# Patient Record
Sex: MALE | Race: WHITE | Employment: OTHER | URBAN - METROPOLITAN AREA
[De-identification: names, ages, dates, MRNs, and addresses within clinical notes are randomized per-mention and may not be internally consistent; named-entity substitution may affect disease eponyms.]

---

## 2021-06-25 ENCOUNTER — APPOINTMENT (OUTPATIENT)
Dept: GENERAL RADIOLOGY | Facility: HOSPITAL | Age: 71
End: 2021-06-25
Attending: EMERGENCY MEDICINE
Payer: MEDICARE

## 2021-06-25 ENCOUNTER — HOSPITAL ENCOUNTER (EMERGENCY)
Facility: HOSPITAL | Age: 71
Discharge: HOME | End: 2021-06-25
Attending: EMERGENCY MEDICINE
Payer: MEDICARE

## 2021-06-25 VITALS
DIASTOLIC BLOOD PRESSURE: 69 MMHG | HEART RATE: 51 BPM | RESPIRATION RATE: 16 BRPM | WEIGHT: 180 LBS | TEMPERATURE: 98 F | HEIGHT: 73 IN | OXYGEN SATURATION: 99 % | SYSTOLIC BLOOD PRESSURE: 157 MMHG | BODY MASS INDEX: 23.86 KG/M2

## 2021-06-25 DIAGNOSIS — L97.411 DIABETIC ULCER OF RIGHT MIDFOOT ASSOCIATED WITH TYPE 2 DIABETES MELLITUS, LIMITED TO BREAKDOWN OF SKIN (HCC): Primary | ICD-10-CM

## 2021-06-25 DIAGNOSIS — L03.115 CELLULITIS OF RIGHT LOWER EXTREMITY: ICD-10-CM

## 2021-06-25 DIAGNOSIS — E11.621 DIABETIC ULCER OF RIGHT MIDFOOT ASSOCIATED WITH TYPE 2 DIABETES MELLITUS, LIMITED TO BREAKDOWN OF SKIN (HCC): Primary | ICD-10-CM

## 2021-06-25 PROCEDURE — 73630 X-RAY EXAM OF FOOT: CPT | Performed by: EMERGENCY MEDICINE

## 2021-06-25 PROCEDURE — 80053 COMPREHEN METABOLIC PANEL: CPT | Performed by: EMERGENCY MEDICINE

## 2021-06-25 PROCEDURE — 99284 EMERGENCY DEPT VISIT MOD MDM: CPT

## 2021-06-25 PROCEDURE — 85025 COMPLETE CBC W/AUTO DIFF WBC: CPT | Performed by: EMERGENCY MEDICINE

## 2021-06-25 PROCEDURE — 96365 THER/PROPH/DIAG IV INF INIT: CPT

## 2021-06-25 PROCEDURE — S0077 INJECTION, CLINDAMYCIN PHOSP: HCPCS | Performed by: EMERGENCY MEDICINE

## 2021-06-25 PROCEDURE — 96366 THER/PROPH/DIAG IV INF ADDON: CPT

## 2021-06-25 RX ORDER — METOPROLOL SUCCINATE 50 MG/1
50 TABLET, EXTENDED RELEASE ORAL 2 TIMES DAILY
COMMUNITY
End: 2021-10-06

## 2021-06-25 RX ORDER — CLOPIDOGREL BISULFATE 75 MG/1
75 TABLET ORAL DAILY
COMMUNITY
End: 2021-10-27

## 2021-06-25 RX ORDER — INSULIN DEGLUDEC INJECTION 100 U/ML
20 INJECTION, SOLUTION SUBCUTANEOUS DAILY
COMMUNITY
End: 2021-10-06

## 2021-06-25 RX ORDER — ACETAMINOPHEN 500 MG
TABLET ORAL
Status: COMPLETED
Start: 2021-06-25 | End: 2021-06-25

## 2021-06-25 RX ORDER — LISINOPRIL 20 MG/1
20 TABLET ORAL DAILY
COMMUNITY
End: 2021-10-27

## 2021-06-25 RX ORDER — SERTRALINE HYDROCHLORIDE 100 MG/1
100 TABLET, FILM COATED ORAL DAILY
COMMUNITY
End: 2021-10-27

## 2021-06-25 RX ORDER — CLINDAMYCIN HYDROCHLORIDE 300 MG/1
300 CAPSULE ORAL 3 TIMES DAILY
Qty: 30 CAPSULE | Refills: 0 | Status: SHIPPED | OUTPATIENT
Start: 2021-06-25 | End: 2021-07-05

## 2021-06-25 RX ORDER — CLINDAMYCIN PHOSPHATE 600 MG/50ML
600 INJECTION INTRAVENOUS ONCE
Status: COMPLETED | OUTPATIENT
Start: 2021-06-25 | End: 2021-06-25

## 2021-06-25 RX ORDER — HYDROCHLOROTHIAZIDE 25 MG/1
25 TABLET ORAL DAILY
COMMUNITY
End: 2021-10-06

## 2021-06-25 RX ORDER — SODIUM CHLORIDE 9 MG/ML
1000 INJECTION, SOLUTION INTRAVENOUS ONCE
Status: COMPLETED | OUTPATIENT
Start: 2021-06-25 | End: 2021-06-25

## 2021-06-25 RX ORDER — ACETAMINOPHEN 500 MG
1000 TABLET ORAL ONCE
Status: COMPLETED | OUTPATIENT
Start: 2021-06-25 | End: 2021-06-25

## 2021-06-25 RX ORDER — GABAPENTIN 300 MG/1
300 CAPSULE ORAL NIGHTLY
COMMUNITY
End: 2021-10-27

## 2021-06-25 RX ORDER — ROSUVASTATIN CALCIUM 40 MG/1
40 TABLET, COATED ORAL NIGHTLY
COMMUNITY
End: 2021-10-27

## 2021-06-25 RX ORDER — ASPIRIN 81 MG/1
81 TABLET, CHEWABLE ORAL DAILY
COMMUNITY
End: 2021-12-28

## 2021-06-26 NOTE — ED PROVIDER NOTES
Patient Seen in: [facility] Emergency Department      History   Patient presents with:  Swelling Edema    Stated Complaint: pt c/o swollen feet and ulcers on the side x1 week     HPI/Subjective:   HPI    The patient is a 52-year-old diabetic male pr Exam     ED Triage Vitals [06/25/21 1920]   BP (!) 162/69   Pulse 55   Resp 18   Temp 98 °F (36.7 °C)   Temp src Oral   SpO2 98 %   O2 Device None (Room air)       Current:/69   Pulse 51   Temp 98 °F (36.7 °C) (Oral)   Resp 16   Ht 185.4 cm (6' 1\") tenderness. Distal sensations intact. DP pulses are faintly palpable.   Psych: Normal interaction, cooperative with exam       ED Course     Labs Reviewed   COMP METABOLIC PANEL (14) - Abnormal; Notable for the following components:       Result Value FINDINGS:  There is scattered mild arthritis, most pronounced in the     midfoot. There is pes planus. There is a small plantar heel spur. No     acute fracture, expansile lesion or erosion.   No gas or radiopaque foreign     body is seen in the soft over the dorsum of the foot. Correlation for cellulitis recommended.                      Dictated by (CST): Carrie Merchant MD on 6/25/2021 at 8:58 PM         Finalized by (CST): Carrie Merchant MD on 6/25/2021 at 9:01 PM              Patient's labs are reass

## 2021-09-28 ENCOUNTER — HOSPITAL ENCOUNTER (INPATIENT)
Facility: HOSPITAL | Age: 71
LOS: 8 days | Discharge: HOME | DRG: 253 | End: 2021-10-06
Attending: EMERGENCY MEDICINE | Admitting: HOSPITALIST
Payer: MEDICARE

## 2021-09-28 ENCOUNTER — APPOINTMENT (OUTPATIENT)
Dept: ULTRASOUND IMAGING | Facility: HOSPITAL | Age: 71
DRG: 253 | End: 2021-09-28
Attending: INTERNAL MEDICINE
Payer: MEDICARE

## 2021-09-28 ENCOUNTER — APPOINTMENT (OUTPATIENT)
Dept: MRI IMAGING | Facility: HOSPITAL | Age: 71
DRG: 253 | End: 2021-09-28
Attending: STUDENT IN AN ORGANIZED HEALTH CARE EDUCATION/TRAINING PROGRAM
Payer: MEDICARE

## 2021-09-28 ENCOUNTER — APPOINTMENT (OUTPATIENT)
Dept: GENERAL RADIOLOGY | Facility: HOSPITAL | Age: 71
DRG: 253 | End: 2021-09-28
Attending: EMERGENCY MEDICINE
Payer: MEDICARE

## 2021-09-28 DIAGNOSIS — I73.9 PAD (PERIPHERAL ARTERY DISEASE) (HCC): ICD-10-CM

## 2021-09-28 DIAGNOSIS — R73.9 HYPERGLYCEMIA: ICD-10-CM

## 2021-09-28 DIAGNOSIS — E11.65 TYPE 2 DIABETES MELLITUS WITH HYPERGLYCEMIA, WITH LONG-TERM CURRENT USE OF INSULIN (HCC): ICD-10-CM

## 2021-09-28 DIAGNOSIS — L03.116 CELLULITIS OF LEFT LOWER EXTREMITY: ICD-10-CM

## 2021-09-28 DIAGNOSIS — Z79.4 TYPE 2 DIABETES MELLITUS WITH HYPERGLYCEMIA, WITH LONG-TERM CURRENT USE OF INSULIN (HCC): ICD-10-CM

## 2021-09-28 DIAGNOSIS — L97.528 DIABETIC ULCER OF TOE OF LEFT FOOT ASSOCIATED WITH DIABETES MELLITUS OF OTHER TYPE, WITH OTHER ULCER SEVERITY (HCC): Primary | ICD-10-CM

## 2021-09-28 DIAGNOSIS — E13.621 DIABETIC ULCER OF TOE OF LEFT FOOT ASSOCIATED WITH DIABETES MELLITUS OF OTHER TYPE, WITH OTHER ULCER SEVERITY (HCC): Primary | ICD-10-CM

## 2021-09-28 DIAGNOSIS — M86.9 OSTEOMYELITIS OF SECOND TOE OF LEFT FOOT (HCC): ICD-10-CM

## 2021-09-28 PROBLEM — E11.621 DIABETIC FOOT ULCER (HCC): Status: ACTIVE | Noted: 2021-09-28

## 2021-09-28 PROBLEM — L97.509 DIABETIC FOOT ULCER (HCC): Status: ACTIVE | Noted: 2021-09-28

## 2021-09-28 PROCEDURE — 73630 X-RAY EXAM OF FOOT: CPT | Performed by: EMERGENCY MEDICINE

## 2021-09-28 PROCEDURE — 3046F HEMOGLOBIN A1C LEVEL >9.0%: CPT | Performed by: NURSE PRACTITIONER

## 2021-09-28 PROCEDURE — 99223 1ST HOSP IP/OBS HIGH 75: CPT | Performed by: INTERNAL MEDICINE

## 2021-09-28 PROCEDURE — 99223 1ST HOSP IP/OBS HIGH 75: CPT | Performed by: STUDENT IN AN ORGANIZED HEALTH CARE EDUCATION/TRAINING PROGRAM

## 2021-09-28 PROCEDURE — 73718 MRI LOWER EXTREMITY W/O DYE: CPT | Performed by: STUDENT IN AN ORGANIZED HEALTH CARE EDUCATION/TRAINING PROGRAM

## 2021-09-28 PROCEDURE — 93971 EXTREMITY STUDY: CPT | Performed by: INTERNAL MEDICINE

## 2021-09-28 RX ORDER — SODIUM CHLORIDE 9 MG/ML
INJECTION, SOLUTION INTRAVENOUS CONTINUOUS
Status: ACTIVE | OUTPATIENT
Start: 2021-09-28 | End: 2021-09-28

## 2021-09-28 RX ORDER — INSULIN ASPART 100 [IU]/ML
0.2 INJECTION, SOLUTION INTRAVENOUS; SUBCUTANEOUS ONCE
Status: COMPLETED | OUTPATIENT
Start: 2021-09-28 | End: 2021-09-28

## 2021-09-28 RX ORDER — ASPIRIN 81 MG/1
81 TABLET, CHEWABLE ORAL DAILY
Status: DISCONTINUED | OUTPATIENT
Start: 2021-09-28 | End: 2021-10-06

## 2021-09-28 RX ORDER — METOCLOPRAMIDE HYDROCHLORIDE 5 MG/ML
5 INJECTION INTRAMUSCULAR; INTRAVENOUS EVERY 8 HOURS PRN
Status: DISCONTINUED | OUTPATIENT
Start: 2021-09-28 | End: 2021-10-06

## 2021-09-28 RX ORDER — LISINOPRIL 20 MG/1
20 TABLET ORAL 2 TIMES DAILY
Status: DISCONTINUED | OUTPATIENT
Start: 2021-09-28 | End: 2021-10-06

## 2021-09-28 RX ORDER — POTASSIUM CHLORIDE 20 MEQ/1
40 TABLET, EXTENDED RELEASE ORAL EVERY 4 HOURS
Status: COMPLETED | OUTPATIENT
Start: 2021-09-28 | End: 2021-09-28

## 2021-09-28 RX ORDER — ROSUVASTATIN CALCIUM 20 MG/1
40 TABLET, COATED ORAL NIGHTLY
Status: DISCONTINUED | OUTPATIENT
Start: 2021-09-28 | End: 2021-10-06

## 2021-09-28 RX ORDER — SODIUM CHLORIDE, SODIUM LACTATE, POTASSIUM CHLORIDE, CALCIUM CHLORIDE 600; 310; 30; 20 MG/100ML; MG/100ML; MG/100ML; MG/100ML
INJECTION, SOLUTION INTRAVENOUS CONTINUOUS
Status: DISCONTINUED | OUTPATIENT
Start: 2021-09-28 | End: 2021-10-06

## 2021-09-28 RX ORDER — HEPARIN SODIUM 5000 [USP'U]/ML
5000 INJECTION, SOLUTION INTRAVENOUS; SUBCUTANEOUS EVERY 8 HOURS SCHEDULED
Status: DISCONTINUED | OUTPATIENT
Start: 2021-09-28 | End: 2021-10-06

## 2021-09-28 RX ORDER — ACETAMINOPHEN 325 MG/1
650 TABLET ORAL EVERY 6 HOURS PRN
Status: DISCONTINUED | OUTPATIENT
Start: 2021-09-28 | End: 2021-10-06

## 2021-09-28 RX ORDER — ONDANSETRON 2 MG/ML
4 INJECTION INTRAMUSCULAR; INTRAVENOUS EVERY 6 HOURS PRN
Status: DISCONTINUED | OUTPATIENT
Start: 2021-09-28 | End: 2021-10-06

## 2021-09-28 RX ORDER — EZETIMIBE 10 MG/1
10 TABLET ORAL NIGHTLY
Status: ON HOLD | COMMUNITY
End: 2021-09-28

## 2021-09-28 RX ORDER — SERTRALINE HYDROCHLORIDE 100 MG/1
100 TABLET, FILM COATED ORAL DAILY
Status: DISCONTINUED | OUTPATIENT
Start: 2021-09-28 | End: 2021-10-06

## 2021-09-28 RX ORDER — DEXTROSE MONOHYDRATE 25 G/50ML
50 INJECTION, SOLUTION INTRAVENOUS
Status: DISCONTINUED | OUTPATIENT
Start: 2021-09-28 | End: 2021-10-06

## 2021-09-28 RX ORDER — METOPROLOL SUCCINATE 50 MG/1
50 TABLET, EXTENDED RELEASE ORAL
Status: DISCONTINUED | OUTPATIENT
Start: 2021-09-28 | End: 2021-10-06

## 2021-09-28 RX ORDER — GABAPENTIN 300 MG/1
300 CAPSULE ORAL 3 TIMES DAILY
Status: DISCONTINUED | OUTPATIENT
Start: 2021-09-28 | End: 2021-10-06

## 2021-09-28 NOTE — PLAN OF CARE
Pt A&ox4. Sats >90% on RA. NSR on tele. Potassium replaced waiting for redraw. SCD's. subcutaneous heparin. Pt voids per the bathroom. Pt is able to ambulate 1x SB. 1800 ADA with a QID auccheck, sugars being controlled with insulin.  IV ABX and LR @100 ml/h

## 2021-09-28 NOTE — H&P
[facility] HOSPITALIST  History and Physical     Maricruz Scarlett Patient Status:  Emergency    1950 MRN [id number]   Location [address] [facility] Attending Michelle Massey, [address] [facility] Day # 0 PCP Sherie Habermann, MD     Chief Com Take 300 mg by mouth 3 (three) times daily. , Disp: , Rfl:   hydrochlorothiazide 25 MG Oral Tab, Take 25 mg by mouth daily. , Disp: , Rfl:   metFORMIN HCl 500 MG Oral Tab, Take 500 mg by mouth 2 (two) times daily with meals. , Disp: , Rfl:   Insulin Degludec Not Detected 09/28/2021       Pro-Calcitonin  No results for input(s): PCT in the last 168 hours. Cardiac  No results for input(s): TROP, PBNP in the last 168 hours. Creatinine Kinase  No results for input(s): CK in the last 168 hours.     Inflammator

## 2021-09-28 NOTE — CONSULTS
[facility]    Report of Consultation    Curly Allen Patient Status:  Inpatient    1950 MRN [id number]   [facility] 3SW-A Attending Kiki Mccoy, DO   Hosp Day # 0 PCP Javier Mueller MD     Date of Admission 8 tablet, 8 tablet, Oral, Q15 Min PRN  •  lactated ringers infusion, , Intravenous, Continuous  •  Heparin Sodium (Porcine) 5000 UNIT/ML injection 5,000 Units, 5,000 Units, Subcutaneous, Q8H [facility]  •  acetaminophen (TYLENOL) tab 650 mg, 650 mg, Oral, Q6H PRN (TEK=07342)    Result Date: 9/28/2021  CONCLUSION:  Eroded appearance of the majority of the distal phalanx of the 2nd toe. Soft tissues are swollen. Given history of ulcer as well as the bone erosion, concerning for osteomyelitis.   If any further imagin vascular consult pending results of arterial duplex.  -Wound cultures pending.  -Receiving Zosyn.  -Post op shoes ordered for both feet.  He is okay to heel weight bear to left foot with surgical shoe at this time.  -Will continue to follow.  -Please reach

## 2021-09-28 NOTE — ED QUICK NOTES
Spoke w/.  Ricarda [phone] , pt daughter given update, pt family allowed to know medical information

## 2021-09-28 NOTE — PROGRESS NOTES
09/28/21 1402   Clinical Encounter Type   Visited With Patient   Continue Visiting No   Patient Spiritual Encounters   Spiritual Interventions  completed a HCPOA with patient, making copies for him and , and putting a copy in his pa

## 2021-09-28 NOTE — PROGRESS NOTES
NURSING ADMISSION NOTE      Patient admitted via Cart  Oriented to room. Safety precautions initiated. Bed in low position. Call light in reach. Patient admitted in stable condition. Admission navigator completed with patient.  Patient unsure if h

## 2021-09-28 NOTE — DIETARY NOTE
Annetta     Admitting diagnosis:  Hyperglycemia [R73.9]  Cellulitis of left lower extremity [J33.517]  Diabetic ulcer of toe of left foot associated with diabetes mellitus of other type, with other ulcer

## 2021-09-28 NOTE — ED PROVIDER NOTES
Patient Seen in: [facility] Emergency Department      History   Patient presents with:  Cellulitis    Stated Complaint: foot ulcer x one week    Subjective:   HPI    78-year-old male with history of diabetes mellitus, hypertension, coronary disease, peripheral edema, no calf tenderness, dorsal pedal pulses present and equal bilaterally. Left foot–there is ulcer to the distal aspect of the second toe with diffuse swelling and erythema the digit, erythema and warmth extends to the dorsum of the foot. foot 4 days  Other Notes (entered by Technologist):      Additional Information (per Vision Radiologist):      XR left foot      IMPRESSION:  -The mid and distal portions of the second distal phalanx are poorly visualized and could be eroded secondary to ch

## 2021-09-29 PROCEDURE — 99232 SBSQ HOSP IP/OBS MODERATE 35: CPT | Performed by: HOSPITALIST

## 2021-09-29 PROCEDURE — 99232 SBSQ HOSP IP/OBS MODERATE 35: CPT | Performed by: STUDENT IN AN ORGANIZED HEALTH CARE EDUCATION/TRAINING PROGRAM

## 2021-09-29 RX ORDER — VANCOMYCIN HYDROCHLORIDE
15 EVERY 24 HOURS
Status: DISCONTINUED | OUTPATIENT
Start: 2021-09-30 | End: 2021-09-30

## 2021-09-29 RX ORDER — VANCOMYCIN 2 GRAM/500 ML IN 0.9 % SODIUM CHLORIDE INTRAVENOUS
25 ONCE
Status: COMPLETED | OUTPATIENT
Start: 2021-09-29 | End: 2021-09-29

## 2021-09-29 NOTE — PROGRESS NOTES
[facility] Dosing Service    Initial Pharmacokinetic Consult for Vancomycin Dosing     Sunday Carlos is a 80year old patient who is being treated for diabetic foot and osteomyelitis.      Weights:  Ideal body weight: 75.3 kg (166 lb 0.1 oz)  Adjus

## 2021-09-29 NOTE — PROGRESS NOTES
[facility]     Hospitalist Progress Note     Sue Ortega Patient Status:  Inpatient    1950 MRN [id number]   [facility] 3SW-A Attending [facility] Day # 1 PCP Natan Bryan MD     Chief Compl in the last 168 hours. Imaging: Imaging data reviewed in Epic.     Medications:   • vancomycin  25 mg/kg Intravenous Once   • [START ON 9/30/2021] vancomycin  15 mg/kg Intravenous Q24H   • Heparin Sodium (Porcine)  5,000 Units Subcutaneous Q8H [address]   • In home        **Certification      PHYSICIAN Certification of Need for Inpatient Hospitalization - Initial Certification    Patient will require inpatient services that will reasonably be expected to span two midnight's based on the clinical documentation in

## 2021-09-29 NOTE — PLAN OF CARE
Patient A&O X4 on RA. VSS, /telemetry- NSR. SCDs, SubQ heparin. Voiding freely, LBM 9/28. Wound to left foot second toe with dressing in place, c/d/i. Podiatry on consult. Denies pain. On IV antibioitics. Post-op shoes ordered. Min assist with walker.  R

## 2021-09-29 NOTE — PLAN OF CARE
Dressing to left foot clean, dry, intact. Denies pain. Iv antibiotics as ordered. Awaiting arterial doppler. Instructed to call for all asst needed, discomfort felt, call don't fall protocol. Verbalized understanding. Safety precautions maintained.

## 2021-09-29 NOTE — CONSULTS
[facility] CONSULT NOTE    Naoma Precise Patient Status:  Inpatient    1950 MRN [id number]   [facility] 3SW-A Attending [place] [id number] Day # 1 PCP Fawn Rodriguez MD       Reason for Cons Intravenous, Q6H PRN  •  metoclopramide (REGLAN) injection 5 mg, 5 mg, Intravenous, Q8H PRN  •  Insulin Aspart Pen (NOVOLOG) 100 UNIT/ML flexpen 2-10 Units, 2-10 Units, Subcutaneous, TID CC and HS  •  Insulin Aspart Pen (NOVOLOG) 100 UNIT/ML flexpen 1-68 U Movement of all extremities  Integument: Dressing over left foot- clean/dry/intact. Picture from Dr. Jace Paez note reviewed.      Laboratory Data:  Recent Labs   Lab 09/28/21  0515   RBC 3.53*   HGB 10.3*   HCT 31.3*   MCV 88.7   MCH 29.2   MCHC 32.9   RDW 1 Basia Leo MD on 9/28/2021 at [address] by (CST): Basia Leo MD on 9/28/2021 at 6:20 PM       PROCEDURE: XR FOOT, COMPLETE (MIN 3 VIEWS), LEFT (CPT=73630)     TECHNIQUE: AP, oblique, and lateral views were obtained.      COMPARISON: [facility] PATIENT STATED HISTORY: (As transcribed by Technologist) left lower extremity edema     FINDINGS: Compression is demonstrated of the common femoral, superficial femoral and popliteal venous segments. There is observed phasicity and augmentation.  Flow i

## 2021-09-30 ENCOUNTER — APPOINTMENT (OUTPATIENT)
Dept: ULTRASOUND IMAGING | Facility: HOSPITAL | Age: 71
DRG: 253 | End: 2021-09-30
Attending: STUDENT IN AN ORGANIZED HEALTH CARE EDUCATION/TRAINING PROGRAM
Payer: MEDICARE

## 2021-09-30 PROCEDURE — 99232 SBSQ HOSP IP/OBS MODERATE 35: CPT | Performed by: HOSPITALIST

## 2021-09-30 PROCEDURE — 99231 SBSQ HOSP IP/OBS SF/LOW 25: CPT | Performed by: STUDENT IN AN ORGANIZED HEALTH CARE EDUCATION/TRAINING PROGRAM

## 2021-09-30 PROCEDURE — 93925 LOWER EXTREMITY STUDY: CPT | Performed by: STUDENT IN AN ORGANIZED HEALTH CARE EDUCATION/TRAINING PROGRAM

## 2021-09-30 RX ORDER — CEFAZOLIN SODIUM/WATER 2 G/20 ML
2 SYRINGE (ML) INTRAVENOUS EVERY 8 HOURS
Status: DISCONTINUED | OUTPATIENT
Start: 2021-09-30 | End: 2021-10-06

## 2021-09-30 NOTE — PROGRESS NOTES
[facility]    Progress Note    Christiano Sutton Patient Status:  Inpatient    1950 MRN [id number]   [facility] 3SW-A Attending Reanna Cross, [address] [facility] Day # 1 PCP Alicia Spicer MD     Date of Admission:   (REGLAN) injection 5 mg, 5 mg, Intravenous, Q8H PRN  •  Insulin Aspart Pen (NOVOLOG) 100 UNIT/ML flexpen 2-10 Units, 2-10 Units, Subcutaneous, TID CC and HS  •  Insulin Aspart Pen (NOVOLOG) 100 UNIT/ML flexpen 1-68 Units, 1-68 Units, Subcutaneous, TID CC 9/28/2021  CONCLUSION:  There is osteomyelitis involving distal and middle phalanges of the 2nd toe.   The marrow signal in the proximal phalange is normal.  Marrow signal in remaining bony structures is normal.   Dictated by (CST): Manuela Richards MD on 9/28 Rajiv Leggett DPM

## 2021-09-30 NOTE — PLAN OF CARE
Patient A&O X4 on RA. VSS, /telemetry- NSR. SubQ heparin. Voiding freely, LBM 9/29. Wound to left foot second toe with dressing in place, c/d/i. Heel WB with post-op shoes. Denies pain. On IV antibioitics. Min assist with walker.  Reminded to use call li

## 2021-09-30 NOTE — PROGRESS NOTES
Lunch blood sugar 357. Order is to call if >350. 10 units given per sliding scale, plus 7 units for carb count as well. Page sent to Dr Marycarmen Stout. Spoke to Dr Marycarmen Stout. Per Dr Marycarmen Stout, will continue to monitor sugars.  Also monitor for extra snac

## 2021-09-30 NOTE — PROGRESS NOTES
[facility]     Hospitalist Progress Note     Kiran Roberts Patient Status:  Inpatient    1950 MRN [id number]   [facility] 3SW-A Attending [address]  [place] Day # 2 PCP Cecilia Cutler MD     Chief Compl Markers  No results for input(s): CRP, TANISHA, LDH, DDIMER in the last 168 hours. Imaging: Imaging data reviewed in Epic.     Medications:   • ceFAZolin  2 g Intravenous Q8H   • Heparin Sodium (Porcine)  5,000 Units Subcutaneous Q8H [facility]   • Insulin Aspart P this point Mr. Aster Nieto is expected to be discharge to: home

## 2021-09-30 NOTE — PLAN OF CARE
Pt a&O. On room air. Pt refusing Scds to BLE. Tolerating diet with excellent appetite. Blood sugars monitored and insulin given as ordered. Last BM 9/29/21. Voiding w/o difficulty. No c/o pain. Up independently using walker and gait belt.  Post op shoes on

## 2021-09-30 NOTE — PROGRESS NOTES
[facility]    Progress Note    Juan Mike Patient Status:  Inpatient    1950 MRN [id number]   [facility] 3SW-A Attending Ester Morales, [address] [facility] Day # 2 PCP Lemuel Walker MD     Date of Admission: flexpen 2-10 Units, 2-10 Units, Subcutaneous, TID CC and HS  •  Insulin Aspart Pen (NOVOLOG) 100 UNIT/ML flexpen 1-68 Units, 1-68 Units, Subcutaneous, TID CC  •  aspirin chewable tab 81 mg, 81 mg, Oral, Daily  •  gabapentin (NEURONTIN) cap 300 mg, 300 mg, (KWW=[id number])    Result Date: 9/30/2021  CONCLUSION:  1. Stenosis involves the posterior tibial arteries, left greater than right as well as the anterior tibial arteries with occlusion of the right distal anterior tibial artery. Please see details as above.

## 2021-09-30 NOTE — PROGRESS NOTES
[facility] ID PROGRESS NOTE    Kristal Shape Patient Status:  Inpatient    1950 MRN [id number]   [facility] 3SW-A Attending [facility] Day # 2 PCP Alisha Muro MD     Abx: IV rishio D# Systems:  Completed. See pertinent positives and negatives above. Physical Exam:  Vital signs: Blood pressure 152/47, pulse 52, temperature 98.6 °F (37 °C), temperature source Oral, resp.  rate 16, height 5' 11\" (1.803 m), weight 220 lb (99.8 kg), SpO2 needed. Exam and Impression/ Recs as noted above.   D/w staff and with pt  Will follow     Carlos Ho MD

## 2021-10-01 PROCEDURE — 99232 SBSQ HOSP IP/OBS MODERATE 35: CPT | Performed by: HOSPITALIST

## 2021-10-01 PROCEDURE — 99231 SBSQ HOSP IP/OBS SF/LOW 25: CPT | Performed by: STUDENT IN AN ORGANIZED HEALTH CARE EDUCATION/TRAINING PROGRAM

## 2021-10-01 NOTE — PROGRESS NOTES
Spoke to Dr Jacqueline Monroe from Vascular surgery. Per Dr Jacqueline Monroe, he will be taking over care of this pt and will be seeing him later. If not today, then he is here this weekend as well. If angiogram indicated, it will be scheduled on Monday per Dr Jacqueline Monroe.  Pt

## 2021-10-01 NOTE — PLAN OF CARE
Patient A&O X4 on RA. VSS, . SubQ heparin. Voiding freely, LBM 9/30. Wound to left foot second toe with dressing in place, c/d/i. Heel WB with post-op shoes. Denies pain. On IV Ancef. Min assist with walker.  BLE arterial dopplers completed this morning-

## 2021-10-01 NOTE — PROGRESS NOTES
[facility] ID PROGRESS NOTE    Ramón Phan Patient Status:  Inpatient    1950 MRN [id number]   [facility] 3SW-A Attending Ilda [facility] Day # 3 PCP Aravind Lugo MD     Abx: IV Ancef D# Subcutaneous, Daily    Review of Systems:  Completed. See pertinent positives and negatives above. Physical Exam:  Vital signs: Blood pressure 118/57, pulse 53, temperature 97.7 °F (36.5 °C), temperature source Oral, resp.  rate 19, height 5' 11\" (1.803 examined independently. Chart reviewed. Agree with above. Note has been reviewed by me and modified as needed. Exam and Impression/ Recs as noted above.   D/w staff and with pt  Will follow     Damaso Dotson MD

## 2021-10-01 NOTE — PLAN OF CARE
Pt a&O. On room air. Pt refusing Scds to BLE. Tolerating diet with excellent appetite. Blood sugars monitored and insulin given as ordered. Had several BMs today. Voiding w/o difficulty. No c/o pain. Up independently using walker and gait belt.  Post op caitie

## 2021-10-01 NOTE — PROGRESS NOTES
[facility]    Progress Note    Chinedu Vang Patient Status:  Inpatient    1950 MRN [id number]   [facility] 3SW-A Attending Eliazar Bo, [address] [facility] Day # 3 PCP Micaela Hamman, MD     Date of Admission: Units, Subcutaneous, TID CC and HS  •  Insulin Aspart Pen (NOVOLOG) 100 UNIT/ML flexpen 1-68 Units, 1-68 Units, Subcutaneous, TID CC  •  aspirin chewable tab 81 mg, 81 mg, Oral, Daily  •  gabapentin (NEURONTIN) cap 300 mg, 300 mg, Oral, TID  •  lisinopril Please see details as above. 2. Small vessel disease involves the feet bilaterally.    Dictated by (CST): Norma Allen MD on 9/30/2021 at 11:48 AM     Finalized by (MARTY): Norma Allen MD on 9/30/2021 at 11:57 AM          Laboratory Data:  Recent Labs   Lab 09/2

## 2021-10-01 NOTE — PROGRESS NOTES
[facility]     Hospitalist Progress Note     Rocíobeckie Soares Patient Status:  Inpatient    1950 MRN [id number]   [facility] 3SW-A Attending Marlene Gloria [facility] Day # 3 PCP Rosaura Pena MD     Chief Compl last 168 hours. Cardiac  No results for input(s): TROP, PBNP in the last 168 hours. Creatinine Kinase  No results for input(s): CK in the last 168 hours. Inflammatory Markers  No results for input(s): CRP, TANSIHA, LDH, DDIMER in the last 168 hours. Quality:  · DVT Prophylaxis: heparin  · CODE status: Full  · Gonsales: N/A  · Central line: N/A  · If COVID testing is negative, may discontinue isolation: yes     Will the patient be referred to TCC on discharge?: no  Estimated date of discharge: haydee Palmer

## 2021-10-02 PROCEDURE — 99232 SBSQ HOSP IP/OBS MODERATE 35: CPT | Performed by: HOSPITALIST

## 2021-10-02 NOTE — PROGRESS NOTES
[facility] PROGRESS NOTE    Fern Killer Patient Status:  Inpatient    1950 MRN [id number]   [facility] 3SW-A Attending [place]  [place] Day # 4 PCP Paulette Cesar MD     Abx: IV Ancef D# Systems:  Completed. See pertinent positives and negatives above. Physical Exam:  Vital signs: Blood pressure 113/49, pulse 58, temperature 98.2 °F (36.8 °C), temperature source Oral, resp.  rate 16, height 5' 11\" (1.803 m), weight 220 lb (99.8 kg), SpO vascular following, plans for angiogram, likely Monday    Discussed case with RN and patient. Discussed with patient's daughter over the phone. All questions answered.     Carmen Garcia PA-C

## 2021-10-02 NOTE — PROGRESS NOTES
[facility]    Progress Note    Kiran Roberts Patient Status:  Inpatient    1950 MRN [id number]   [facility] 3SW-A Attending Osei Burch, 160[address] [facility] Day # 4 PCP Cecilia Cutler MD     Date of Admission: metoclopramide (REGLAN) injection 5 mg, 5 mg, Intravenous, Q8H PRN  •  Insulin Aspart Pen (NOVOLOG) 100 UNIT/ML flexpen 2-10 Units, 2-10 Units, Subcutaneous, TID CC and HS  •  Insulin Aspart Pen (NOVOLOG) 100 UNIT/ML flexpen 1-68 Units, 1-68 Units, Subcut confirm osteomyelitis to distal and middle phalanges of left 2nd digit. -Reviewed x-ray findings with patient--suggestive of osteomyelitis to left 2nd digit.   -Reviewed arterial duplex results--stenosis present to left PTA.   -Vascular surgery planning fo

## 2021-10-02 NOTE — PLAN OF CARE
Dressing to left lower extremity clean, dry, intact. Wearing bilateral post op shoes. Daughter at bedside. Instructed on plan of care, call don't fall protocol, call for all asst needed, discomfort felt. Verbalized understanding.   Plan possible angiogr

## 2021-10-02 NOTE — PLAN OF CARE
A/O VSS on room air. Left foot dressing with gauze and kerlix c/d/I. Post op shoe on when up. Heel WB to left foot. Denies pain. Up with walker and SB assist. Vascular consult to see. Plan for angiogram on Monday. Plan of care reviewed.  Call light within r

## 2021-10-02 NOTE — PLAN OF CARE
Pt a&O. On room air. Pt refusing Scds to BLE. Tolerating diet with excellent appetite. Blood sugars monitored and insulin given as ordered. Last BM 10/1/21. Voiding w/o difficulty. No c/o pain. Up independently using walker and gait belt.  Post op shoes on

## 2021-10-02 NOTE — CONSULTS
Vascular Surgery  New Patient Consultation    Name: Norma Huertas  MRN: [id number]  : 1950    Referring Provider: No ref.  provider found    CC: Left second toe gangrene    HPI: 67-year-old male with hx of hypertension, hyperlipidemia, diabe Rfl:   metFORMIN HCl 500 MG Oral Tab, Take 500 mg by mouth 2 (two) times daily with meals. , Disp: , Rfl:   Insulin Degludec (TRESIBA) 100 UNIT/ML Subcutaneous Solution, Inject 20 Units into the skin daily. , Disp: , Rfl:         Social History    Socioecono drainage. No erythema, induration, fluctuance, drainage. No evidence of cellulitis.     Labs:  Lab Results   Component Value Date    WBC 8.8 09/28/2021    HGB 10.3 (L) 09/28/2021    HCT 31.3 (L) 09/28/2021    .0 09/28/2021    NEPERCENT 71.3 09/28/2 TIBIOPER TRUNK:      Not visualized   LEFT PTA PROX:      530--greater than 75% stenosis   LEFT PTA MID:      106   LEFT PTA DISTAL:      103   LEFT PARISA PROX:      218   LEFT PARISA MID:      105     LEFT PARISA DISTAL:      45   LEFT GREAT TOE PRESSURE:

## 2021-10-02 NOTE — PROGRESS NOTES
[facility]     Hospitalist Progress Note     Sue Ortega Patient Status:  Inpatient    1950 MRN [id number]   [facility] 3SW-A Attending [facility] Day # 4 PCP Natan Bryan MD     Chief Compl Results    COVID-19  Lab Results   Component Value Date    COVID19 Not Detected 09/28/2021       Pro-Calcitonin  No results for input(s): PCT in the last 168 hours. Cardiac  No results for input(s): TROP, PBNP in the last 168 hours.     Creatinine Kinase 6. Essential HTN  1. Hold thiazide   7. Dyslipidemia  8. Depression - SSRI  9.  Peripheral Neuropathy - Gabapentin       Plan of care: As above    Plan of care discussed with pt at bedside    Darrold Blizzard, DO    Supplementary Documentation:

## 2021-10-03 PROCEDURE — 99232 SBSQ HOSP IP/OBS MODERATE 35: CPT | Performed by: HOSPITALIST

## 2021-10-03 NOTE — PROGRESS NOTES
[facility]     Hospitalist Progress Note     Ralphvenkatesh Canohua Patient Status:  Inpatient    1950 MRN [id number]   [facility] 3SW-A Attending Miky [facility] Day # 5 PCP Javier Mueller MD     Chief Compl Results    COVID-19  Lab Results   Component Value Date    COVID19 Not Detected 09/28/2021       Pro-Calcitonin  No results for input(s): PCT in the last 168 hours. Cardiac  No results for input(s): TROP, PBNP in the last 168 hours.     Creatinine Kinase 6. Essential HTN  1. Hold thiazide   7. Dyslipidemia  8. Depression - SSRI  9.  Peripheral Neuropathy - Gabapentin       Plan of care: As above    Plan of care discussed with pt at bedside    Nicolasa Cid DO    Supplementary Documentation:

## 2021-10-03 NOTE — PLAN OF CARE
A/O VSS on room air. Denies pain. Refusing scd's to BLE. Left foot dressing c/d/I. Post op shoes to BLE. Heel WB to left foot. Up with walker. Plan of care reviewed. Call light within reach.

## 2021-10-03 NOTE — PLAN OF CARE
Pt a&O. On room air. Pt refusing Scds to BLE. Tolerating diet with excellent appetite. Blood sugars monitored and insulin given as ordered. Had BM today. Voiding w/o difficulty. No c/o pain. Up independently using walker and gait belt.  Post op shoes on BLE

## 2021-10-04 ENCOUNTER — APPOINTMENT (OUTPATIENT)
Dept: INTERVENTIONAL RADIOLOGY/VASCULAR | Facility: HOSPITAL | Age: 71
DRG: 253 | End: 2021-10-04
Attending: SURGERY
Payer: MEDICARE

## 2021-10-04 PROCEDURE — 99231 SBSQ HOSP IP/OBS SF/LOW 25: CPT | Performed by: STUDENT IN AN ORGANIZED HEALTH CARE EDUCATION/TRAINING PROGRAM

## 2021-10-04 PROCEDURE — 047U3ZZ DILATION OF LEFT PERONEAL ARTERY, PERCUTANEOUS APPROACH: ICD-10-PCS | Performed by: SURGERY

## 2021-10-04 PROCEDURE — 99232 SBSQ HOSP IP/OBS MODERATE 35: CPT | Performed by: INTERNAL MEDICINE

## 2021-10-04 PROCEDURE — 047N3Z1 DILATION OF LEFT POPLITEAL ARTERY USING DRUG-COATED BALLOON, PERCUTANEOUS APPROACH: ICD-10-PCS | Performed by: SURGERY

## 2021-10-04 PROCEDURE — B41CYZZ FLUOROSCOPY OF PELVIC ARTERIES USING OTHER CONTRAST: ICD-10-PCS | Performed by: SURGERY

## 2021-10-04 PROCEDURE — 047S3ZZ DILATION OF LEFT POSTERIOR TIBIAL ARTERY, PERCUTANEOUS APPROACH: ICD-10-PCS | Performed by: SURGERY

## 2021-10-04 PROCEDURE — B41DYZZ FLUOROSCOPY OF AORTA AND BILATERAL LOWER EXTREMITY ARTERIES USING OTHER CONTRAST: ICD-10-PCS | Performed by: SURGERY

## 2021-10-04 RX ORDER — SODIUM CHLORIDE 9 MG/ML
INJECTION, SOLUTION INTRAVENOUS
Status: DISCONTINUED | OUTPATIENT
Start: 2021-10-05 | End: 2021-10-04 | Stop reason: HOSPADM

## 2021-10-04 RX ORDER — NITROGLYCERIN 20 MG/100ML
INJECTION INTRAVENOUS
Status: COMPLETED
Start: 2021-10-04 | End: 2021-10-04

## 2021-10-04 RX ORDER — CLOPIDOGREL BISULFATE 75 MG/1
75 TABLET ORAL DAILY
Status: DISCONTINUED | OUTPATIENT
Start: 2021-10-05 | End: 2021-10-06

## 2021-10-04 RX ORDER — CLOPIDOGREL BISULFATE 75 MG/1
TABLET ORAL
Status: COMPLETED
Start: 2021-10-04 | End: 2021-10-04

## 2021-10-04 RX ORDER — MIDAZOLAM HYDROCHLORIDE 1 MG/ML
INJECTION INTRAMUSCULAR; INTRAVENOUS
Status: COMPLETED
Start: 2021-10-04 | End: 2021-10-04

## 2021-10-04 RX ORDER — LIDOCAINE HYDROCHLORIDE 10 MG/ML
INJECTION, SOLUTION EPIDURAL; INFILTRATION; INTRACAUDAL; PERINEURAL
Status: COMPLETED
Start: 2021-10-04 | End: 2021-10-04

## 2021-10-04 RX ORDER — HEPARIN SODIUM 5000 [USP'U]/ML
INJECTION, SOLUTION INTRAVENOUS; SUBCUTANEOUS
Status: COMPLETED
Start: 2021-10-04 | End: 2021-10-04

## 2021-10-04 RX ORDER — CLOPIDOGREL BISULFATE 75 MG/1
300 TABLET ORAL ONCE
Status: DISCONTINUED | OUTPATIENT
Start: 2021-10-04 | End: 2021-10-06

## 2021-10-04 NOTE — PROGRESS NOTES
[facility] PROGRESS NOTE    Donovan Grover Patient Status:  Inpatient    1950 MRN [id number]   [facility] 3SW-A Attending [facility] Day # 6 PCP Lan Cash MD     Abx: IV Ancef D# Systems:  Completed. See pertinent positives and negatives above. Physical Exam:  Vital signs: Blood pressure 137/52, pulse 52, temperature 97.7 °F (36.5 °C), temperature source Oral, resp.  rate 19, height 5' 11\" (1.803 m), weight 220 lb (99.8 kg), SpO today    Discussed case with RN and patient. Jennifer Prieto PA-C    ID ATTENDING ADDENDUM     Pt seen an examined independently. Chart reviewed. Agree with above. Note has been reviewed by me and modified as needed.   Exam and Impression/ Recs as no

## 2021-10-04 NOTE — PROGRESS NOTES
Pt s/p PTA with Dr. Glenn Demarco. Pt recovered in holding until 7605 cleaned. Pt flat. Right groin dressing CDI, no bleeding or hematoma. Left pedal with doppler. Right pedal +1. Pt drank orange juice.  Pt urinated in urinal. Pt transferred to room 7605 when ro

## 2021-10-04 NOTE — PLAN OF CARE
Patient alert and oriented x 4 , up in chair and wants to sleep in the chair , states bed is not comfortable . Denies any pain , vital signs stable . Up walking with steady gait ,post op shoe to both feet ,dressing to left toe clean and dry .  On iv antibio

## 2021-10-04 NOTE — PLAN OF CARE
Aox4, pre-cath order set ordered, CHG bath and hair clipped, placed on tele, 12 lead EKG completed, 7 units of Levimir given per order set & Dr. Miguel Hook, Lisinopril held per order set, angiogram scheduled for 1300 today, dressing to left foot C/D/I, normal sa

## 2021-10-04 NOTE — PROGRESS NOTES
[facility]     Hospitalist Progress Note      Carlos Patient Status:  Inpatient    1950 MRN [id number]   [facility] 3SW-A Attending [facility], [address] [facility] Day # 6 PCP Chandan Tovar MD     Chief Compla Results   Component Value Date    COVID19 Not Detected 09/28/2021       Pro-Calcitonin  No results for input(s): PCT in the last 168 hours. Cardiac  No results for input(s): TROP, PBNP in the last 168 hours.     Creatinine Kinase  No results for input(s) midnights  Discharge is dependent on: clinical state, consultant recs  At this point Mr. Bonita Parmar is expected to be discharge to: home

## 2021-10-04 NOTE — OPERATIVE REPORT
Vascular Surgery Op Note  Patients Name:    Stacey Lung    Operating Physician: Hilda Wray MD  CSN:    [id number]                                                           Location:  OR  MRN:     [id number] underwent noninvasive vascular lab studies that revealed inadequate flow for wound healing and as such I recommend him for a left leg angiogram with the possibility for intervention. I discussed the risk benefits of the procedure.   Informed consent was [facility] traverse the entire to the occlusion and confirmed this via contrast angiography. Angioplasty was performed of the anterior tibial with a 3 mm balloon. I then predilated the popliteal with 5mm balloon followed by a 6mm drug coated balloon.  Intra-arterial

## 2021-10-05 ENCOUNTER — ANESTHESIA EVENT (OUTPATIENT)
Dept: SURGERY | Facility: HOSPITAL | Age: 71
DRG: 253 | End: 2021-10-05
Payer: MEDICARE

## 2021-10-05 ENCOUNTER — ANESTHESIA (OUTPATIENT)
Dept: SURGERY | Facility: HOSPITAL | Age: 71
DRG: 253 | End: 2021-10-05
Payer: MEDICARE

## 2021-10-05 ENCOUNTER — APPOINTMENT (OUTPATIENT)
Dept: GENERAL RADIOLOGY | Facility: HOSPITAL | Age: 71
DRG: 253 | End: 2021-10-05
Attending: STUDENT IN AN ORGANIZED HEALTH CARE EDUCATION/TRAINING PROGRAM
Payer: MEDICARE

## 2021-10-05 PROCEDURE — 28820 AMPUTATION OF TOE: CPT | Performed by: STUDENT IN AN ORGANIZED HEALTH CARE EDUCATION/TRAINING PROGRAM

## 2021-10-05 PROCEDURE — 73620 X-RAY EXAM OF FOOT: CPT | Performed by: STUDENT IN AN ORGANIZED HEALTH CARE EDUCATION/TRAINING PROGRAM

## 2021-10-05 PROCEDURE — 0Y6S0Z0 DETACHMENT AT LEFT 2ND TOE, COMPLETE, OPEN APPROACH: ICD-10-PCS | Performed by: STUDENT IN AN ORGANIZED HEALTH CARE EDUCATION/TRAINING PROGRAM

## 2021-10-05 PROCEDURE — 76942 ECHO GUIDE FOR BIOPSY: CPT | Performed by: ANESTHESIOLOGY

## 2021-10-05 PROCEDURE — 99232 SBSQ HOSP IP/OBS MODERATE 35: CPT | Performed by: INTERNAL MEDICINE

## 2021-10-05 RX ORDER — DEXTROSE MONOHYDRATE 25 G/50ML
50 INJECTION, SOLUTION INTRAVENOUS
Status: DISCONTINUED | OUTPATIENT
Start: 2021-10-05 | End: 2021-10-05 | Stop reason: HOSPADM

## 2021-10-05 RX ORDER — METOCLOPRAMIDE HYDROCHLORIDE 5 MG/ML
10 INJECTION INTRAMUSCULAR; INTRAVENOUS AS NEEDED
Status: DISCONTINUED | OUTPATIENT
Start: 2021-10-05 | End: 2021-10-05 | Stop reason: HOSPADM

## 2021-10-05 RX ORDER — HYDROMORPHONE HYDROCHLORIDE 1 MG/ML
0.4 INJECTION, SOLUTION INTRAMUSCULAR; INTRAVENOUS; SUBCUTANEOUS EVERY 5 MIN PRN
Status: DISCONTINUED | OUTPATIENT
Start: 2021-10-05 | End: 2021-10-05 | Stop reason: HOSPADM

## 2021-10-05 RX ORDER — ONDANSETRON 2 MG/ML
4 INJECTION INTRAMUSCULAR; INTRAVENOUS AS NEEDED
Status: DISCONTINUED | OUTPATIENT
Start: 2021-10-05 | End: 2021-10-05 | Stop reason: HOSPADM

## 2021-10-05 RX ORDER — MEPERIDINE HYDROCHLORIDE 25 MG/ML
12.5 INJECTION INTRAMUSCULAR; INTRAVENOUS; SUBCUTANEOUS AS NEEDED
Status: DISCONTINUED | OUTPATIENT
Start: 2021-10-05 | End: 2021-10-05 | Stop reason: HOSPADM

## 2021-10-05 RX ORDER — PHENYLEPHRINE HCL 10 MG/ML
VIAL (ML) INJECTION AS NEEDED
Status: DISCONTINUED | OUTPATIENT
Start: 2021-10-05 | End: 2021-10-05 | Stop reason: SURG

## 2021-10-05 RX ORDER — MIDAZOLAM HYDROCHLORIDE 1 MG/ML
INJECTION INTRAMUSCULAR; INTRAVENOUS AS NEEDED
Status: DISCONTINUED | OUTPATIENT
Start: 2021-10-05 | End: 2021-10-05 | Stop reason: SURG

## 2021-10-05 RX ORDER — LABETALOL HYDROCHLORIDE 5 MG/ML
5 INJECTION, SOLUTION INTRAVENOUS EVERY 5 MIN PRN
Status: DISCONTINUED | OUTPATIENT
Start: 2021-10-05 | End: 2021-10-05 | Stop reason: HOSPADM

## 2021-10-05 RX ORDER — HYDROCODONE BITARTRATE AND ACETAMINOPHEN 5; 325 MG/1; MG/1
1 TABLET ORAL AS NEEDED
Status: DISCONTINUED | OUTPATIENT
Start: 2021-10-05 | End: 2021-10-05 | Stop reason: HOSPADM

## 2021-10-05 RX ORDER — LIDOCAINE HYDROCHLORIDE 10 MG/ML
INJECTION, SOLUTION EPIDURAL; INFILTRATION; INTRACAUDAL; PERINEURAL AS NEEDED
Status: DISCONTINUED | OUTPATIENT
Start: 2021-10-05 | End: 2021-10-05 | Stop reason: SURG

## 2021-10-05 RX ORDER — NALOXONE HYDROCHLORIDE 0.4 MG/ML
80 INJECTION, SOLUTION INTRAMUSCULAR; INTRAVENOUS; SUBCUTANEOUS AS NEEDED
Status: DISCONTINUED | OUTPATIENT
Start: 2021-10-05 | End: 2021-10-05 | Stop reason: HOSPADM

## 2021-10-05 RX ORDER — SODIUM CHLORIDE, SODIUM LACTATE, POTASSIUM CHLORIDE, CALCIUM CHLORIDE 600; 310; 30; 20 MG/100ML; MG/100ML; MG/100ML; MG/100ML
INJECTION, SOLUTION INTRAVENOUS CONTINUOUS
Status: DISCONTINUED | OUTPATIENT
Start: 2021-10-05 | End: 2021-10-05 | Stop reason: HOSPADM

## 2021-10-05 RX ORDER — ACETAMINOPHEN 500 MG
1000 TABLET ORAL ONCE AS NEEDED
Status: DISCONTINUED | OUTPATIENT
Start: 2021-10-05 | End: 2021-10-05 | Stop reason: HOSPADM

## 2021-10-05 RX ORDER — MIDAZOLAM HYDROCHLORIDE 1 MG/ML
1 INJECTION INTRAMUSCULAR; INTRAVENOUS EVERY 5 MIN PRN
Status: DISCONTINUED | OUTPATIENT
Start: 2021-10-05 | End: 2021-10-05 | Stop reason: HOSPADM

## 2021-10-05 RX ORDER — HYDROCODONE BITARTRATE AND ACETAMINOPHEN 5; 325 MG/1; MG/1
2 TABLET ORAL AS NEEDED
Status: DISCONTINUED | OUTPATIENT
Start: 2021-10-05 | End: 2021-10-05 | Stop reason: HOSPADM

## 2021-10-05 RX ADMIN — LIDOCAINE HYDROCHLORIDE 50 MG: 10 INJECTION, SOLUTION EPIDURAL; INFILTRATION; INTRACAUDAL; PERINEURAL at 11:10:00

## 2021-10-05 RX ADMIN — PHENYLEPHRINE HCL 100 MCG: 10 MG/ML VIAL (ML) INJECTION at 11:11:00

## 2021-10-05 RX ADMIN — SODIUM CHLORIDE, SODIUM LACTATE, POTASSIUM CHLORIDE, CALCIUM CHLORIDE: 600; 310; 30; 20 INJECTION, SOLUTION INTRAVENOUS at 11:48:00

## 2021-10-05 RX ADMIN — MIDAZOLAM HYDROCHLORIDE 2 MG: 1 INJECTION INTRAMUSCULAR; INTRAVENOUS at 10:56:00

## 2021-10-05 RX ADMIN — PHENYLEPHRINE HCL 100 MCG: 10 MG/ML VIAL (ML) INJECTION at 11:22:00

## 2021-10-05 RX ADMIN — CEFAZOLIN SODIUM/WATER 2 G: 2 G/20 ML SYRINGE (ML) INTRAVENOUS at 11:03:00

## 2021-10-05 NOTE — ANESTHESIA PROCEDURE NOTES
Airway  Date/Time: 10/5/2021 11:06 AM  Urgency: elective    Airway not difficult    General Information and Staff    Patient location during procedure: OR  Anesthesiologist: Louie Pastor MD  Performed: anesthesiologist     Indications and Patient Cond

## 2021-10-05 NOTE — CM/SW NOTE
Pt is having surgery for a left second toe amputation today. Possible IV ABX at dc. Pt lives with his dtr.

## 2021-10-05 NOTE — H&P
[address] Patient Status:  Inpatient    1950 MRN [id number]   [facility] 7NE-A Attending Charla Villaseñor, [address] [facility] Day # 6 PCP Fawn Rodriguez MD     Date of Admission: syringe 2 g, 2 g, Intravenous, Q8H  •  glucose (DEX4) oral liquid 15 g, 15 g, Oral, Q15 Min PRN **OR** glucose-vitamin C (DEX-4) chewable tab 4 tablet, 4 tablet, Oral, Q15 Min PRN **OR** dextrose 50 % injection 50 mL, 50 mL, Intravenous, Q15 Min PRN **OR** contractures present to digits 2-5 bilaterally. Muscle strength testing deferred. No pain on palpation noted to lower extremities. Imaging:  No results found.      Laboratory Data:  Recent Labs   Lab 09/28/21  0515 09/28/21  0515 10/04/21  0944   RBC 3.53* with Dr. Conor Mcgee. Appreciate vascular eval.  -Wound cultures show staph aureus.  -Patient receiving Ancef per ID.  -Will continue to follow. -If not OK with vascular/primary team, can reschedule for later this week.  Please reach out with questions or con

## 2021-10-05 NOTE — DIETARY NOTE
Annetta     Admitting diagnosis:  Hyperglycemia [R73.9]  Cellulitis of left lower extremity [X58.872]  Diabetic ulcer of toe of left foot associated with diabetes mellitus of other type, with other ulcer [id number]  Pager [phone]

## 2021-10-05 NOTE — ANESTHESIA PROCEDURE NOTES
[facility]  Performed by: Ruthanna Ganser, MD  Authorized by: Ruthanna Ganser, MD       General Information and Staff    Start Time:  10/5/2021 10:57 AM  End Time:  10/5/2021 11:02 AM  Anesthesiologist:  Ruthanna Ganser, MD  Performed by:   Anesthesi

## 2021-10-05 NOTE — PROGRESS NOTES
[facility] ID PROGR[facility] NOTE     Carlos Patient Status:  Inpatient    1950 MRN [id number]   [facility] 3SW-A Attending [facility] Day # 7 PCP Chandan Tovar MD     Abx: IV Ancef D# (CRESTOR) tab 40 mg, 40 mg, Oral, Nightly  •  sertraline (ZOLOFT) tab 100 mg, 100 mg, Oral, Daily    Review of Systems:  Completed. See pertinent positives and negatives above.     Physical Exam:  Vital signs: Blood pressure 147/58, pulse 53, temperature 97 ATTENDING ADDENDUM     Pt seen an examined independently. Chart reviewed. Agree with above. Note has been reviewed by me and modified as needed. Exam and Impression/ Recs as noted above.   Will follow     Kayce Calderon MD

## 2021-10-05 NOTE — ANESTHESIA POSTPROCEDURE EVALUATION
Shiva Mcelroy Patient Status:  Inpatient   Age/Gender 80year old male MRN [id number]   [facility] SURGERY Attending Sherman Lehman, [address] [facility] Day # 7 PCP Nestor Moran MD       Anesthesia Post-op Note

## 2021-10-05 NOTE — OPERATIVE REPORT
OPERATIVE REPORT     Dimas Pod Patient Status:  Inpatient    1950 MRN [id number]   [facility] 7NE-A Attending Rica Guzman, [address] [facility] Day # 7 PCP Charles Seo MD     Date of Surgery:  10/5/2021    Preoperat limited to, continued infection, dehiscence, need for wound care, need for revisional surgery, need for proximal amputation, loss of limb, and loss of life. Patient understands the risks and agrees to procedure. Written consent has been obtained.   No gua head was inspected and the bone quality was noted to be hard, viable, and adequate, without necrosis. The site was then copiously irrigated with sterile saline. Upon completion of irrigation, healthy bleeding tissue was noted to the amputation site.   It

## 2021-10-05 NOTE — BRIEF OP NOTE
Pre-Operative Diagnosis: Osteomyelitis of second toe of left foot ([facility]) [M86.9]     Post-Operative Diagnosis: Osteomyelitis of second toe of left foot ([address] [M86.9]      Procedure Performed:   Left second digit amputation    Surgeon(s) and Role:     * [employer]

## 2021-10-05 NOTE — PLAN OF CARE
Assumed care at 1930  Tylenol and heat packs given for neck pain  NPO since midnight   R groin incision c/d/i soft  L foot dressing changed by podiatry  Needs met will continue to monitor

## 2021-10-05 NOTE — PROGRESS NOTES
[facility]     Hospitalist Progress Note     Chester Acharya Patient Status:  Inpatient    1950 MRN [id number]   [facility] 3SW-A Attending Vika Yu, [address] [facility] Day # 7 PCP Faustino Abbott MD     Chief Compla results for input(s): CK in the last 168 hours. Inflammatory Markers  No results for input(s): CRP, TANISHA, LDH, DDIMER in the last 168 hours. Imaging: Imaging data reviewed in Epic.     Medications:   • clopidogrel  300 mg Oral Once   • clopidogrel  75 home

## 2021-10-05 NOTE — PROGRESS NOTES
[facility]    PODIATRY PROGRESS NOTE    Stacey Lung Patient Status:  Inpatient    1950 MRN [id number]   [facility] SURGERY Attending Cristina Otero, [address] [facility] Day # 7 PCP Sherrie Corrigan MD     Date of Admissi Intravenous, Q5 Min PRN  •  Naloxone HCl (NARCAN) 0.4 MG/ML injection 80 mcg, 80 mcg, Intravenous, PRN  •  Midazolam HCl (VERSED) 2 MG/2ML injection 1 mg, 1 mg, Intravenous, Q5 Min PRN  •  Meperidine HCl (DEMEROL) 25 MG/ML injection 12.5 mg, 12.5 mg, Intra Subcutaneous, TID CC  •  [MAR Hold] aspirin chewable tab 81 mg, 81 mg, Oral, Daily  •  [MAR Hold] gabapentin (NEURONTIN) cap 300 mg, 300 mg, Oral, TID  •  [MAR Hold] lisinopril tab 20 mg, 20 mg, Oral, BID  •  [MAR Hold] metoprolol succinate (Toprol XL) 24 10/5/2021  11:49 AM

## 2021-10-05 NOTE — PROGRESS NOTES
Vascular Surgery Progress Note    /52 (BP Location: Left arm)   Pulse 53   Temp 98.1 °F (36.7 °C) (Oral)   Resp 14   Ht 5' 11\" (1.803 m)   Wt 220 lb (99.8 kg)   SpO2 97%   BMI 30.68 kg/m²     Recent Labs   Lab 10/04/21  0944   RBC 3.50*   HGB 10.3*

## 2021-10-05 NOTE — ANESTHESIA PREPROCEDURE EVALUATION
PRE-OP EVALUATION    Patient Name: Rocío Soares    Admit Diagnosis: Hyperglycemia [R73.9]  Cellulitis of left lower extremity [H15.095]  Diabetic ulcer of toe of left foot associated with diabetes mellitus of other type, with other ulcer severity ( Units, 0.2 Units/kg, Subcutaneous, Once  glucose (DEX4) oral liquid 15 g, 15 g, Oral, Q15 Min PRN   Or  glucose-vitamin C (DEX-4) chewable tab 4 tablet, 4 tablet, Oral, Q15 Min PRN   Or  dextrose 50 % injection 50 mL, 50 mL, Intravenous, Q15 Min PRN   Or time  aspirin 81 MG Oral Chew Tab, Chew by mouth daily. , Disp: , Rfl: , 9/27/2021 at Unknown time  gabapentin 300 MG Oral Cap, Take 300 mg by mouth 3 (three) times daily. , Disp: , Rfl: , 9/27/2021 at Unknown time  hydrochlorothiazide 25 MG Oral Tab, Take 2 10/04/2021     Lab Results   Component Value Date     10/04/2021    K 4.6 10/04/2021     10/04/2021    CO2 24.0 10/04/2021    BUN 31 (H) 10/04/2021    CREATSERUM 0.98 10/05/2021     (H) 10/04/2021    CA 9.0 10/04/2021     Lab Results   C

## 2021-10-06 VITALS
DIASTOLIC BLOOD PRESSURE: 53 MMHG | HEART RATE: 53 BPM | WEIGHT: 220 LBS | RESPIRATION RATE: 19 BRPM | HEIGHT: 71 IN | TEMPERATURE: 98 F | OXYGEN SATURATION: 98 % | SYSTOLIC BLOOD PRESSURE: 130 MMHG | BODY MASS INDEX: 30.8 KG/M2

## 2021-10-06 PROCEDURE — 99233 SBSQ HOSP IP/OBS HIGH 50: CPT | Performed by: CLINICAL NURSE SPECIALIST

## 2021-10-06 PROCEDURE — 99024 POSTOP FOLLOW-UP VISIT: CPT | Performed by: STUDENT IN AN ORGANIZED HEALTH CARE EDUCATION/TRAINING PROGRAM

## 2021-10-06 PROCEDURE — 99239 HOSP IP/OBS DSCHRG MGMT >30: CPT | Performed by: INTERNAL MEDICINE

## 2021-10-06 RX ORDER — BLOOD SUGAR DIAGNOSTIC
STRIP MISCELLANEOUS
Qty: 100 STRIP | Refills: 2 | Status: SHIPPED | OUTPATIENT
Start: 2021-10-06

## 2021-10-06 RX ORDER — INSULIN LISPRO 100 [IU]/ML
INJECTION, SOLUTION INTRAVENOUS; SUBCUTANEOUS
Qty: 5 EACH | Refills: 0 | Status: SHIPPED | OUTPATIENT
Start: 2021-10-06 | End: 2021-12-28

## 2021-10-06 RX ORDER — CEFADROXIL 500 MG/1
500 CAPSULE ORAL 2 TIMES DAILY
Qty: 20 CAPSULE | Refills: 0 | Status: SHIPPED | OUTPATIENT
Start: 2021-10-06 | End: 2021-10-16

## 2021-10-06 RX ORDER — PEN NEEDLE, DIABETIC 32GX 5/32"
NEEDLE, DISPOSABLE MISCELLANEOUS
Qty: 100 EACH | Refills: 6 | Status: SHIPPED | OUTPATIENT
Start: 2021-10-06 | End: 2021-12-28

## 2021-10-06 RX ORDER — INSULIN GLARGINE 100 [IU]/ML
20 INJECTION, SOLUTION SUBCUTANEOUS NIGHTLY
Qty: 5 EACH | Refills: 0 | Status: SHIPPED | OUTPATIENT
Start: 2021-10-06 | End: 2021-12-28

## 2021-10-06 RX ORDER — METOPROLOL SUCCINATE 25 MG/1
25 TABLET, EXTENDED RELEASE ORAL DAILY
Qty: 30 TABLET | Refills: 0 | Status: SHIPPED | OUTPATIENT
Start: 2021-10-07 | End: 2021-10-27

## 2021-10-06 RX ORDER — BLOOD-GLUCOSE METER
EACH MISCELLANEOUS
Qty: 1 KIT | Refills: 0 | Status: SHIPPED | OUTPATIENT
Start: 2021-10-06

## 2021-10-06 NOTE — PROGRESS NOTES
[facility]    PODIATRY PROGRESS NOTE    Rocío Soares Patient Status:  Inpatient    1950 MRN [id number]   [facility] SURGERY Attending Kay Foster, [address] [facility] [address] Day # 8 PCP Rosaura Pena MD     Date of Admissi acetaminophen (TYLENOL) tab 650 mg, 650 mg, Oral, Q6H PRN  •  ondansetron (ZOFRAN) injection 4 mg, 4 mg, Intravenous, Q6H PRN  •  metoclopramide (REGLAN) injection 5 mg, 5 mg, Intravenous, Q8H PRN  •  Insulin Aspart Pen (NOVOLOG) 100 UNIT/ML flexpen 2-10 U RDW 13.8   WBC 6.7   .0       Impression and Plan:  Patient Active Problem List:     Diabetic foot ulcer ([facility] [address]     Hyperglycemia     Cellulitis of left lower extremity     Diabetic ulcer of toe of left foot associated with diabetes mellitus of oth

## 2021-10-06 NOTE — CONSULTS
[facility]  Diabetes Consult Note    [facility] Patient Status:  Inpatient    1950 MRN [id number]   [facility] 7NE-A Attending Lexii Ge DO   Hosp Day # 8 PCP Brandie Jimenez MD     Reason for Consult:     Re left foot infection. Assessment/Recommendations: This very pleasant gentleman moved to [place] from [place], [place] in May and lives with his daughter.   Last August he lost his wife due to a chronic illness and states he [facility] following discharge and outpatient diabetes education to work on meal planning. He stated he would attend and appreciated the help in getting appointments.       Education Provided:    · Taught survival skills:  · P RECOMMENDATIONS:    · Medicare:  · Insulin:  Humalog, Lantus and Toujeo - DOES NOT cover Novolog  · Supplies:  BD pen needles (Christie)  · Glucometer:  One Touch    PROVIDER F/U RECOMMENDATIONS:    · Patient's current PCP  · Endocrinologist    A total of 57 m

## 2021-10-06 NOTE — CM/SW NOTE
Pt will not need IV ABX at dc. No dc needs anticipated. / to remain available for support and/or discharge planning.

## 2021-10-06 NOTE — DISCHARGE SUMMARY
[facility] [place] HOSPITALIST  DISCHARGE SUMMARY     Goldy Carbone Patient Status:  Inpatient    1950 MRN [id number]   [facility] 7NE-A Attending Juliette Ferrari,    Hosp Day # 8 PCP Jay Bolivar MD     Date of Admission:  hospitalization:   ? Peripheral Angiogram 10/4 with angioplasty of L peroneal, posterior tibial, and popliteal aa.   • Left 2nd digit OM    Incidental or significant findings and recommendations (brief descriptions):  • n/a    Lab/Test results pending at [facility] Refills: 0     rosuvastatin 40 MG Tabs  Commonly known as: CRESTOR      Take 40 mg by mouth nightly. Refills: 0     sertraline 100 MG Tabs  Commonly known as: ZOLOFT      Take 100 mg by mouth daily.    Refills: 0     Tresiba 100 UNIT/ML Soln  Generic drug .  -----------------------------------------------------------------------------------------------  PATIENT DISCHARGE INSTRUCTIONS: See electronic chart    Jamshid Tomlin DO 10/6/2021    Time spent:  > 30 minutes

## 2021-10-06 NOTE — PROGRESS NOTES
[facility] ID PROGRESS NOTE    India Prescott Patient Status:  Inpatient    1950 MRN [id number]   [facility]SW[facility]A [facility] Ricardo Garcia MD     Abx: IV Ancef D# rosuvastatin (CRESTOR) tab 40 mg, 40 mg, Oral, Nightly  •  sertraline (ZOLOFT) tab 100 mg, 100 mg, Oral, Daily    Review of Systems:  Completed. See pertinent positives and negatives above.     Physical Exam:  Vital signs: Blood pressure 130/53, pulse 53, t phone.     Jennifer Prieto PA-C

## 2021-10-06 NOTE — PHYSICAL THERAPY NOTE
PHYSICAL THERAPY QUICK EVALUATION - INPATIENT    Room Number: 6360/8745-I  Evaluation Date: 10/6/2021  Presenting Problem: s/p L second digit amputation 10/5  Physician Order: PT Eval and Treat     Pt presented to the ED 9/28 with diabetic foot ulcer L s Other (Comment) (Heel weight bearing)    PAIN ASSESSMENT  Ratin  Location: denies pain       RANGE OF MOTION AND STRENGTH ASSESSMENT  Upper extremity ROM and strength are within functional limits     Lower extremity ROM is within functional limits ambulation distance parameters - deferred to surgeon. Patient End of Session: In bed; Needs met; Call light within reach; RN aware of session/findings; All patient questions and concerns addressed;  Alarm set    ASSESSMENT   Patient is a 80year old m

## 2021-10-06 NOTE — PLAN OF CARE
Assumed care at 0730.   VSS - NSR/SB  L second metatarsal amputation - c/d/I  Heel weight bearing  R groin incision c/d/i  Denies pain  Ok to resume [facility] tomorrow per podiatry  Pt. & daughter updated on POC  Possible discharge tomorrow      Problem: Diabetes/ for pressure ulcer development  - Assess and document skin integrity  - Monitor for areas of redness and/or skin breakdown  - Initiate interventions, skin care algorithm/standards of care as needed  Outcome: Progressing  Goal: Incision(s), wounds(s) or bekah

## 2021-10-06 NOTE — PLAN OF CARE
Assumed care at 1930  L second digit amputation dressing d/i w/ some old drainage  R groin incision c/d/i  Tylenol and heat packs given for neck pain

## 2021-10-06 NOTE — PLAN OF CARE
NURSING DISCHARGE NOTE    Discharged Home via Wheelchair. Accompanied by Family member  Belongings Taken by patient/family.   PIV and tele dc'd   Ashley Ravi provided at DC  Medication changes, follow up appointments, and DC instructions reviewed  DM educat

## 2021-10-07 ENCOUNTER — TELEPHONE (OUTPATIENT)
Dept: PODIATRY CLINIC | Facility: CLINIC | Age: 71
End: 2021-10-07

## 2021-10-07 ENCOUNTER — PATIENT OUTREACH (OUTPATIENT)
Dept: CASE MANAGEMENT | Age: 71
End: 2021-10-07

## 2021-10-07 DIAGNOSIS — Z02.9 ENCOUNTERS FOR UNSPECIFIED ADMINISTRATIVE PURPOSE: ICD-10-CM

## 2021-10-07 PROCEDURE — 1111F DSCHRG MED/CURRENT MED MERGE: CPT

## 2021-10-07 NOTE — PAYOR COMM NOTE
Discharge Notification    Patient Name: Shellee Homans: [address] [facility] #: [id number]  Authorization Number: [id number]  Admit Date/Time: 9/28/2021 12:39 AM  Discharge Date/Time: 10/6/2021 6:23 PM

## 2021-10-07 NOTE — PROGRESS NOTES
The daughter is requesting assistance with scheduling for the following providers:        Follow up with Rossana Jones DPM in 2 week(s)  office will call you for follow up  [address] [place] [address],  [address] [place] [address]  [phone]  Follow up with Denisse Flores,

## 2021-10-07 NOTE — PROGRESS NOTES
Initial Post Discharge Follow Up   Discharge Date: 10/6/21  Contact Date: 10/7/2021    Consent Verification:  Assessment Completed With: Caregiver: Rochelle Carcamo Permission received per patient?  verbal  HIPAA Verified?   Yes    Discharge Dx:     L 2nd toe osteo well  • Do you have any questions about your discharge instructions?   No; NCM did review the following in detail:    Patient Instructions      Elevate left leg off of bed  OK to heel weight bear to left foot with surgical shoe--patient should try and keep (HUMALOG KWIKPEN) 100 UNIT/ML Subcutaneous Solution Pen-injector Inject 2-10 Units into the skin 3 (three) times daily with meals.  Give 2 unit if blood glucose 141-180 mg/dL Give 3 units if blood glucose 181-220 mg/dL Give 6 units if blood glucose 221-260 go over your medications with you to make sure we are not missing anything? yes  • Are there any reasons that keep you from taking your medication as prescribed? Yes; as mentioned above. Are you having any concerns with constipation?  No    Referrals/order daughter with a focus on weight restrictions, post-op directions, antibiotic therapy, fall precautions, and DM medication changes. All medications were reviewed and educated on the importance of taking the medications as directed.   Patient symptoms were as

## 2021-10-07 NOTE — TELEPHONE ENCOUNTER
Please schedule post op appointment for patient in next 7-10 days. Patient would prefer Wednesday evening appt if possible. Thanks.

## 2021-10-08 NOTE — PROGRESS NOTES
1st attempt TCC, Podatry, DM Education and Endo apt request    Follow up with Julia Howe DPM in 2 week(s)  office will call you for follow up  [address],  [address]  [phone]  Apt made for Wed.  Oct. 13th @5:45pm    Follow up

## 2021-10-12 ENCOUNTER — TELEMEDICINE (OUTPATIENT)
Dept: INTERNAL MEDICINE CLINIC | Facility: CLINIC | Age: 71
End: 2021-10-12

## 2021-10-12 DIAGNOSIS — L97.529 DIABETIC ULCER OF TOE OF LEFT FOOT ASSOCIATED WITH TYPE 2 DIABETES MELLITUS, UNSPECIFIED ULCER STAGE (HCC): ICD-10-CM

## 2021-10-12 DIAGNOSIS — E11.65 TYPE 2 DIABETES MELLITUS WITH HYPERGLYCEMIA, WITH LONG-TERM CURRENT USE OF INSULIN (HCC): ICD-10-CM

## 2021-10-12 DIAGNOSIS — L03.116 CELLULITIS OF LEFT LOWER EXTREMITY: Primary | ICD-10-CM

## 2021-10-12 DIAGNOSIS — Z79.4 TYPE 2 DIABETES MELLITUS WITH HYPERGLYCEMIA, WITH LONG-TERM CURRENT USE OF INSULIN (HCC): ICD-10-CM

## 2021-10-12 DIAGNOSIS — E11.621 DIABETIC ULCER OF TOE OF LEFT FOOT ASSOCIATED WITH TYPE 2 DIABETES MELLITUS, UNSPECIFIED ULCER STAGE (HCC): ICD-10-CM

## 2021-10-12 DIAGNOSIS — I73.9 PAD (PERIPHERAL ARTERY DISEASE) (HCC): ICD-10-CM

## 2021-10-12 DIAGNOSIS — M86.9 OSTEOMYELITIS OF SECOND TOE OF LEFT FOOT (HCC): ICD-10-CM

## 2021-10-12 PROCEDURE — 1111F DSCHRG MED/CURRENT MED MERGE: CPT | Performed by: NURSE PRACTITIONER

## 2021-10-12 PROCEDURE — 99495 TRANSJ CARE MGMT MOD F2F 14D: CPT | Performed by: NURSE PRACTITIONER

## 2021-10-12 NOTE — PROGRESS NOTES
TRANSITIONAL CARE CLINIC PHARMACIST MEDICATION RECONCILIATION        Javier Olivarez MRN [id number]    1950 PCP Aravind Lugo MD       Comments: Medication history completed by [facility] Pharmacist with the patient and daught HCl 500 MG Oral Tab Take 500 mg by mouth 2 (two) times daily with meals. Medication Adherence Assessment:   Patient reports he has started taking the cefadroxil as instructed on hospital discharge.  Instructed patient to continue taking until he runs ou

## 2021-10-12 NOTE — PROGRESS NOTES
Video Visit  [address]      Please note that the following visit was completed using two-way, real-time interactive audio and video communication.   This has been done in good terry to provide continuity of care in the best hospital follow-up. He is accompanied by his daughter Munira Suggs. He reports he is doing \"great\" since discharge home. He is noted with dressing to left foot and is original surgical dressing and will be changed for the first time by podiatry on 10/13.  Etta Joyce for 10 days. (Patient not taking: Reported on 10/7/2021), Disp: 20 capsule, Rfl: 0  metoprolol succinate 25 MG Oral Tablet 24 Hr, Take 1 tablet (25 mg total) by mouth daily.  (Patient not taking: Reported on 10/7/2021), Disp: 30 tablet, Rfl: 0  insulin glar History:   Procedure Laterality Date   • OPEN HEART SURGICAL PROFILE        No family history on file.    Social History    Tobacco Use      Smoking status: Never Smoker      Smokeless tobacco: Never Used    Vaping Use      Vaping Use: Never used    Alcohol or anxiety    PHYSICAL EXAM:  There were no vitals filed for this visit.     GENERAL: well developed, well nourished, in no apparent distress, daughter and patient are good historians  INTEGUMENTARY: warm, dry, incision to left foot  HEENT: atraumatic, norm (LANTUS SOLOSTAR) 100 UNIT/ML Subcutaneous Solution Pen-injector, Inject 20 Units into the skin nightly., Disp: 5 each, Rfl: 0  Insulin Lispro, 1 Unit Dial, (HUMALOG KWIKPEN) 100 UNIT/ML Subcutaneous Solution Pen-injector, Inject 2-10 Units into the skin 3 documents  ? Reviewed need for follow-up on pending tests, need for follow-up on diagnostic tests and need for follow-up on treatments  ? specialists already aware of assumption/resumption of care  ?  Education given to patient and family on self-management

## 2021-10-13 ENCOUNTER — TELEPHONE (OUTPATIENT)
Dept: PODIATRY CLINIC | Facility: CLINIC | Age: 71
End: 2021-10-13

## 2021-10-13 ENCOUNTER — OFFICE VISIT (OUTPATIENT)
Dept: PODIATRY CLINIC | Facility: CLINIC | Age: 71
End: 2021-10-13
Payer: COMMERCIAL

## 2021-10-13 DIAGNOSIS — Z47.89 ORTHOPEDIC AFTERCARE: ICD-10-CM

## 2021-10-13 DIAGNOSIS — E11.42 DIABETIC POLYNEUROPATHY ASSOCIATED WITH TYPE 2 DIABETES MELLITUS (HCC): ICD-10-CM

## 2021-10-13 DIAGNOSIS — I73.9 PAD (PERIPHERAL ARTERY DISEASE) (HCC): Primary | ICD-10-CM

## 2021-10-13 PROCEDURE — 99024 POSTOP FOLLOW-UP VISIT: CPT | Performed by: STUDENT IN AN ORGANIZED HEALTH CARE EDUCATION/TRAINING PROGRAM

## 2021-10-13 NOTE — PROGRESS NOTES
[facility] Podiatry  Progress Note    Pat Thompson is a 80year old male. Patient presents with:  Post-Op: 1st post op visit on left foot 2nd toe amputation. denies pain.         HPI:     Patient is a pleasant 78-year-old male with history of diabet times daily with meals.  Give 2 unit if blood glucose 141-180 mg/dL Give 3 units if blood glucose 181-220 mg/dL Give 6 units if blood glucose 221-260 mg/dL Give 8 units if blood glucose 261-300 mg/dL Give 10 units if blood glucose 301-350 mg/dL Call Physici distress  EXTREMITIES:    Derm: Incision site is well coapted with sutures intact.  No drainage, malodor, purulence, hematoma, or other concerns present.     Vascular: Dorsalis pedis and posterior tibial pulses are are lightly palpable bilaterally.  Feet ar

## 2021-10-16 NOTE — PATIENT INSTRUCTIONS
-Leave dressings clean, dry, and intact to left foot. -Use surgical shoe at all times with ambulation. Minimize ambulation while surgical site heals.  -Seek immediate medical attention if any acute signs of infection arise.

## 2021-10-21 ENCOUNTER — OFFICE VISIT (OUTPATIENT)
Dept: PODIATRY CLINIC | Facility: CLINIC | Age: 71
End: 2021-10-21
Payer: COMMERCIAL

## 2021-10-21 DIAGNOSIS — Z89.422 HISTORY OF AMPUTATION OF LESSER TOE, LEFT (HCC): ICD-10-CM

## 2021-10-21 DIAGNOSIS — E11.42 DIABETIC POLYNEUROPATHY ASSOCIATED WITH TYPE 2 DIABETES MELLITUS (HCC): Primary | ICD-10-CM

## 2021-10-21 DIAGNOSIS — I73.9 PAD (PERIPHERAL ARTERY DISEASE) (HCC): ICD-10-CM

## 2021-10-21 PROCEDURE — 99024 POSTOP FOLLOW-UP VISIT: CPT | Performed by: STUDENT IN AN ORGANIZED HEALTH CARE EDUCATION/TRAINING PROGRAM

## 2021-10-21 NOTE — PROGRESS NOTES
[address] [facility] Podiatry  Progress Note    Anushka Bangura is a 80year old male. Patient presents with:  Post-Op: Left foot 2nd toe amputation denies any drainage no bleeding. no pain today in the office.         HPI:     Patient is a pleasant 73-year-old glucose 261-300 mg/dL Give 10 units if blood glucose 301-350 mg/dL Call Physician if blood glucose is greater than 351 mg/dl 5 each 0   • Insulin Pen Needle (BD PEN NEEDLE STEFANI U/F) 32G X 4 MM Does not apply Misc Use a new pen needle with each injection 10 concerns present.     Vascular: Dorsalis pedis and posterior tibial pulses are are lightly palpable bilaterally.  Feet are warm bilaterally.  CFT < 5 seconds to digits bilaterally.   1+ pitting edema noted bilaterally.     Neuro: Decreased protective sensat

## 2021-10-23 NOTE — PATIENT INSTRUCTIONS
-Acquire new diabetic shoes and inserts as per prescription.  -Leave dressings clean, dry and intact for 1-2 more days.   At this time he may remove dressings and begin showering.  -Recommend that he begin using compression stockings to lower extremities to

## 2021-10-26 ENCOUNTER — NURSE ONLY (OUTPATIENT)
Dept: ENDOCRINOLOGY CLINIC | Facility: CLINIC | Age: 71
End: 2021-10-26
Payer: COMMERCIAL

## 2021-10-26 VITALS — WEIGHT: 186.19 LBS | BODY MASS INDEX: 26 KG/M2

## 2021-10-26 DIAGNOSIS — Z79.4 TYPE 2 DIABETES MELLITUS WITH HYPERGLYCEMIA, WITH LONG-TERM CURRENT USE OF INSULIN (HCC): ICD-10-CM

## 2021-10-26 DIAGNOSIS — E11.65 TYPE 2 DIABETES MELLITUS WITH HYPERGLYCEMIA, WITH LONG-TERM CURRENT USE OF INSULIN (HCC): ICD-10-CM

## 2021-10-26 PROCEDURE — G0108 DIAB MANAGE TRN  PER INDIV: HCPCS | Performed by: DIETITIAN, REGISTERED

## 2021-10-26 NOTE — PROGRESS NOTES
Kathie Bunch  : 1950 attended Step 1 Diabetic Education:  Pt.  Accompanied by daughter, Jasmeet Levin to visit    Date: 10/26/2021  Referring Provider: FIFI Rodriguez  Start time: 4:30pm End time: 5:30pm       HgbA1C (%)   Date Value   2021 14.5 ( 20 units at 7:30pm   Action/Side Effects/Interactions/Precautions/Missed Doses: Basal insulin released over 24 hr period of time w/decreased risk for hypoglycemia. May not be mixed with other insulins. Requires injection at the same time every day.   Type/D 500 mg 1 tab twice daily before meals  Use Rule of 15 to treat low blood sugar  Follow-up with Ilsa Castellano, PCP as scheduled  Follow-up with BRYCE Amaro  Written materials provided for all areas covered.     Patient verbalized understanding and has no fu

## 2021-10-27 ENCOUNTER — OFFICE VISIT (OUTPATIENT)
Dept: INTERNAL MEDICINE CLINIC | Facility: CLINIC | Age: 71
End: 2021-10-27
Payer: COMMERCIAL

## 2021-10-27 VITALS
SYSTOLIC BLOOD PRESSURE: 132 MMHG | RESPIRATION RATE: 12 BRPM | OXYGEN SATURATION: 99 % | BODY MASS INDEX: 26.04 KG/M2 | HEIGHT: 71 IN | WEIGHT: 186 LBS | HEART RATE: 55 BPM | DIASTOLIC BLOOD PRESSURE: 78 MMHG | TEMPERATURE: 98 F

## 2021-10-27 DIAGNOSIS — L97.529 DIABETIC ULCER OF TOE OF LEFT FOOT ASSOCIATED WITH TYPE 2 DIABETES MELLITUS, UNSPECIFIED ULCER STAGE (HCC): ICD-10-CM

## 2021-10-27 DIAGNOSIS — S98.132A AMPUTATION OF TOE OF LEFT FOOT (HCC): ICD-10-CM

## 2021-10-27 DIAGNOSIS — Z79.4 TYPE 2 DIABETES MELLITUS WITH HYPERGLYCEMIA, WITH LONG-TERM CURRENT USE OF INSULIN (HCC): Primary | ICD-10-CM

## 2021-10-27 DIAGNOSIS — I73.9 PAD (PERIPHERAL ARTERY DISEASE) (HCC): ICD-10-CM

## 2021-10-27 DIAGNOSIS — Z12.5 PROSTATE CANCER SCREENING: ICD-10-CM

## 2021-10-27 DIAGNOSIS — F32.0 MILD MAJOR DEPRESSION (HCC): ICD-10-CM

## 2021-10-27 DIAGNOSIS — M79.2 PERIPHERAL NEUROPATHIC PAIN: ICD-10-CM

## 2021-10-27 DIAGNOSIS — E11.621 DIABETIC ULCER OF TOE OF LEFT FOOT ASSOCIATED WITH TYPE 2 DIABETES MELLITUS, UNSPECIFIED ULCER STAGE (HCC): ICD-10-CM

## 2021-10-27 DIAGNOSIS — I25.10 CORONARY ARTERY DISEASE INVOLVING NATIVE HEART WITHOUT ANGINA PECTORIS, UNSPECIFIED VESSEL OR LESION TYPE: ICD-10-CM

## 2021-10-27 DIAGNOSIS — E11.65 TYPE 2 DIABETES MELLITUS WITH HYPERGLYCEMIA, WITH LONG-TERM CURRENT USE OF INSULIN (HCC): Primary | ICD-10-CM

## 2021-10-27 DIAGNOSIS — I10 PRIMARY HYPERTENSION: ICD-10-CM

## 2021-10-27 PROBLEM — E78.5 HYPERLIPIDEMIA: Status: ACTIVE | Noted: 2021-10-27

## 2021-10-27 PROBLEM — E13.621: Status: RESOLVED | Noted: 2021-09-28 | Resolved: 2021-10-27

## 2021-10-27 PROBLEM — L03.116 CELLULITIS OF LEFT LOWER EXTREMITY: Status: RESOLVED | Noted: 2021-09-28 | Resolved: 2021-10-27

## 2021-10-27 PROBLEM — L97.528: Status: RESOLVED | Noted: 2021-09-28 | Resolved: 2021-10-27

## 2021-10-27 PROBLEM — R73.9 HYPERGLYCEMIA: Status: RESOLVED | Noted: 2021-09-28 | Resolved: 2021-10-27

## 2021-10-27 PROCEDURE — 3075F SYST BP GE 130 - 139MM HG: CPT | Performed by: NURSE PRACTITIONER

## 2021-10-27 PROCEDURE — 3008F BODY MASS INDEX DOCD: CPT | Performed by: NURSE PRACTITIONER

## 2021-10-27 PROCEDURE — 99214 OFFICE O/P EST MOD 30 MIN: CPT | Performed by: NURSE PRACTITIONER

## 2021-10-27 PROCEDURE — 3078F DIAST BP <80 MM HG: CPT | Performed by: NURSE PRACTITIONER

## 2021-10-27 RX ORDER — CLOPIDOGREL BISULFATE 75 MG/1
75 TABLET ORAL DAILY
Qty: 90 TABLET | Refills: 1 | Status: SHIPPED | OUTPATIENT
Start: 2021-10-27

## 2021-10-27 RX ORDER — LISINOPRIL 20 MG/1
20 TABLET ORAL DAILY
Qty: 90 TABLET | Refills: 1 | Status: SHIPPED | OUTPATIENT
Start: 2021-10-27

## 2021-10-27 RX ORDER — GABAPENTIN 300 MG/1
300 CAPSULE ORAL NIGHTLY
Qty: 90 CAPSULE | Refills: 1 | Status: SHIPPED | OUTPATIENT
Start: 2021-10-27

## 2021-10-27 RX ORDER — ROSUVASTATIN CALCIUM 40 MG/1
40 TABLET, COATED ORAL NIGHTLY
Qty: 90 TABLET | Refills: 1 | Status: SHIPPED | OUTPATIENT
Start: 2021-10-27

## 2021-10-27 RX ORDER — METOPROLOL SUCCINATE 25 MG/1
25 TABLET, EXTENDED RELEASE ORAL DAILY
Qty: 30 TABLET | Refills: 0 | Status: SHIPPED | OUTPATIENT
Start: 2021-10-27

## 2021-10-27 RX ORDER — SERTRALINE HYDROCHLORIDE 100 MG/1
100 TABLET, FILM COATED ORAL DAILY
Qty: 90 TABLET | Refills: 1 | Status: SHIPPED | OUTPATIENT
Start: 2021-10-27

## 2021-10-27 NOTE — PROGRESS NOTES
HPI:    Patient ID: Amna Cruz is a 80year old male.     Patient presents with:  Hospital F/U: had amputation on 2nd digit of left food   Physical      Super sweet gentleman who is new to our office, moved from [place] in June with his daughter Raymond Lebron by mouth daily. 90 tablet 1   • gabapentin 300 MG Oral Cap Take 1 capsule (300 mg total) by mouth nightly. 90 capsule 1   • metFORMIN 500 MG Oral Tab Take 1 tablet (500 mg total) by mouth 2 (two) times daily with meals.  180 tablet 1   • Insulin Lispro, 1 U 24.0 10/04/2021     Lab Results   Component Value Date    WBC 6.7 10/04/2021    RBC 3.50 (L) 10/04/2021    HGB 10.3 (L) 10/04/2021    HCT 32.8 (L) 10/04/2021    MCV 93.7 10/04/2021    MCH 29.4 10/04/2021    MCHC 31.4 10/04/2021    RDW 13.8 10/04/2021    PL both lower legs/feet is decreased as well. ASSESSMENT/PLAN:   Diagnoses and all orders for this visit:    Type 2 diabetes mellitus with hyperglycemia, with long-term current use of insulin ([facility] [address]- he is currently taking medication as prescribed.  Pete Sorto

## 2021-11-01 NOTE — PROGRESS NOTES
Patient presents with:  Diabetes: New, pt brought meter     HPI:   Jared Schafer is a 80year old male who presents for an initial consult for management of diabetes with his daughter Arlet Rico. Moved here from [place] with daughter in the early summer.  Last A Evaluation   1.  Baseline glucose in target range  2. postprandial elevation  3. am and overnight episodes of hypoglycemia       Wt Readings from Last 3 Encounters:  11/02/21 : 184 lb (83.5 kg)  10/27/21 : 186 lb (84.4 kg)  10/26/21 : 186 lb 3.2 oz (84.5 3 units if blood glucose 181-220 mg/dL Give 6 units if blood glucose 221-260 mg/dL Give 8 units if blood glucose 261-300 mg/dL Give 10 units if blood glucose 301-350 mg/dL Call Physician if blood glucose is greater than 351 mg/dl  Insulin Pen Needle (BD PE 25.66 kg/m² as calculated from the following:    Height as of this encounter: 5' 11\" (1.803 m). Weight as of this encounter: 184 lb (83.5 kg).    Vitals reviewed  Constitutional: Normal appearance, no acute distress  Skin: no signs of lipohypertrophy of already taking aspirin     DM Health Maintenance  Nephropathy screening: check microalb. continue lisinopril rx. Last dilated eye exam: No data recorded Exam shows retinopathy?  No data recorded  Last diabetic foot exam: Last Foot Exam: 10/27/21  Date of

## 2021-11-02 ENCOUNTER — OFFICE VISIT (OUTPATIENT)
Dept: ENDOCRINOLOGY CLINIC | Facility: CLINIC | Age: 71
End: 2021-11-02
Payer: COMMERCIAL

## 2021-11-02 ENCOUNTER — TELEPHONE (OUTPATIENT)
Dept: ENDOCRINOLOGY CLINIC | Facility: CLINIC | Age: 71
End: 2021-11-02

## 2021-11-02 VITALS
BODY MASS INDEX: 25.76 KG/M2 | HEIGHT: 71 IN | SYSTOLIC BLOOD PRESSURE: 120 MMHG | DIASTOLIC BLOOD PRESSURE: 68 MMHG | RESPIRATION RATE: 20 BRPM | HEART RATE: 68 BPM | WEIGHT: 184 LBS | OXYGEN SATURATION: 98 %

## 2021-11-02 DIAGNOSIS — Z79.4 TYPE 2 DIABETES MELLITUS WITH HYPERGLYCEMIA, WITH LONG-TERM CURRENT USE OF INSULIN (HCC): Primary | ICD-10-CM

## 2021-11-02 DIAGNOSIS — E11.65 TYPE 2 DIABETES MELLITUS WITH HYPERGLYCEMIA, WITH LONG-TERM CURRENT USE OF INSULIN (HCC): Primary | ICD-10-CM

## 2021-11-02 PROCEDURE — 99215 OFFICE O/P EST HI 40 MIN: CPT | Performed by: NURSE PRACTITIONER

## 2021-11-02 PROCEDURE — 82570 ASSAY OF URINE CREATININE: CPT | Performed by: NURSE PRACTITIONER

## 2021-11-02 PROCEDURE — 95251 CONT GLUC MNTR ANALYSIS I&R: CPT | Performed by: NURSE PRACTITIONER

## 2021-11-02 PROCEDURE — 82043 UR ALBUMIN QUANTITATIVE: CPT | Performed by: NURSE PRACTITIONER

## 2021-11-02 PROCEDURE — 3078F DIAST BP <80 MM HG: CPT | Performed by: NURSE PRACTITIONER

## 2021-11-02 PROCEDURE — 83036 HEMOGLOBIN GLYCOSYLATED A1C: CPT | Performed by: NURSE PRACTITIONER

## 2021-11-02 PROCEDURE — 3074F SYST BP LT 130 MM HG: CPT | Performed by: NURSE PRACTITIONER

## 2021-11-02 PROCEDURE — 3008F BODY MASS INDEX DOCD: CPT | Performed by: NURSE PRACTITIONER

## 2021-11-02 RX ORDER — GLIMEPIRIDE 1 MG/1
1 TABLET ORAL
Qty: 90 TABLET | Refills: 0 | Status: SHIPPED | OUTPATIENT
Start: 2021-11-02 | End: 2021-12-15

## 2021-11-02 NOTE — PATIENT INSTRUCTIONS
We are her to support you with Diabetes but please remember that you still need your primary care doctor for your routine health maintenance. Last A1c value was 9.3% done 11/2/2021.      This test provides us with your average blood sugar for the past 3 m tablets  6 oz. regular soda   7-8 jelly beans    3. Recheck blood glucose after 10-15 minutes. If blood glucose is still low (less than 70 mg/dl) repeat the treatment (step 2). 4. If your next meal is more than one hour away, eat a small snack.     5. If

## 2021-11-04 ENCOUNTER — OFFICE VISIT (OUTPATIENT)
Dept: PODIATRY CLINIC | Facility: CLINIC | Age: 71
End: 2021-11-04
Payer: COMMERCIAL

## 2021-11-04 ENCOUNTER — TELEPHONE (OUTPATIENT)
Dept: ENDOCRINOLOGY CLINIC | Facility: CLINIC | Age: 71
End: 2021-11-04

## 2021-11-04 DIAGNOSIS — Z47.89 ORTHOPEDIC AFTERCARE: Primary | ICD-10-CM

## 2021-11-04 DIAGNOSIS — I73.9 PAD (PERIPHERAL ARTERY DISEASE) (HCC): ICD-10-CM

## 2021-11-04 DIAGNOSIS — E11.42 DIABETIC POLYNEUROPATHY ASSOCIATED WITH TYPE 2 DIABETES MELLITUS (HCC): ICD-10-CM

## 2021-11-04 DIAGNOSIS — Z89.422 HISTORY OF AMPUTATION OF LESSER TOE, LEFT (HCC): ICD-10-CM

## 2021-11-04 PROCEDURE — 99024 POSTOP FOLLOW-UP VISIT: CPT | Performed by: STUDENT IN AN ORGANIZED HEALTH CARE EDUCATION/TRAINING PROGRAM

## 2021-11-04 NOTE — TELEPHONE ENCOUNTER
Last two nights patient has dropped in the 60's. Nixon Joshi is at 6 pm and snack of fruit before bed. Lantus 17 units at night this was decreased at last visit and rapid acting insulin discontinued at last visit. Spoke to daughter. .. she was at work an

## 2021-11-04 NOTE — TELEPHONE ENCOUNTER
Spoke to patient's daughter Brielle Neal - instructed to reduce Lantus from 17 to 7 units. To call if lows continue or with any other concerns. Verbalized understanding and agreed to plan.

## 2021-11-04 NOTE — TELEPHONE ENCOUNTER
Vinnie Mejia's daughter called.  His sugars in morning are still dropping to 64-and 65 lissa wanted to be informed if they were still dropping

## 2021-11-07 NOTE — PROGRESS NOTES
[facility], [facility] Podiatry  Progress Note    Willa Elmore is a 80year old male. Patient presents with: Follow - Up: left foot 2nd digit. denies pain.         HPI:     Patient is a pleasant 67-year-old male with history of diabetes and peripheral vascular signs/symptoms of hypoglycemia 100 strip 2   • OneTouch Delica Lancets Fine Does not apply Misc Test before meals; signs/symptoms of hypoglycemia 100 each 2   • Blood Glucose Monitoring Suppl (ONETOUCH VERIO FLEX SYSTEM) w/Device Does not apply Kit Test be with exertion  CARDIOVASCULAR: denies chest pain on exertion  GI: denies abdominal pain and denies heartburn  NEURO: denies headaches  MUSCULO: denies arthritis, back pain      EXAM:   There were no vitals taken for this visit.   GENERAL: well developed, we patient to continue normal bathing routine to left foot as this amputation site is well-healed.  -Patient's most recent hemoglobin A1c is 9.3% (down from 14.5%).   He should continue working with primary doctor to gain better blood glucose control.  -Jamie Dodd

## 2021-11-24 ENCOUNTER — TELEPHONE (OUTPATIENT)
Dept: ENDOCRINOLOGY CLINIC | Facility: CLINIC | Age: 71
End: 2021-11-24

## 2021-11-24 NOTE — TELEPHONE ENCOUNTER
Spoke with daughter Abby Ramirez who states they needed to cancel visit today but have rescheduled for a later date. Daughter is concerned however about medications. She has further reduced the Lantus to 5 units daily to avoid hypoglycemia.  FBG now ranges from 10

## 2021-11-24 NOTE — TELEPHONE ENCOUNTER
Order was not sent to pharmacy. There are no pharmacy benefits for Medicare Advantage plans. Faxed DME order to  Anna Montero [phone]. Confirmation received. Copy in brown folder.

## 2021-12-01 NOTE — TELEPHONE ENCOUNTER
Called Jensen at [address] [place] [address] to check the status of the order for patient's Mederos 2.

## 2021-12-06 ENCOUNTER — TELEPHONE (OUTPATIENT)
Dept: ENDOCRINOLOGY CLINIC | Facility: CLINIC | Age: 71
End: 2021-12-06

## 2021-12-06 NOTE — TELEPHONE ENCOUNTER
Supplying Leon Molina 2 sample. Patient is running out on Wednesday but did her from [address] [place] [address] stating they are working on approval through insurance.

## 2021-12-07 ENCOUNTER — NURSE ONLY (OUTPATIENT)
Dept: ENDOCRINOLOGY CLINIC | Facility: CLINIC | Age: 71
End: 2021-12-07
Payer: COMMERCIAL

## 2021-12-07 NOTE — TELEPHONE ENCOUNTER
Spoke to [place] with [address]. Waiting on approval from [facility] which was submitted on 11/29/21. Patient is covered at 100% once they get the approval from insurance. He will contact us next week with an updated status.

## 2021-12-07 NOTE — TELEPHONE ENCOUNTER
I think this was routed to the wrong [facility]. I have not seen this patient before, nor does he have any upcoming appointments with me.      Thank you,  Antolin Starkey DNP, FNP-BC  Endocrinology  [facility]

## 2021-12-10 NOTE — PROGRESS NOTES
Patient presents with:  Diabetes: pt f/u radha ojeda     HPI:   Anthony Perales is a 80year old male who presents for a follow up visit for management of diabetes with his daughter Jennifer Mcclellan. Last A1c value was 9.3% done 11/2/2021.  Pt is unable to check gl postprandial elevation  3. am and overnight episodes of hypoglycemia corrected, no longer present       Wt Readings from Last 3 Encounters:  12/14/21 : 181 lb 9.6 oz (82.4 kg)  11/02/21 : 184 lb (83.5 kg)  10/27/21 : 186 lb (84.4 kg)    BP Readings from [place], (HUMALOG KWIKPEN) 100 UNIT/ML Subcutaneous Solution Pen-injector, Inject 2-10 Units into the skin 3 (three) times daily with meals.  Give 2 unit if blood glucose 141-180 mg/dL Give 3 units if blood glucose 181-220 mg/dL Give 6 units if blood glucose 2 extremities  ENDO: denies polyuria, polyphagia, polydipsia     EXAM:   /52   Pulse 69   Resp 18   Wt 181 lb 9.6 oz (82.4 kg)   SpO2 99%   BMI 25.33 kg/m²  Estimated body mass index is 25.33 kg/m² as calculated from the following:    Height as of 11/2 units   250-300 mg/dl take 3 units   >301 mg/dl take 4 units    · see ophthalmology soon, will schedule  · check feet daily  · check labs as ordered. Lipid order pending per PCP.       As for his hypertension, Blood Pressure is well controlled, no significa

## 2021-12-11 ENCOUNTER — TELEPHONE (OUTPATIENT)
Dept: PODIATRY CLINIC | Facility: CLINIC | Age: 71
End: 2021-12-11

## 2021-12-14 ENCOUNTER — OFFICE VISIT (OUTPATIENT)
Dept: ENDOCRINOLOGY CLINIC | Facility: CLINIC | Age: 71
End: 2021-12-14
Payer: COMMERCIAL

## 2021-12-14 ENCOUNTER — TELEPHONE (OUTPATIENT)
Dept: ENDOCRINOLOGY CLINIC | Facility: CLINIC | Age: 71
End: 2021-12-14

## 2021-12-14 VITALS
BODY MASS INDEX: 25 KG/M2 | SYSTOLIC BLOOD PRESSURE: 108 MMHG | WEIGHT: 181.63 LBS | DIASTOLIC BLOOD PRESSURE: 52 MMHG | HEART RATE: 69 BPM | OXYGEN SATURATION: 99 % | RESPIRATION RATE: 18 BRPM

## 2021-12-14 DIAGNOSIS — E11.65 TYPE 2 DIABETES MELLITUS WITH HYPERGLYCEMIA, WITH LONG-TERM CURRENT USE OF INSULIN (HCC): Primary | ICD-10-CM

## 2021-12-14 DIAGNOSIS — Z79.4 TYPE 2 DIABETES MELLITUS WITH HYPERGLYCEMIA, WITH LONG-TERM CURRENT USE OF INSULIN (HCC): Primary | ICD-10-CM

## 2021-12-14 PROCEDURE — 99214 OFFICE O/P EST MOD 30 MIN: CPT | Performed by: NURSE PRACTITIONER

## 2021-12-14 PROCEDURE — 3078F DIAST BP <80 MM HG: CPT | Performed by: NURSE PRACTITIONER

## 2021-12-14 PROCEDURE — 3074F SYST BP LT 130 MM HG: CPT | Performed by: NURSE PRACTITIONER

## 2021-12-14 NOTE — TELEPHONE ENCOUNTER
Pt's daughter spoke with [employer] and insurance. Elina Kacie will be covered at 100% once approved by insurance but they are waiting on 400+ insurance approvals currently and cannot give them a time line.  Can we send through [facility]? If so then do we cancel th

## 2021-12-14 NOTE — PATIENT INSTRUCTIONS
Summary from visit:   Last A1c value was 9.3% done 11/2/2021. Will check A1C at next visit. If you still haven't been able to obtain the [place] through insurance you can use [facility] and buy 1 month for ~ $125.00.    Diabetes Medications:   Metformin Blood sugar targets:  Before breakfast:   (preferably less than  110)  2 hours After meals: less than 180 (preferably less than 150)   Call for persistent blood sugars less than  75 or more than  200.    Blood sugars greater than 200 are not accepta care, patients receiving routine medications need to be seen at least twice per year however if the A1C is above 8% you will be need to be seen more frequently. · Yearly blood work may also required for many medications to insure safe prescribing.  If you

## 2021-12-15 RX ORDER — GLIMEPIRIDE 1 MG/1
TABLET ORAL
Qty: 90 TABLET | Refills: 0 | Status: SHIPPED | OUTPATIENT
Start: 2021-12-15

## 2021-12-15 NOTE — TELEPHONE ENCOUNTER
Emailed Luís Tyler with [employer] to follow up on this status. If this is the case it will be better to send to another DME.

## 2021-12-15 NOTE — TELEPHONE ENCOUNTER
Requested Prescriptions     Pending Prescriptions Disp Refills   • GLIMEPIRIDE 1 MG Oral Tab [Pharmacy Med Name: GLIMEPIRIDE 1 MG TABLET] 90 tablet 0     Sig: TAKE 1 TABLET BY MOUTH DAILY WITH BREAKFAST.      LOV 12/13/21  FOV 3/29/22  Refill Glimepiride 11

## 2021-12-16 NOTE — TELEPHONE ENCOUNTER
S/W daughter, aware of appt that's needed.  Will book on mychart since she is at [employer]

## 2021-12-21 NOTE — TELEPHONE ENCOUNTER
12/21/21: from Meryle Deep at [address] [place] [address]: Patient is still under the authorization process. We are following up tomorrow to check the status. Just to clarify,  all type 2 [facility] patients require an authorization and [facility] tells us it takes about 14 business days.     Pt sees diabetes provider 3/29

## 2021-12-24 ENCOUNTER — TELEPHONE (OUTPATIENT)
Dept: PODIATRY CLINIC | Facility: CLINIC | Age: 71
End: 2021-12-24

## 2021-12-24 NOTE — TELEPHONE ENCOUNTER
Left voicemail for patient as he has missed his last several clinic follow-up appointments in podiatry office. At last visit his second digit amputation site was well-healed but he had new superficial abrasion to his hallux.   Advised patient to try and fo

## 2021-12-27 RX ORDER — FLASH GLUCOSE SENSOR
1 KIT MISCELLANEOUS
Qty: 2 EACH | Refills: 3 | Status: SHIPPED | OUTPATIENT
Start: 2021-12-27

## 2021-12-27 NOTE — TELEPHONE ENCOUNTER
Received called from daughter Alissa Garcia. Pt is about to end his last sensor on Friday 12/31/21. Daughter is stating [employer] is still in the process of pt applications and it might take a while to received sensors.  Pt daughter says she received emails from Tarun

## 2021-12-27 NOTE — TELEPHONE ENCOUNTER
Requested Prescriptions     Pending Prescriptions Disp Refills   • Continuous Blood Gluc Sensor (FREESTYLE CLAUDE 2 SENSOR) Does not apply Misc 2 each 3     Si each every 14 (fourteen) days. One sensor every 14 days.      LOV 21  FOV 3/29/22  a1c 9

## 2021-12-28 ENCOUNTER — TELEMEDICINE (OUTPATIENT)
Dept: INTERNAL MEDICINE CLINIC | Facility: CLINIC | Age: 71
End: 2021-12-28
Payer: COMMERCIAL

## 2021-12-28 DIAGNOSIS — I25.10 CORONARY ARTERY DISEASE INVOLVING NATIVE HEART WITHOUT ANGINA PECTORIS, UNSPECIFIED VESSEL OR LESION TYPE: ICD-10-CM

## 2021-12-28 DIAGNOSIS — E11.65 TYPE 2 DIABETES MELLITUS WITH HYPERGLYCEMIA, WITH LONG-TERM CURRENT USE OF INSULIN (HCC): Primary | ICD-10-CM

## 2021-12-28 DIAGNOSIS — Z79.4 TYPE 2 DIABETES MELLITUS WITH HYPERGLYCEMIA, WITH LONG-TERM CURRENT USE OF INSULIN (HCC): Primary | ICD-10-CM

## 2021-12-28 DIAGNOSIS — I10 ESSENTIAL HYPERTENSION: ICD-10-CM

## 2021-12-28 PROBLEM — I15.2 HYPERTENSION ASSOCIATED WITH DIABETES  (HCC): Status: ACTIVE | Noted: 2021-12-28

## 2021-12-28 PROBLEM — E78.5 HYPERLIPIDEMIA ASSOCIATED WITH TYPE 2 DIABETES MELLITUS: Status: ACTIVE | Noted: 2021-12-28

## 2021-12-28 PROBLEM — E11.59 HYPERTENSION ASSOCIATED WITH DIABETES  (HCC): Status: ACTIVE | Noted: 2021-12-28

## 2021-12-28 PROBLEM — E11.69 HYPERLIPIDEMIA ASSOCIATED WITH TYPE 2 DIABETES MELLITUS  (HCC): Status: ACTIVE | Noted: 2021-12-28

## 2021-12-28 PROBLEM — I15.2 HYPERTENSION ASSOCIATED WITH DIABETES: Status: ACTIVE | Noted: 2021-12-28

## 2021-12-28 PROBLEM — E11.69 HYPERLIPIDEMIA ASSOCIATED WITH TYPE 2 DIABETES MELLITUS: Status: ACTIVE | Noted: 2021-12-28

## 2021-12-28 PROBLEM — E11.59 HYPERTENSION ASSOCIATED WITH DIABETES (HCC): Status: ACTIVE | Noted: 2021-12-28

## 2021-12-28 PROBLEM — E11.59 HYPERTENSION ASSOCIATED WITH DIABETES: Status: ACTIVE | Noted: 2021-12-28

## 2021-12-28 PROBLEM — E78.5 HYPERLIPIDEMIA: Status: RESOLVED | Noted: 2021-10-27 | Resolved: 2021-12-28

## 2021-12-28 PROBLEM — E78.5 HYPERLIPIDEMIA ASSOCIATED WITH TYPE 2 DIABETES MELLITUS (HCC): Status: ACTIVE | Noted: 2021-12-28

## 2021-12-28 PROBLEM — L97.509 DIABETIC FOOT ULCER (HCC): Status: RESOLVED | Noted: 2021-09-28 | Resolved: 2021-12-28

## 2021-12-28 PROBLEM — E78.5 HYPERLIPIDEMIA ASSOCIATED WITH TYPE 2 DIABETES MELLITUS  (HCC): Status: ACTIVE | Noted: 2021-12-28

## 2021-12-28 PROBLEM — E11.69 HYPERLIPIDEMIA ASSOCIATED WITH TYPE 2 DIABETES MELLITUS (HCC): Status: ACTIVE | Noted: 2021-12-28

## 2021-12-28 PROBLEM — I15.2 HYPERTENSION ASSOCIATED WITH DIABETES (HCC): Status: ACTIVE | Noted: 2021-12-28

## 2021-12-28 PROBLEM — E11.621 DIABETIC FOOT ULCER (HCC): Status: RESOLVED | Noted: 2021-09-28 | Resolved: 2021-12-28

## 2021-12-28 PROCEDURE — 99213 OFFICE O/P EST LOW 20 MIN: CPT | Performed by: INTERNAL MEDICINE

## 2021-12-28 NOTE — PROGRESS NOTES
Subjective:   Patient ID: Sim Cuenca is a 80year old male.   Telehealth outside of [facility] [address] Verbal Consent   I conducted a telehealth visit with Sim Cuenca today, 12/28/21, which was completed using two-way, real-time interactive audio any issues with depression. Pt complains of nothing per daughter who is on the phone as well.     Wt Readings from Last 6 Encounters:  12/14/21 : 181 lb 9.6 oz (82.4 kg)  11/02/21 : 184 lb (83.5 kg)  10/27/21 : 186 lb (84.4 kg)  10/26/21 : 186 lb 3.2 oz (84 VERIO) In Vitro Strip Test blood glucose before meals; signs/symptoms of hypoglycemia 100 strip 2   • OneTouch Delica Lancets Fine Does not apply Misc Test before meals; signs/symptoms of hypoglycemia 100 each 2   • Blood Glucose Monitoring Suppl (ONETOUCH WITH BREAKFAST. 90 tablet 0   • metoprolol succinate 25 MG Oral Tablet 24 Hr Take 1 tablet (25 mg total) by mouth daily. 30 tablet 0   • clopidogrel 75 MG Oral Tab Take 1 tablet (75 mg total) by mouth daily.  90 tablet 1   • lisinopril 20 MG Oral Tab Take 1

## 2021-12-30 ENCOUNTER — OFFICE VISIT (OUTPATIENT)
Dept: PODIATRY CLINIC | Facility: CLINIC | Age: 71
End: 2021-12-30
Payer: COMMERCIAL

## 2021-12-30 DIAGNOSIS — L60.1 ONYCHOLYSIS: Primary | ICD-10-CM

## 2021-12-30 DIAGNOSIS — Z89.422 HISTORY OF AMPUTATION OF LESSER TOE, LEFT (HCC): ICD-10-CM

## 2021-12-30 DIAGNOSIS — I73.9 PAD (PERIPHERAL ARTERY DISEASE) (HCC): ICD-10-CM

## 2021-12-30 DIAGNOSIS — B35.1 ONYCHOMYCOSIS: ICD-10-CM

## 2021-12-30 DIAGNOSIS — E11.42 DIABETIC POLYNEUROPATHY ASSOCIATED WITH TYPE 2 DIABETES MELLITUS (HCC): ICD-10-CM

## 2021-12-30 PROCEDURE — 99213 OFFICE O/P EST LOW 20 MIN: CPT | Performed by: STUDENT IN AN ORGANIZED HEALTH CARE EDUCATION/TRAINING PROGRAM

## 2021-12-30 RX ORDER — AMOXICILLIN AND CLAVULANATE POTASSIUM 875; 125 MG/1; MG/1
1 TABLET, FILM COATED ORAL 2 TIMES DAILY
Qty: 14 TABLET | Refills: 0 | Status: SHIPPED | OUTPATIENT
Start: 2021-12-30

## 2021-12-30 NOTE — PROGRESS NOTES
[facility], [facility] Podiatry  Progress Note    Anthony Perales is a 80year old male. Patient presents with:  Diabetic Foot Care: Diabetic foot care. A1C 9.3 as 11/2/21 FBG 92 this morning. Pain second toe right foot. Pain 2/10 right now.          HPI:     Isaias Villanueva total) by mouth nightly. 90 tablet 1   • sertraline 100 MG Oral Tab Take 1 tablet (100 mg total) by mouth daily. 90 tablet 1   • gabapentin 300 MG Oral Cap Take 1 capsule (300 mg total) by mouth nightly.  90 capsule 1   • metFORMIN 500 MG Oral Tab Take 1 ta thickened, dystrophic, small debris x9. Right second digit nail is loosely adhered.   No purulence, erythema, or other concerns noted.     Vascular: Dorsalis pedis and posterior tibial pulses are are lightly palpable bilaterally.  Feet are warm bilaterally arise.  Given patient's history of toe amputation, I will prescribe Augmentin for the next week.  -He should dress site with Betadine and Band-Aid daily.  -Discussed with patient and daughter that they should obtain diabetic shoes as prescribed at last vis

## 2021-12-30 NOTE — PATIENT INSTRUCTIONS
-Dress right second digit wound with Betadine and Band-Aid daily.  -Take Augmentin as prescribed. -Seek immediate medical attention if any acute signs of infection or other concerns arise.

## 2022-01-05 ENCOUNTER — OFFICE VISIT (OUTPATIENT)
Dept: PODIATRY CLINIC | Facility: CLINIC | Age: 72
End: 2022-01-05
Payer: COMMERCIAL

## 2022-01-05 DIAGNOSIS — E11.42 DIABETIC POLYNEUROPATHY ASSOCIATED WITH TYPE 2 DIABETES MELLITUS (HCC): ICD-10-CM

## 2022-01-05 DIAGNOSIS — L60.1 ONYCHOLYSIS: ICD-10-CM

## 2022-01-05 DIAGNOSIS — I73.9 PAD (PERIPHERAL ARTERY DISEASE) (HCC): ICD-10-CM

## 2022-01-05 DIAGNOSIS — Z89.422 HISTORY OF AMPUTATION OF LESSER TOE, LEFT (HCC): Primary | ICD-10-CM

## 2022-01-07 ENCOUNTER — MED REC SCAN ONLY (OUTPATIENT)
Dept: INTERNAL MEDICINE CLINIC | Facility: CLINIC | Age: 72
End: 2022-01-07

## 2022-01-10 NOTE — PROGRESS NOTES
[facility], [facility] Podiatry  Progress Note    Trever Brunson is a 80year old male. Patient presents with: Follow - Up: right foot 2nd toe follow up. denies pain.         HPI:     Patient is a pleasant 73-year-old male with history of diabetes and peripheral • OneTouch Delica Lancets Fine Does not apply Misc Test before meals; signs/symptoms of hypoglycemia 100 each 2   • Blood Glucose Monitoring Suppl ([address]) w/Device Does not apply Kit Test before meals and for signs/symptoms of hypogl extremities.     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 Muscle strength testing deferred. No pain on palpation noted to lower extremities. Flexion contracture of digits present bilaterally.       ASSESSMENT AND PLAN:   Diagnoses and all orders for

## 2022-01-10 NOTE — PATIENT INSTRUCTIONS
-Site of traumatic nail avulsion is well-healed without acute signs of infection.  -We can gradually space out visits and begin seeing you for routine foot care.  -Discussed importance of proper pedal hygiene, regular foot checks, and tight glucose control

## 2022-02-04 NOTE — TELEPHONE ENCOUNTER
Spoke with patient's daughter Mitzy Manriquez. Verified patient's name and . They have not heard anything about the status of the patients Dana through [address]. She is okay with us sending the request to another DME.  Referral sent to [facility]MICAL DEPENDENCY REC[facility] given patient's Antolin Madison

## 2022-02-11 ENCOUNTER — TELEPHONE (OUTPATIENT)
Dept: ENDOCRINOLOGY CLINIC | Facility: CLINIC | Age: 72
End: 2022-02-11

## 2022-02-11 NOTE — TELEPHONE ENCOUNTER
Received notification from [employer] that Mederos 2 sensors are ready to be shipped. Pt should receive in 3-5 days.

## 2022-02-17 ENCOUNTER — TELEPHONE (OUTPATIENT)
Dept: PODIATRY CLINIC | Facility: CLINIC | Age: 72
End: 2022-02-17

## 2022-02-17 ENCOUNTER — PATIENT MESSAGE (OUTPATIENT)
Dept: PODIATRY CLINIC | Facility: CLINIC | Age: 72
End: 2022-02-17

## 2022-02-17 NOTE — TELEPHONE ENCOUNTER
From: Yazmin Meyers  To: Aldo Julian DPM  Sent: 2/17/2022 9:11 AM CST  Subject: Diabetic shoes     Good morning Dr. Georgette Yap,    We received a call to go get fitted for the diabetic shoes, but I cannot recall to which facility it was. Is there anyway this can be reprinted or faxed directly to the facility?      Thank you

## 2022-02-17 NOTE — TELEPHONE ENCOUNTER
Patients daughter states patient was given an order for diabetic shoes and that office called but she is unsure of the place name nor the fax number.  Please advise

## 2022-02-21 RX ORDER — GLIMEPIRIDE 1 MG/1
TABLET ORAL
Qty: 90 TABLET | Refills: 0 | Status: SHIPPED | OUTPATIENT
Start: 2022-02-21

## 2022-03-31 ENCOUNTER — OFFICE VISIT (OUTPATIENT)
Dept: PODIATRY CLINIC | Facility: CLINIC | Age: 72
End: 2022-03-31
Payer: COMMERCIAL

## 2022-03-31 DIAGNOSIS — Z89.422 HISTORY OF AMPUTATION OF LESSER TOE, LEFT (HCC): ICD-10-CM

## 2022-03-31 DIAGNOSIS — B35.1 ONYCHOMYCOSIS: Primary | ICD-10-CM

## 2022-03-31 DIAGNOSIS — I73.9 PAD (PERIPHERAL ARTERY DISEASE) (HCC): ICD-10-CM

## 2022-03-31 DIAGNOSIS — E11.42 DIABETIC POLYNEUROPATHY ASSOCIATED WITH TYPE 2 DIABETES MELLITUS (HCC): ICD-10-CM

## 2022-03-31 DIAGNOSIS — L84 FOOT CALLUS: ICD-10-CM

## 2022-03-31 PROCEDURE — 99213 OFFICE O/P EST LOW 20 MIN: CPT | Performed by: STUDENT IN AN ORGANIZED HEALTH CARE EDUCATION/TRAINING PROGRAM

## 2022-04-26 ENCOUNTER — OFFICE VISIT (OUTPATIENT)
Dept: ENDOCRINOLOGY CLINIC | Facility: CLINIC | Age: 72
End: 2022-04-26
Payer: COMMERCIAL

## 2022-04-26 VITALS
DIASTOLIC BLOOD PRESSURE: 70 MMHG | RESPIRATION RATE: 16 BRPM | HEART RATE: 56 BPM | WEIGHT: 159 LBS | HEIGHT: 71 IN | BODY MASS INDEX: 22.26 KG/M2 | OXYGEN SATURATION: 98 % | SYSTOLIC BLOOD PRESSURE: 118 MMHG | TEMPERATURE: 97 F

## 2022-04-26 DIAGNOSIS — E11.65 TYPE 2 DIABETES MELLITUS WITH HYPERGLYCEMIA, WITH LONG-TERM CURRENT USE OF INSULIN (HCC): Primary | ICD-10-CM

## 2022-04-26 DIAGNOSIS — Z79.4 TYPE 2 DIABETES MELLITUS WITH HYPERGLYCEMIA, WITH LONG-TERM CURRENT USE OF INSULIN (HCC): Primary | ICD-10-CM

## 2022-04-26 LAB
CARTRIDGE LOT#: 918 NUMERIC
HEMOGLOBIN A1C: 6.2 % (ref 4.3–5.6)

## 2022-04-26 PROCEDURE — 3074F SYST BP LT 130 MM HG: CPT | Performed by: NURSE PRACTITIONER

## 2022-04-26 PROCEDURE — 99213 OFFICE O/P EST LOW 20 MIN: CPT | Performed by: NURSE PRACTITIONER

## 2022-04-26 PROCEDURE — 95251 CONT GLUC MNTR ANALYSIS I&R: CPT | Performed by: NURSE PRACTITIONER

## 2022-04-26 PROCEDURE — 3078F DIAST BP <80 MM HG: CPT | Performed by: NURSE PRACTITIONER

## 2022-04-26 PROCEDURE — 83036 HEMOGLOBIN GLYCOSYLATED A1C: CPT | Performed by: NURSE PRACTITIONER

## 2022-04-26 PROCEDURE — 3008F BODY MASS INDEX DOCD: CPT | Performed by: NURSE PRACTITIONER

## 2022-05-02 ENCOUNTER — TELEPHONE (OUTPATIENT)
Dept: ENDOCRINOLOGY CLINIC | Facility: CLINIC | Age: 72
End: 2022-05-02

## 2022-05-12 ENCOUNTER — PATIENT OUTREACH (OUTPATIENT)
Dept: CASE MANAGEMENT | Age: 72
End: 2022-05-12

## 2022-05-12 RX ORDER — LISINOPRIL 20 MG/1
TABLET ORAL
Qty: 90 TABLET | Refills: 0 | Status: SHIPPED | OUTPATIENT
Start: 2022-05-12

## 2022-05-12 NOTE — PROGRESS NOTES
05/12/22      CCM introduction call, Pt's daughter could not talk on the phone for long. Will CB later.

## 2022-05-12 NOTE — TELEPHONE ENCOUNTER
Last time medication was refilled 10/27/21   Quantity and # of refills 90 tabs w/1 refill   Last OV  telemed visit 12/28/21   Next OV  None

## 2022-06-08 ENCOUNTER — APPOINTMENT (OUTPATIENT)
Dept: GENERAL RADIOLOGY | Facility: HOSPITAL | Age: 72
End: 2022-06-08
Attending: EMERGENCY MEDICINE
Payer: MEDICARE

## 2022-06-08 ENCOUNTER — OFFICE VISIT (OUTPATIENT)
Dept: PODIATRY CLINIC | Facility: CLINIC | Age: 72
End: 2022-06-08
Payer: COMMERCIAL

## 2022-06-08 ENCOUNTER — HOSPITAL ENCOUNTER (INPATIENT)
Facility: HOSPITAL | Age: 72
LOS: 5 days | Discharge: HOME HEALTH | End: 2022-06-13
Attending: EMERGENCY MEDICINE | Admitting: HOSPITALIST
Payer: MEDICARE

## 2022-06-08 DIAGNOSIS — B35.1 ONYCHOMYCOSIS: ICD-10-CM

## 2022-06-08 DIAGNOSIS — L97.519 ULCER OF RIGHT FOOT, UNSPECIFIED ULCER STAGE (HCC): Primary | ICD-10-CM

## 2022-06-08 DIAGNOSIS — E11.42 DIABETIC POLYNEUROPATHY ASSOCIATED WITH TYPE 2 DIABETES MELLITUS (HCC): ICD-10-CM

## 2022-06-08 DIAGNOSIS — L97.514 FOOT ULCER, RIGHT, WITH NECROSIS OF BONE (HCC): Primary | ICD-10-CM

## 2022-06-08 DIAGNOSIS — Z89.422 HISTORY OF AMPUTATION OF LESSER TOE, LEFT (HCC): ICD-10-CM

## 2022-06-08 DIAGNOSIS — I73.9 PAD (PERIPHERAL ARTERY DISEASE) (HCC): ICD-10-CM

## 2022-06-08 PROBLEM — E87.1 HYPONATREMIA: Status: ACTIVE | Noted: 2022-06-08

## 2022-06-08 PROBLEM — D64.9 ANEMIA: Status: ACTIVE | Noted: 2022-06-08

## 2022-06-08 PROBLEM — R79.89 AZOTEMIA: Status: ACTIVE | Noted: 2022-06-08

## 2022-06-08 PROBLEM — R73.9 HYPERGLYCEMIA: Status: ACTIVE | Noted: 2022-06-08

## 2022-06-08 LAB
ALBUMIN SERPL-MCNC: 3.8 G/DL (ref 3.4–5)
ALBUMIN/GLOB SERPL: 1 {RATIO} (ref 1–2)
ALP LIVER SERPL-CCNC: 113 U/L
ALT SERPL-CCNC: 34 U/L
ANION GAP SERPL CALC-SCNC: 3 MMOL/L (ref 0–18)
AST SERPL-CCNC: 23 U/L (ref 15–37)
BASOPHILS # BLD AUTO: 0.03 X10(3) UL (ref 0–0.2)
BASOPHILS NFR BLD AUTO: 0.5 %
BILIRUB SERPL-MCNC: 0.2 MG/DL (ref 0.1–2)
BUN BLD-MCNC: 30 MG/DL (ref 7–18)
CALCIUM BLD-MCNC: 9.2 MG/DL (ref 8.5–10.1)
CHLORIDE SERPL-SCNC: 105 MMOL/L (ref 98–112)
CO2 SERPL-SCNC: 25 MMOL/L (ref 21–32)
CREAT BLD-MCNC: 0.98 MG/DL
EOSINOPHIL # BLD AUTO: 0.13 X10(3) UL (ref 0–0.7)
EOSINOPHIL NFR BLD AUTO: 2.2 %
ERYTHROCYTE [DISTWIDTH] IN BLOOD BY AUTOMATED COUNT: 13.4 %
GLOBULIN PLAS-MCNC: 3.9 G/DL (ref 2.8–4.4)
GLUCOSE BLD-MCNC: 117 MG/DL (ref 70–99)
GLUCOSE BLD-MCNC: 180 MG/DL (ref 70–99)
GLUCOSE BLD-MCNC: 203 MG/DL (ref 70–99)
HCT VFR BLD AUTO: 33.5 %
HGB BLD-MCNC: 10.9 G/DL
IMM GRANULOCYTES # BLD AUTO: 0.01 X10(3) UL (ref 0–1)
IMM GRANULOCYTES NFR BLD: 0.2 %
LYMPHOCYTES # BLD AUTO: 0.72 X10(3) UL (ref 1–4)
LYMPHOCYTES NFR BLD AUTO: 12 %
MCH RBC QN AUTO: 31 PG (ref 26–34)
MCHC RBC AUTO-ENTMCNC: 32.5 G/DL (ref 31–37)
MCV RBC AUTO: 95.2 FL
MONOCYTES # BLD AUTO: 0.61 X10(3) UL (ref 0.1–1)
MONOCYTES NFR BLD AUTO: 10.2 %
NEUTROPHILS # BLD AUTO: 4.49 X10 (3) UL (ref 1.5–7.7)
NEUTROPHILS # BLD AUTO: 4.49 X10(3) UL (ref 1.5–7.7)
NEUTROPHILS NFR BLD AUTO: 74.9 %
OSMOLALITY SERPL CALC.SUM OF ELEC: 283 MOSM/KG (ref 275–295)
PLATELET # BLD AUTO: 202 10(3)UL (ref 150–450)
POTASSIUM SERPL-SCNC: 4.9 MMOL/L (ref 3.5–5.1)
PROT SERPL-MCNC: 7.7 G/DL (ref 6.4–8.2)
RBC # BLD AUTO: 3.52 X10(6)UL
SARS-COV-2 RNA RESP QL NAA+PROBE: NOT DETECTED
SODIUM SERPL-SCNC: 133 MMOL/L (ref 136–145)
WBC # BLD AUTO: 6 X10(3) UL (ref 4–11)

## 2022-06-08 PROCEDURE — 73630 X-RAY EXAM OF FOOT: CPT | Performed by: EMERGENCY MEDICINE

## 2022-06-08 PROCEDURE — 99223 1ST HOSP IP/OBS HIGH 75: CPT | Performed by: HOSPITALIST

## 2022-06-08 PROCEDURE — 99213 OFFICE O/P EST LOW 20 MIN: CPT | Performed by: STUDENT IN AN ORGANIZED HEALTH CARE EDUCATION/TRAINING PROGRAM

## 2022-06-08 PROCEDURE — 1125F AMNT PAIN NOTED PAIN PRSNT: CPT | Performed by: STUDENT IN AN ORGANIZED HEALTH CARE EDUCATION/TRAINING PROGRAM

## 2022-06-08 RX ORDER — NICOTINE POLACRILEX 4 MG
30 LOZENGE BUCCAL
Status: DISCONTINUED | OUTPATIENT
Start: 2022-06-08 | End: 2022-06-13

## 2022-06-08 RX ORDER — MELATONIN
3 NIGHTLY PRN
Status: DISCONTINUED | OUTPATIENT
Start: 2022-06-08 | End: 2022-06-13

## 2022-06-08 RX ORDER — LISINOPRIL 20 MG/1
20 TABLET ORAL DAILY
COMMUNITY

## 2022-06-08 RX ORDER — ONDANSETRON 2 MG/ML
4 INJECTION INTRAMUSCULAR; INTRAVENOUS EVERY 6 HOURS PRN
Status: DISCONTINUED | OUTPATIENT
Start: 2022-06-08 | End: 2022-06-13

## 2022-06-08 RX ORDER — CLOPIDOGREL BISULFATE 75 MG/1
75 TABLET ORAL DAILY
Status: DISCONTINUED | OUTPATIENT
Start: 2022-06-09 | End: 2022-06-13

## 2022-06-08 RX ORDER — GLIMEPIRIDE 1 MG/1
1 TABLET ORAL
COMMUNITY

## 2022-06-08 RX ORDER — LISINOPRIL 20 MG/1
20 TABLET ORAL DAILY
Status: DISCONTINUED | OUTPATIENT
Start: 2022-06-09 | End: 2022-06-13

## 2022-06-08 RX ORDER — ENOXAPARIN SODIUM 100 MG/ML
40 INJECTION SUBCUTANEOUS NIGHTLY
Status: DISCONTINUED | OUTPATIENT
Start: 2022-06-08 | End: 2022-06-13

## 2022-06-08 RX ORDER — ROSUVASTATIN CALCIUM 40 MG/1
40 TABLET, COATED ORAL NIGHTLY
Status: DISCONTINUED | OUTPATIENT
Start: 2022-06-08 | End: 2022-06-13

## 2022-06-08 RX ORDER — GABAPENTIN 300 MG/1
300 CAPSULE ORAL NIGHTLY
Status: DISCONTINUED | OUTPATIENT
Start: 2022-06-08 | End: 2022-06-13

## 2022-06-08 RX ORDER — METOCLOPRAMIDE HYDROCHLORIDE 5 MG/ML
10 INJECTION INTRAMUSCULAR; INTRAVENOUS EVERY 8 HOURS PRN
Status: DISCONTINUED | OUTPATIENT
Start: 2022-06-08 | End: 2022-06-13

## 2022-06-08 RX ORDER — DEXTROSE MONOHYDRATE 25 G/50ML
50 INJECTION, SOLUTION INTRAVENOUS
Status: DISCONTINUED | OUTPATIENT
Start: 2022-06-08 | End: 2022-06-13

## 2022-06-08 RX ORDER — NICOTINE POLACRILEX 4 MG
15 LOZENGE BUCCAL
Status: DISCONTINUED | OUTPATIENT
Start: 2022-06-08 | End: 2022-06-13

## 2022-06-08 RX ORDER — SERTRALINE HYDROCHLORIDE 100 MG/1
100 TABLET, FILM COATED ORAL DAILY
Status: DISCONTINUED | OUTPATIENT
Start: 2022-06-09 | End: 2022-06-13

## 2022-06-08 NOTE — ED INITIAL ASSESSMENT (HPI)
Pt was seen by pediatry for ulcer to bottom of right foot. Pt sent here to ensure no infection. No fevers at home.

## 2022-06-08 NOTE — PLAN OF CARE
NURSING ADMISSION NOTE    Patient admitted from ER. Oriented to room. Safety precautions in place. Call light within reach.

## 2022-06-08 NOTE — ED QUICK NOTES
Orders for admission, patient is aware of plan and ready to go upstairs.  Any questions, please call ED RN  at extension [phone]    Patient Covid vaccination status: Fully vaccinated     COVID Test Ordered in ED: Rapid SARS-CoV-2 by PCR    COVID Suspicion at Admission: Low clinical suspicion for COVID    Running Infusions:      Mental Status/LOC at time of transport: Alert and oriented     Other pertinent information:   CIWA score: N/A   NIH score:  N/A

## 2022-06-08 NOTE — PATIENT INSTRUCTIONS
- Patient sent to ED for admission given fluctuant wound to plantar right fifth metatarsal head that probes to bone.   -There is a good chance patient will require fifth metatarsal head resection to site.  -Patient would benefit from MRI of right foot, infectious disease consultation, and vascular evaluation with Dr. Dina Lomas who he has seen in the past.

## 2022-06-09 ENCOUNTER — APPOINTMENT (OUTPATIENT)
Dept: MRI IMAGING | Facility: HOSPITAL | Age: 72
End: 2022-06-09
Attending: HOSPITALIST
Payer: MEDICARE

## 2022-06-09 LAB
GLUCOSE BLD-MCNC: 127 MG/DL (ref 70–99)
GLUCOSE BLD-MCNC: 156 MG/DL (ref 70–99)
GLUCOSE BLD-MCNC: 167 MG/DL (ref 70–99)
GLUCOSE BLD-MCNC: 195 MG/DL (ref 70–99)

## 2022-06-09 PROCEDURE — 99232 SBSQ HOSP IP/OBS MODERATE 35: CPT | Performed by: HOSPITALIST

## 2022-06-09 PROCEDURE — 99223 1ST HOSP IP/OBS HIGH 75: CPT | Performed by: STUDENT IN AN ORGANIZED HEALTH CARE EDUCATION/TRAINING PROGRAM

## 2022-06-09 PROCEDURE — 73720 MRI LWR EXTREMITY W/O&W/DYE: CPT | Performed by: HOSPITALIST

## 2022-06-09 NOTE — PLAN OF CARE
Problem: Patient/Family Goals  Goal: Patient/Family Long Term Goal  Description: Patient's Long Term Goal: Discharge home    Interventions:  - MRI  -Ambulate TID  - See additional Care Plan goals for specific interventions  Outcome: Progressing  Goal: Patient/Family Short Term Goal  Description: Patient's Short Term Goal: Comfort    Interventions:   - Pain meds as needed  - See additional Care Plan goals for specific interventions  Outcome: Progressing     Problem: PAIN - ADULT  Goal: Verbalizes/displays adequate comfort level or patient's stated pain goal  Description: INTERVENTIONS:  - Encourage pt to monitor pain and request assistance  - Assess pain using appropriate pain scale  - Administer analgesics based on type and severity of pain and evaluate response  - Implement non-pharmacological measures as appropriate and evaluate response  - Consider cultural and social influences on pain and pain management  - Manage/alleviate anxiety  - Utilize distraction and/or relaxation techniques  - Monitor for opioid side effects  - Notify MD/LIP if interventions unsuccessful or patient reports new pain  - Anticipate increased pain with activity and pre-medicate as appropriate  Outcome: Progressing     Problem: SAFETY ADULT - FALL  Goal: Free from fall injury  Description: INTERVENTIONS:  - Assess pt frequently for physical needs  - Identify cognitive and physical deficits and behaviors that affect risk of falls.   - English fall precautions as indicated by assessment.  - Educate pt/family on patient safety including physical limitations  - Instruct pt to call for assistance with activity based on assessment  - Modify environment to reduce risk of injury  - Provide assistive devices as appropriate  - Consider OT/PT consult to assist with strengthening/mobility  - Encourage toileting schedule  Outcome: Progressing     Problem: DISCHARGE PLANNING  Goal: Discharge to home or other facility with appropriate resources  Description: INTERVENTIONS:  - Identify barriers to discharge w/pt and caregiver  - Include patient/family/discharge partner in discharge planning  - Arrange for needed discharge resources and transportation as appropriate  - Identify discharge learning needs (meds, wound care, etc)  - Arrange for interpreters to assist at discharge as needed  - Consider post-discharge preferences of patient/family/discharge partner  - Complete POLST form as appropriate  - Assess patient's ability to be responsible for managing their own health  - Refer to Case Management Department for coordinating discharge planning if the patient needs post-hospital services based on physician/LIP order or complex needs related to functional status, cognitive ability or social support system  Outcome: Progressing     A&Ox4. VSS. RA.   GI: Abdomen soft, nondistended. : Voids. Pain controlled with PRN pain medications  Up with standby assist.  Ranjith Chacon changed by MD this am, dressing to R foot CDI. Diet:tolerating 1800 ADA diet. IVF running per order. All appropriate safety measures in place. All questions and concerns addressed.  Will continue to monitor

## 2022-06-09 NOTE — PLAN OF CARE
Assumed care of pt at 12am. A&O x4. VSS on RA. . Denies pain. Ambulating with min assist with walker. Voids freely per urinal. Rt foot dressing C/D/I. MRI and arterial doppler pending. Reviewed POC with pt who agrees. Pt reminded to use call light. Verbalized understanding.

## 2022-06-09 NOTE — CM/SW NOTE
06/09/22 1600   CM/SW Referral Data   Referral Source Social Work (self-referral)   Reason for Referral Discharge planning   Informant Patient;Daughter;EMR;Clinical Staff Member   Patient Info   Patient's Current Mental Status at Time of Assessment Alert;Oriented   Patient's Home Environment [facility]   Number of Levels in Home 2  (patient stays on main level)   Patient lives with Daughter   Patient Status Prior to Admission   Independent with ADLs and Mobility Yes   Services in place prior to admission DME/Supplies at home   Type of DME/Supplies [facility]; Wheelchair   Discharge Needs   Anticipated D/C needs Infusion care;Home health care       Patient is a 81 y/o man admitted with foot ulcer. Spoke with Nhung from ID who stated pt is anticipated to need 6 weeks of IV abx. Met with pt and pt's dtr, Ricarda at bedside. Pt lives with his dtr and her friend in a 2 level town home. Pt stays on the main level. He normally ambulates without device, but does have walker and wheelchair. Pt's dtr is a teacher and is currently home for the summer. She does start part time summer school on 7/5. Pt has no previous history of  or NH rehab. Discussed DC planning and anticipated need for IV abx. Reviewed options for infusion/IV abx: 1) nursing home, 2) home with home health care 3) home with in office teach and train vs 4) outpt infusion at MD office or infusion center. Discussed that home infusion may include out of pocket costs and/or require patient to be homebound and also that infusion center can only accommodate once daily infusion. Pt/dtr stated preference for home infusion and pt's dtr confirmed she is available to manage IV abx at home. They expressed concern about potential costs for medications/supplies. Discussed plan for referrals to infusion providers to determine any potential out of pocket costs. Pt/dtr in agreement with plan.     [facility] agencies by Zenon Gambino. Await responses and final cultures/scripts for IV abx needed at DC. / to remain available for support and/or discharge planning.      Ganga Lazo, [facility]  Discharge Planner  [phone]

## 2022-06-09 NOTE — CM/SW NOTE
Department  notified of request for [address] and Infusion , aidin referrals started. Assigned CM/SW to follow up with pt/family on further discharge planning.      Judie Jeffers  [facility]

## 2022-06-10 ENCOUNTER — APPOINTMENT (OUTPATIENT)
Dept: ULTRASOUND IMAGING | Facility: HOSPITAL | Age: 72
End: 2022-06-10
Attending: HOSPITALIST
Payer: MEDICARE

## 2022-06-10 LAB
GLUCOSE BLD-MCNC: 169 MG/DL (ref 70–99)
GLUCOSE BLD-MCNC: 181 MG/DL (ref 70–99)
GLUCOSE BLD-MCNC: 195 MG/DL (ref 70–99)
GLUCOSE BLD-MCNC: 254 MG/DL (ref 70–99)

## 2022-06-10 PROCEDURE — 99231 SBSQ HOSP IP/OBS SF/LOW 25: CPT | Performed by: STUDENT IN AN ORGANIZED HEALTH CARE EDUCATION/TRAINING PROGRAM

## 2022-06-10 PROCEDURE — 99232 SBSQ HOSP IP/OBS MODERATE 35: CPT | Performed by: HOSPITALIST

## 2022-06-10 PROCEDURE — 93926 LOWER EXTREMITY STUDY: CPT | Performed by: HOSPITALIST

## 2022-06-10 PROCEDURE — 05HY33Z INSERTION OF INFUSION DEVICE INTO UPPER VEIN, PERCUTANEOUS APPROACH: ICD-10-PCS | Performed by: PHYSICIAN ASSISTANT

## 2022-06-10 RX ORDER — LIDOCAINE HYDROCHLORIDE 10 MG/ML
5 INJECTION, SOLUTION EPIDURAL; INFILTRATION; INTRACAUDAL; PERINEURAL ONCE
Status: COMPLETED | OUTPATIENT
Start: 2022-06-10 | End: 2022-06-10

## 2022-06-10 RX ORDER — ERTAPENEM 1 G/1
1 INJECTION, POWDER, LYOPHILIZED, FOR SOLUTION INTRAMUSCULAR; INTRAVENOUS DAILY
Qty: 42 EACH | Refills: 0 | Status: SHIPPED | OUTPATIENT
Start: 2022-06-10 | End: 2022-07-22

## 2022-06-10 NOTE — CM/SW NOTE
Informed by Anne-Marie Chapman with [facility] that pt can be accepted and would have out of pocket costs. Spoke with Aliya Funes from [facility] who stated pt can be accepted and they would be able to provide home infusion with one of their contract [facility] providers at no cost to patient. [facility]  has accepted patient. Met with pt who confirmed his preference for [facility] and [facility]  in order to avoid out of pocket costs. Pt requesting bedside commode for home. [facility] and [facility]  reserved in [address]. Await final orders and PICC Placement. Order for commode entered and MD page via perfect serve to request co-sign. Message left for E liaison regarding new referral.  / to remain available for support and/or discharge planning.      Juanis Santoyo, [facility]  Discharge Planner  [phone]

## 2022-06-10 NOTE — PLAN OF CARE
Dressing clean, dry, intact. picc line placed. Awaiting post op shoe to be delivered from central distribution. Vascular surgeon states will see patient tomorrow. Instructed on plan of care, call don't fall protocol, call for all asst needed, discomfort felt. Verbalized understanding. 1800:  Post op shoe at bedside. Declined insulin today, physician aware.

## 2022-06-10 NOTE — CM/SW NOTE
Spoke with Tere Camarena from [address] [place] [address] regarding referral for commode. She will verify insurance coverage and contact pt/family for delivery. / to remain available for support and/or discharge planning.      Flory Avelar, [facility]  Discharge Planner  [phone]

## 2022-06-11 LAB
GLUCOSE BLD-MCNC: 131 MG/DL (ref 70–99)
GLUCOSE BLD-MCNC: 155 MG/DL (ref 70–99)
GLUCOSE BLD-MCNC: 207 MG/DL (ref 70–99)
GLUCOSE BLD-MCNC: 234 MG/DL (ref 70–99)

## 2022-06-11 PROCEDURE — 99233 SBSQ HOSP IP/OBS HIGH 50: CPT | Performed by: HOSPITALIST

## 2022-06-11 PROCEDURE — 99231 SBSQ HOSP IP/OBS SF/LOW 25: CPT | Performed by: STUDENT IN AN ORGANIZED HEALTH CARE EDUCATION/TRAINING PROGRAM

## 2022-06-11 NOTE — PLAN OF CARE
Pt denies any pain or discomfort. VSS, afebrile, on Unasyn IV. Rt foot gauze/kerlix dsg cdi. Ambulating independently after set-up, compliant in using post-op shoe when up, aware to call when assistance needed. Vascular to see pt ramón. Plan is dc home with IV abx infusion.

## 2022-06-11 NOTE — PLAN OF CARE
Reports pain controlled. Dressing to right foot clean and dry, changed by podiatry. Vitals stable on room air. Voiding without difficulty. Tolerating carb controlled diet. Up with standby assist, wearing post-op shoe as ordered. Plan for angiogram Monday, plan of care discussed with patient who agrees.

## 2022-06-11 NOTE — PLAN OF CARE
Dressing to right foot changed, sterile gauze and kerlex placed. Wearing post op shoe. Picc line in place. Instructed on plan of care, call for all asst needed, discomfort felt. Verbalized understanding.

## 2022-06-12 LAB
GLUCOSE BLD-MCNC: 156 MG/DL (ref 70–99)
GLUCOSE BLD-MCNC: 171 MG/DL (ref 70–99)
GLUCOSE BLD-MCNC: 199 MG/DL (ref 70–99)
GLUCOSE BLD-MCNC: 202 MG/DL (ref 70–99)
SARS-COV-2 RNA RESP QL NAA+PROBE: NOT DETECTED

## 2022-06-12 PROCEDURE — 99232 SBSQ HOSP IP/OBS MODERATE 35: CPT | Performed by: HOSPITALIST

## 2022-06-12 NOTE — PLAN OF CARE
Pt denies pain or discomfort to rt foot. Dsg cdi, ambulating using post-op shoe to both feet. PP palpable, no redness or swelling noted. Plan for Angiogram on Monday.

## 2022-06-12 NOTE — PLAN OF CARE
Reports pain controlled. Dressing to right foot clean and dry. Vitals stable on room air. Voiding without difficulty. Tolerating carb controlled diet. Up with standby assist, wearing post-op shoe as ordered. Plan for angiogram Monday, plan of care discussed with patient who agrees. Per Dr Donn Avilez, do not need to hold Lovenox prior to angiogram. Operative consent signed by patient, placed on chart. Right foot dressing changed with betadine and dry gauze dressing/kerlix/ace.

## 2022-06-13 ENCOUNTER — APPOINTMENT (OUTPATIENT)
Dept: INTERVENTIONAL RADIOLOGY/VASCULAR | Facility: HOSPITAL | Age: 72
End: 2022-06-13
Attending: SURGERY
Payer: MEDICARE

## 2022-06-13 VITALS
DIASTOLIC BLOOD PRESSURE: 56 MMHG | SYSTOLIC BLOOD PRESSURE: 135 MMHG | BODY MASS INDEX: 22.4 KG/M2 | RESPIRATION RATE: 14 BRPM | HEIGHT: 71 IN | HEART RATE: 52 BPM | WEIGHT: 160 LBS | TEMPERATURE: 98 F | OXYGEN SATURATION: 100 %

## 2022-06-13 LAB
GLUCOSE BLD-MCNC: 167 MG/DL (ref 70–99)
GLUCOSE BLD-MCNC: 205 MG/DL (ref 70–99)
GLUCOSE BLD-MCNC: 292 MG/DL (ref 70–99)
PLATELET # BLD AUTO: 220 10(3)UL (ref 150–450)

## 2022-06-13 PROCEDURE — B41F1ZZ FLUOROSCOPY OF RIGHT LOWER EXTREMITY ARTERIES USING LOW OSMOLAR CONTRAST: ICD-10-PCS | Performed by: SURGERY

## 2022-06-13 PROCEDURE — 047P3ZZ DILATION OF RIGHT ANTERIOR TIBIAL ARTERY, PERCUTANEOUS APPROACH: ICD-10-PCS | Performed by: SURGERY

## 2022-06-13 PROCEDURE — 0YHB33Z INSERTION OF INFUSION DEVICE INTO LEFT LOWER EXTREMITY, PERCUTANEOUS APPROACH: ICD-10-PCS | Performed by: SURGERY

## 2022-06-13 PROCEDURE — 99239 HOSP IP/OBS DSCHRG MGMT >30: CPT | Performed by: HOSPITALIST

## 2022-06-13 PROCEDURE — B41C1ZZ FLUOROSCOPY OF PELVIC ARTERIES USING LOW OSMOLAR CONTRAST: ICD-10-PCS | Performed by: SURGERY

## 2022-06-13 PROCEDURE — 047R3ZZ DILATION OF RIGHT POSTERIOR TIBIAL ARTERY, PERCUTANEOUS APPROACH: ICD-10-PCS | Performed by: SURGERY

## 2022-06-13 PROCEDURE — B41D1ZZ FLUOROSCOPY OF AORTA AND BILATERAL LOWER EXTREMITY ARTERIES USING LOW OSMOLAR CONTRAST: ICD-10-PCS | Performed by: SURGERY

## 2022-06-13 PROCEDURE — 047T3ZZ DILATION OF RIGHT PERONEAL ARTERY, PERCUTANEOUS APPROACH: ICD-10-PCS | Performed by: SURGERY

## 2022-06-13 RX ORDER — LIDOCAINE HYDROCHLORIDE 10 MG/ML
INJECTION, SOLUTION EPIDURAL; INFILTRATION; INTRACAUDAL; PERINEURAL
Status: COMPLETED
Start: 2022-06-13 | End: 2022-06-13

## 2022-06-13 RX ORDER — NITROGLYCERIN 20 MG/100ML
INJECTION INTRAVENOUS
Status: COMPLETED
Start: 2022-06-13 | End: 2022-06-13

## 2022-06-13 RX ORDER — LABETALOL HYDROCHLORIDE 5 MG/ML
INJECTION, SOLUTION INTRAVENOUS
Status: COMPLETED
Start: 2022-06-13 | End: 2022-06-13

## 2022-06-13 RX ORDER — HEPARIN SODIUM 5000 [USP'U]/ML
INJECTION, SOLUTION INTRAVENOUS; SUBCUTANEOUS
Status: COMPLETED
Start: 2022-06-13 | End: 2022-06-13

## 2022-06-13 RX ORDER — MIDAZOLAM HYDROCHLORIDE 1 MG/ML
INJECTION INTRAMUSCULAR; INTRAVENOUS
Status: COMPLETED
Start: 2022-06-13 | End: 2022-06-13

## 2022-06-13 RX ORDER — DIPHENHYDRAMINE HYDROCHLORIDE 50 MG/ML
INJECTION INTRAMUSCULAR; INTRAVENOUS
Status: COMPLETED
Start: 2022-06-13 | End: 2022-06-13

## 2022-06-13 NOTE — PLAN OF CARE
NURSING DISCHARGE NOTE    Discharged Home via Wheelchair. Accompanied by Family member and RN  Belongings Taken by patient/family. Assumed care 1200  Pt AxO4  -agitated/irritated  RA  NSR  No pain at this time, discomfort back pain  1x assist/standby  -ambulated with surgical shoes in halls    Post-cath  -L groin access (tegaderm/gauze)   =At 1500 bleeding, manual pressure HELD, dressing changed. No bleeding at this time  -BLE pedal/post tib->dopplers  -bedrest for 6 hrs  -lay flat for 2 hrs    -adequate urine output  Manage BG levels  R PICC  -will be discharged with PICC in place  Received IV antbx prior to discharge  Pt and family informed via phone call/bedside, all questions answered. Pt informed and given discharge instructions, informed of new medications, f/u dates, and home medications. Pt informed to be careful with PICC line and how to take care of central line handout given. Pt informed of IV antbx and handout given. Pt verbally understands discharge instructions, all questions answered.     Problem: Patient/Family Goals  Goal: Patient/Family Long Term Goal  Description: Patient's Long Term Goal: Discharge home    Interventions:  - MRI  -Ambulate TID  - See additional Care Plan goals for specific interventions  Outcome: Adequate for Discharge  Goal: Patient/Family Short Term Goal  Description: Patient's Short Term Goal: Comfort    Interventions:   - Pain meds as needed  - See additional Care Plan goals for specific interventions  Outcome: Adequate for Discharge     Problem: PAIN - ADULT  Goal: Verbalizes/displays adequate comfort level or patient's stated pain goal  Description: INTERVENTIONS:  - Encourage pt to monitor pain and request assistance  - Assess pain using appropriate pain scale  - Administer analgesics based on type and severity of pain and evaluate response  - Implement non-pharmacological measures as appropriate and evaluate response  - Consider cultural and social influences on pain and pain management  - Manage/alleviate anxiety  - Utilize distraction and/or relaxation techniques  - Monitor for opioid side effects  - Notify MD/LIP if interventions unsuccessful or patient reports new pain  - Anticipate increased pain with activity and pre-medicate as appropriate  Outcome: Adequate for Discharge     Problem: SAFETY ADULT - FALL  Goal: Free from fall injury  Description: INTERVENTIONS:  - Assess pt frequently for physical needs  - Identify cognitive and physical deficits and behaviors that affect risk of falls.   - Agency fall precautions as indicated by assessment.  - Educate pt/family on patient safety including physical limitations  - Instruct pt to call for assistance with activity based on assessment  - Modify environment to reduce risk of injury  - Provide assistive devices as appropriate  - Consider OT/PT consult to assist with strengthening/mobility  - Encourage toileting schedule  Outcome: Adequate for Discharge     Problem: DISCHARGE PLANNING  Goal: Discharge to home or other facility with appropriate resources  Description: INTERVENTIONS:  - Identify barriers to discharge w/pt and caregiver  - Include patient/family/discharge partner in discharge planning  - Arrange for needed discharge resources and transportation as appropriate  - Identify discharge learning needs (meds, wound care, etc)  - Arrange for interpreters to assist at discharge as needed  - Consider post-discharge preferences of patient/family/discharge partner  - Complete POLST form as appropriate  - Assess patient's ability to be responsible for managing their own health  - Refer to Case Management Department for coordinating discharge planning if the patient needs post-hospital services based on physician/LIP order or complex needs related to functional status, cognitive ability or social support system  Outcome: Adequate for Discharge

## 2022-06-13 NOTE — PLAN OF CARE
Patient alert and oriented x4. VSS, on RA. Denies pain. Ambulates with SBA and surgical shoes to feet. IV ABT infusing per orders via RUE PICC. Dressing changed to right foot ulcer site this AM. NPO at this time for angio. Plan: Angio this AM, pt cleared to discharge on IV antibiotics from ID standpoint. Will need dose of Invanz prior to discharge.

## 2022-06-13 NOTE — CM/SW NOTE
Spoke with Maylin from [facility] confirmed dc for today. Requesting updated labs- sent in Aidin. Maylin to call pt to coordinate delivery for tonight at home, [facility] made aware as well on dc. RN made aware as well.     Lizeth Massey, IZAIAH  Discharge [facility]

## 2022-06-13 NOTE — PLAN OF CARE
Patient alert and oriented x4 ,vital signs stable . Gets up with min assist with walker and post op shoe . Dressing to right foot clean and dry ,change daily with betadine and dry gauze . Right picc line intact . On iv unasyn q 8 hrs . Denies any pain . NPO after midnight for angiogram and possible stent placement in am .Refusing scd's , on lovenox . Plan of care updated . All safety precautions in place . Reminded to \"call don't fall\" . Will continue to monitor .

## 2022-06-14 ENCOUNTER — PATIENT OUTREACH (OUTPATIENT)
Dept: CASE MANAGEMENT | Age: 72
End: 2022-06-14

## 2022-06-14 DIAGNOSIS — Z02.9 ENCOUNTERS FOR UNSPECIFIED ADMINISTRATIVE PURPOSE: ICD-10-CM

## 2022-06-14 DIAGNOSIS — E11.621 DIABETIC ULCER OF TOE OF LEFT FOOT ASSOCIATED WITH TYPE 2 DIABETES MELLITUS, UNSPECIFIED ULCER STAGE (HCC): ICD-10-CM

## 2022-06-14 DIAGNOSIS — L97.529 DIABETIC ULCER OF TOE OF LEFT FOOT ASSOCIATED WITH TYPE 2 DIABETES MELLITUS, UNSPECIFIED ULCER STAGE (HCC): ICD-10-CM

## 2022-06-14 PROCEDURE — 1111F DSCHRG MED/CURRENT MED MERGE: CPT

## 2022-06-14 NOTE — PROGRESS NOTES
VM received; pt daughter, Glenroy Motley requesting assistance w/scheduling apt (dc 06/13)    Dr Lizzeth Howard  Internal Medicine, IP Consult to Primary Care  [facility]  [address] [facility] [address] [phone]  Follow up 1 week  Apt made:  Tue 06/21 @3:45pm    Dr Lui Galindo, Internal Medicine  [address] [place] [address] [phone]  Follow up 2 weeks  LV w/office to call pt daughter, Glenroy Motley to schedule apt    Dr Box [facility], [address] [place] [address]  [phone]  Follow up 4 weeks  Apt made:   Thu 07/07 @2:00pm  Confirmed w/pt daughter  Closing encounter

## 2022-06-20 ENCOUNTER — OFFICE VISIT (OUTPATIENT)
Dept: WOUND CARE | Facility: HOSPITAL | Age: 72
End: 2022-06-20
Attending: STUDENT IN AN ORGANIZED HEALTH CARE EDUCATION/TRAINING PROGRAM
Payer: MEDICARE

## 2022-06-20 VITALS
WEIGHT: 160 LBS | DIASTOLIC BLOOD PRESSURE: 75 MMHG | HEIGHT: 71 IN | BODY MASS INDEX: 22.4 KG/M2 | SYSTOLIC BLOOD PRESSURE: 165 MMHG | RESPIRATION RATE: 18 BRPM | HEART RATE: 51 BPM | TEMPERATURE: 98 F

## 2022-06-20 DIAGNOSIS — L97.512 FOOT ULCER, RIGHT, WITH FAT LAYER EXPOSED (HCC): Primary | ICD-10-CM

## 2022-06-20 DIAGNOSIS — I73.9 PAD (PERIPHERAL ARTERY DISEASE) (HCC): ICD-10-CM

## 2022-06-20 DIAGNOSIS — E11.9 TYPE 2 DIABETES MELLITUS WITHOUT COMPLICATION, WITHOUT LONG-TERM CURRENT USE OF INSULIN (HCC): ICD-10-CM

## 2022-06-20 DIAGNOSIS — L97.522 FOOT ULCER, LEFT, WITH FAT LAYER EXPOSED (HCC): ICD-10-CM

## 2022-06-20 DIAGNOSIS — M86.171 ACUTE OSTEOMYELITIS OF METATARSAL BONE OF RIGHT FOOT (HCC): ICD-10-CM

## 2022-06-20 LAB — AMB EXT GLUCOSE: 113 MG/DL

## 2022-06-20 PROCEDURE — 11042 DBRDMT SUBQ TIS 1ST 20SQCM/<: CPT | Performed by: STUDENT IN AN ORGANIZED HEALTH CARE EDUCATION/TRAINING PROGRAM

## 2022-06-20 NOTE — PROGRESS NOTES
Patient ID: Winter Sy is a 80year old male. Debridement   Wound 06/20/22 #1 right plantar foot Diabetic Ulcer Right;Plantar    Consent obtained? verbal  Consent given by: patient  Risks discussed? procedural risks discussed  Time out called at 6/20/2022 11:14 AM  Immediately prior to the procedure a time out was called  Performed by: provider  Debridement type: surgical  Level of debridement: subcutaneous tissue  Pain control: lidocaine 4%  Pre-debridement measurements  Length (cm): 0.8  Width (cm): 0.9  Depth (cm): 0.2  Surface Area (cm^2): 0.72  Volume (cm^3): 0.14    Post-debridement measurements  Length (cm): 0.9  Width (cm): 1  Depth (cm): 0.2  Percent debrided: 100%  Surface Area (cm^2): 0.9  Area debrided (cm^2): 0.9  Volume (cm^3): 0.18  Tissue and other material debrided: subcutaneous tissue  Devitalized tissue debrided: biofilm, exudate and fibrin  Instrument(s) utilized: blade  Bleeding: large  Hemostasis obtained with: silver nitrate  Procedural pain (0-10): 0  Post-procedural pain: 0   Response to treatment: procedure was tolerated well    Debridement   Wound 06/20/22 #2 left latral foot Diabetic Ulcer Foot Left;Lateral    Consent obtained? verbal  Consent given by: patient  Risks discussed?  procedural risks discussed  Time out called at 6/20/2022 11:24 AM  Immediately prior to the procedure a time out was called  Performed by: provider  Debridement type: surgical  Level of debridement: subcutaneous tissue  Pain control: lidocaine 4%  Pre-debridement measurements  Length (cm): 1.5  Width (cm): 1.5  Surface Area (cm^2): 2.25      Post-debridement measurements  Length (cm): 1.8  Width (cm): 1.8  Depth (cm): 0.1  Percent debrided: 100%  Surface Area (cm^2): 3.24  Area debrided (cm^2): 3.24  Volume (cm^3): 0.32  Tissue and other material debrided: subcutaneous tissue  Devitalized tissue debrided: callus and fibrin  Instrument(s) utilized: blade  Bleeding: medium  Hemostasis obtained with: silver nitrate  Procedural pain (0-10): 0  Post-procedural pain: 0   Response to treatment: procedure was tolerated well

## 2022-06-20 NOTE — PROGRESS NOTES
Weekly Wound Education Note    Teaching Provided To: Patient; Family  Training Topics: Discharge instructions;Diabetes;Cleasing and general instructions;Dressing     Training Response: Patient responds and understands      Honey gel with dry dressing to let and right lateral feet dialy, tubi , Darco offloading shoe to both feet

## 2022-06-20 NOTE — PATIENT INSTRUCTIONS
Wound Care Patient Instructions/Details      Wound number: All wounds  Wound description: Right plantar foot    Wound Cleaning and Dressings:  May shower with protection. Protect wound and dressing. 1. Wash your hands with soap and water. Remove old dressing, discard and place into trash. Do not use tissues or cotton balls. There are no questions and answers to display. Compression Therapy ordered: No    Off-Loading: There are no questions and answers to display. Additional wounds: No    Miscellaneous Instructions:  1. Supplement with a daily multivitamin or other supplement as directed by your provider    2. Increase activity as tolerated or as directed by your provider    3. As directed by your providers perform daily dressing changes with Medihoney and dry dressing. Continue ambulating with Darco offloading shoe. Continue IV antibiotics per infectious disease. 4.   Please call the clinic if you notice increased redness, warmth or pus like drainage or         start running a fever greater than 100.4. If it is after hours please call your primary        physician or go to the nearest emergency room. 5.  The treatment plan has been discussed at length with you and your provider. Follow all       instructions carefully, it is very important. If you do not follow all instructions, you are at        risk of your wound not healing, infection, possible loss of limb and even end of life.

## 2022-06-27 ENCOUNTER — OFFICE VISIT (OUTPATIENT)
Dept: WOUND CARE | Facility: HOSPITAL | Age: 72
End: 2022-06-27
Attending: STUDENT IN AN ORGANIZED HEALTH CARE EDUCATION/TRAINING PROGRAM
Payer: MEDICARE

## 2022-06-27 VITALS — SYSTOLIC BLOOD PRESSURE: 167 MMHG | HEART RATE: 57 BPM | DIASTOLIC BLOOD PRESSURE: 73 MMHG | TEMPERATURE: 98 F

## 2022-06-27 DIAGNOSIS — L97.512 FOOT ULCER, RIGHT, WITH FAT LAYER EXPOSED (HCC): Primary | ICD-10-CM

## 2022-06-27 DIAGNOSIS — E11.9 TYPE 2 DIABETES MELLITUS WITHOUT COMPLICATION, WITHOUT LONG-TERM CURRENT USE OF INSULIN (HCC): ICD-10-CM

## 2022-06-27 DIAGNOSIS — I73.9 PAD (PERIPHERAL ARTERY DISEASE) (HCC): ICD-10-CM

## 2022-06-27 DIAGNOSIS — M86.171 ACUTE OSTEOMYELITIS OF METATARSAL BONE OF RIGHT FOOT (HCC): ICD-10-CM

## 2022-06-27 DIAGNOSIS — L97.522 FOOT ULCER, LEFT, WITH FAT LAYER EXPOSED (HCC): ICD-10-CM

## 2022-06-27 LAB — GLUCOSE BLD-MCNC: 125 MG/DL (ref 70–99)

## 2022-06-27 PROCEDURE — 11042 DBRDMT SUBQ TIS 1ST 20SQCM/<: CPT | Performed by: STUDENT IN AN ORGANIZED HEALTH CARE EDUCATION/TRAINING PROGRAM

## 2022-06-27 NOTE — PROGRESS NOTES
Patient ID: Shamika Sanchez is a 80year old male. Debridement   Wound 06/20/22 #1 right plantar foot Diabetic Ulcer Right;Plantar    Consent obtained? verbal  Consent given by: patient  Risks discussed?  procedural risks discussed  Time out called at 6/27/2022 10:44 AM  Immediately prior to the procedure a time out was called  Performed by: provider  Debridement type: surgical  Level of debridement: subcutaneous tissue  Pain control: lidocaine 4%  Pre-debridement measurements  Length (cm): 0.7  Width (cm): 1  Depth (cm): 0.1  Surface Area (cm^2): 0.7  Volume (cm^3): 0.07    Post-debridement measurements  Length (cm): 0.8  Width (cm): 1.1  Depth (cm): 0.1  Percent debrided: 100%  Surface Area (cm^2): 0.88  Area debrided (cm^2): 0.88  Volume (cm^3): 0.09  Tissue and other material debrided: subcutaneous tissue  Devitalized tissue debrided: biofilm, callus, exudate and fibrin  Instrument(s) utilized: blade  Bleeding: medium  Hemostasis obtained with: silver nitrate  Procedural pain (0-10): 0  Post-procedural pain: 0   Response to treatment: procedure was tolerated well

## 2022-06-27 NOTE — PROGRESS NOTES
Weekly Wound Education Note    Teaching Provided To: Patient; Family  Training Topics: Discharge instructions;Dressing;Off-loading  Training Method: Demonstration;Explain/Verbal;Written  Training Response: Patient responds and understands        Notes: Julia and hydrofera ready to right foot. Betadine and dry dressing to left foot. Change every other day. Continue to offload with darco shoes bilaterally. Follow up in two weeks at wound center. Can call Dr Rosales Memos office if needed before that.

## 2022-06-28 ENCOUNTER — TELEMEDICINE (OUTPATIENT)
Dept: INTERNAL MEDICINE CLINIC | Facility: CLINIC | Age: 72
End: 2022-06-28

## 2022-06-28 DIAGNOSIS — Z09 HOSPITAL DISCHARGE FOLLOW-UP: Primary | ICD-10-CM

## 2022-06-28 DIAGNOSIS — R60.9 DEPENDENT EDEMA: ICD-10-CM

## 2022-06-28 DIAGNOSIS — L97.519 ULCER OF RIGHT FOOT, UNSPECIFIED ULCER STAGE (HCC): ICD-10-CM

## 2022-06-28 PROBLEM — D64.9 ANEMIA: Status: RESOLVED | Noted: 2022-06-08 | Resolved: 2022-06-28

## 2022-06-28 PROBLEM — R73.9 HYPERGLYCEMIA: Status: RESOLVED | Noted: 2022-06-08 | Resolved: 2022-06-28

## 2022-06-28 PROBLEM — R79.89 AZOTEMIA: Status: RESOLVED | Noted: 2022-06-08 | Resolved: 2022-06-28

## 2022-06-28 PROBLEM — E87.1 HYPONATREMIA: Status: RESOLVED | Noted: 2022-06-08 | Resolved: 2022-06-28

## 2022-06-28 PROCEDURE — 99214 OFFICE O/P EST MOD 30 MIN: CPT | Performed by: INTERNAL MEDICINE

## 2022-06-28 PROCEDURE — 1111F DSCHRG MED/CURRENT MED MERGE: CPT | Performed by: INTERNAL MEDICINE

## 2022-06-28 RX ORDER — FUROSEMIDE 20 MG/1
20 TABLET ORAL DAILY
Qty: 3 TABLET | Refills: 0 | Status: SHIPPED | OUTPATIENT
Start: 2022-06-28

## 2022-06-28 NOTE — PATIENT INSTRUCTIONS
Wound Care Patient Instructions/Details      Wound number: All wounds  Wound description: Right plantar foot    Wound Cleaning and Dressings:  May shower with protection. Protect wound and dressing. 1. Wash your hands with soap and water. Remove old dressing, discard and place into trash. Do not use tissues or cotton balls. There are no questions and answers to display. Compression Therapy ordered: No    Off-Loading: There are no questions and answers to display. Additional wounds: No    Miscellaneous Instructions:  1. Supplement with a daily multivitamin or other supplement as directed by your provider    2. Increase activity as tolerated or as directed by your provider    3. As directed by your providers perform dressing changes every other day with [facility] ready. Continue IV antibiotics per infectious disease. Remain minimally weightbearing right foot with Darco offloading shoe. 4.   Please call the clinic if you notice increased redness, warmth or pus like drainage or         start running a fever greater than 100.4. If it is after hours please call your primary        physician or go to the nearest emergency room. 5.  The treatment plan has been discussed at length with you and your provider. Follow all       instructions carefully, it is very important. If you do not follow all instructions, you are at        risk of your wound not healing, infection, possible loss of limb and even end of life.

## 2022-07-11 ENCOUNTER — OFFICE VISIT (OUTPATIENT)
Dept: WOUND CARE | Facility: HOSPITAL | Age: 72
End: 2022-07-11
Attending: STUDENT IN AN ORGANIZED HEALTH CARE EDUCATION/TRAINING PROGRAM
Payer: MEDICARE

## 2022-07-11 VITALS
SYSTOLIC BLOOD PRESSURE: 141 MMHG | DIASTOLIC BLOOD PRESSURE: 59 MMHG | TEMPERATURE: 98 F | RESPIRATION RATE: 18 BRPM | HEART RATE: 54 BPM

## 2022-07-11 DIAGNOSIS — E11.9 TYPE 2 DIABETES MELLITUS WITHOUT COMPLICATION, WITHOUT LONG-TERM CURRENT USE OF INSULIN (HCC): ICD-10-CM

## 2022-07-11 DIAGNOSIS — M86.171 ACUTE OSTEOMYELITIS OF METATARSAL BONE OF RIGHT FOOT (HCC): ICD-10-CM

## 2022-07-11 DIAGNOSIS — I73.9 PAD (PERIPHERAL ARTERY DISEASE) (HCC): ICD-10-CM

## 2022-07-11 DIAGNOSIS — L97.512 FOOT ULCER, RIGHT, WITH FAT LAYER EXPOSED (HCC): Primary | ICD-10-CM

## 2022-07-11 LAB — AMB EXT GLUCOSE: 129 MG/DL

## 2022-07-11 PROCEDURE — 11042 DBRDMT SUBQ TIS 1ST 20SQCM/<: CPT | Performed by: STUDENT IN AN ORGANIZED HEALTH CARE EDUCATION/TRAINING PROGRAM

## 2022-07-11 NOTE — PROGRESS NOTES
.Weekly Wound Education Note    Teaching Provided To: Patient  Training Topics: Discharge instructions;Dressing;Cleasing and general instructions  Training Method: Explain/Verbal;Written  Training Response: Patient responds and understands        Notes: Per provider, left foot wound is healed. Right foot wound improving, continue joyce, hydrofera ready and medipore tape.

## 2022-07-11 NOTE — PATIENT INSTRUCTIONS
Wound Care Patient Instructions/Details      Wound number: All wounds  Wound description: Right plantar foot    Wound Cleaning and Dressings:  May shower with protection. Protect wound and dressing. 1. Wash your hands with soap and water. Remove old dressing, discard and place into trash. Do not use tissues or cotton balls. There are no questions and answers to display. Compression Therapy ordered: No    Off-Loading: There are no questions and answers to display. Additional wounds: No    Miscellaneous Instructions:  1. Supplement with a daily multivitamin or other supplement as directed by your provider    2. Increase activity as tolerated or as directed by your provider    3. As directed by your providers perform dressing changes every other day with Julia and Hydrofera Blue ready to right foot ulceration. Continue ambulate with Darco offloading shoes to right and left feet. Begin applying urea cream to site of callus on left foot daily. Okay to discontinue IV antibiotics from my perspective. Follow-up in 1 to 2 weeks for reevaluation. 4.   Please call the clinic if you notice increased redness, warmth or pus like drainage or         start running a fever greater than 100.4. If it is after hours please call your primary        physician or go to the nearest emergency room. 5.  The treatment plan has been discussed at length with you and your provider. Follow all       instructions carefully, it is very important. If you do not follow all instructions, you are at        risk of your wound not healing, infection, possible loss of limb and even end of life.

## 2022-07-11 NOTE — PROGRESS NOTES
Patient ID: Yazmin Meyers is a 80year old male. Debridement   Wound 06/20/22 #1 right plantar foot Diabetic Ulcer Right;Plantar    Consent obtained? verbal  Consent given by: patient  Risks discussed?  procedural risks discussed  Performed by: provider  Debridement type: surgical  Level of debridement: subcutaneous tissue    Pre-debridement measurements  Length (cm): 0.6  Width (cm): 0.9  Depth (cm): 0.1  Surface Area (cm^2): 0.54  Volume (cm^3): 0.05    Post-debridement measurements  Length (cm): 0.7  Width (cm): 1  Depth (cm): 0.2  Percent debrided: 100%  Surface Area (cm^2): 0.7  Area debrided (cm^2): 0.7  Volume (cm^3): 0.14  Tissue and other material debrided: subcutaneous tissue  Devitalized tissue debrided: biofilm, callus and slough  Instrument(s) utilized: blade  Bleeding: small  Hemostasis obtained with: pressure  Procedural pain (0-10): 0  Post-procedural pain: 0   Response to treatment: procedure was tolerated well

## 2022-07-13 RX ORDER — GLIMEPIRIDE 1 MG/1
TABLET ORAL
Qty: 90 TABLET | Refills: 0 | Status: SHIPPED | OUTPATIENT
Start: 2022-07-13

## 2022-07-18 ENCOUNTER — APPOINTMENT (OUTPATIENT)
Dept: WOUND CARE | Facility: HOSPITAL | Age: 72
End: 2022-07-18
Attending: STUDENT IN AN ORGANIZED HEALTH CARE EDUCATION/TRAINING PROGRAM
Payer: MEDICARE

## 2022-07-25 ENCOUNTER — OFFICE VISIT (OUTPATIENT)
Dept: WOUND CARE | Facility: HOSPITAL | Age: 72
End: 2022-07-25
Attending: STUDENT IN AN ORGANIZED HEALTH CARE EDUCATION/TRAINING PROGRAM
Payer: MEDICARE

## 2022-07-25 VITALS
RESPIRATION RATE: 16 BRPM | TEMPERATURE: 97 F | DIASTOLIC BLOOD PRESSURE: 71 MMHG | SYSTOLIC BLOOD PRESSURE: 161 MMHG | HEART RATE: 53 BPM

## 2022-07-25 DIAGNOSIS — L97.512 FOOT ULCER, RIGHT, WITH FAT LAYER EXPOSED (HCC): Primary | ICD-10-CM

## 2022-07-25 DIAGNOSIS — E11.9 TYPE 2 DIABETES MELLITUS WITHOUT COMPLICATION, WITHOUT LONG-TERM CURRENT USE OF INSULIN (HCC): ICD-10-CM

## 2022-07-25 DIAGNOSIS — M86.171 ACUTE OSTEOMYELITIS OF METATARSAL BONE OF RIGHT FOOT (HCC): ICD-10-CM

## 2022-07-25 DIAGNOSIS — I73.9 PAD (PERIPHERAL ARTERY DISEASE) (HCC): ICD-10-CM

## 2022-07-25 PROCEDURE — 11042 DBRDMT SUBQ TIS 1ST 20SQCM/<: CPT | Performed by: STUDENT IN AN ORGANIZED HEALTH CARE EDUCATION/TRAINING PROGRAM

## 2022-07-25 NOTE — PROGRESS NOTES
Patient ID: Madison Gray is a 71year old male. Debridement   Wound 06/20/22 #1 right plantar foot Diabetic Ulcer Right;Plantar    Consent obtained? verbal  Consent given by: patient  Risks discussed?  procedural risks discussed  Time out called at 7/25/2022 2:33 PM  Immediately prior to the procedure a time out was called  Performed by: provider  Debridement type: surgical  Level of debridement: subcutaneous tissue  Pain control: none  Pre-debridement measurements  Length (cm): 0.5  Width (cm): 0.5  Depth (cm): 0.1  Surface Area (cm^2): 0.25  Volume (cm^3): 0.03    Post-debridement measurements  Length (cm): 0.6  Width (cm): 0.6  Depth (cm): 0.1  Percent debrided: 100%  Surface Area (cm^2): 0.36  Area debrided (cm^2): 0.36  Volume (cm^3): 0.04  Tissue and other material debrided: subcutaneous tissue  Devitalized tissue debrided: biofilm, callus, exudate and fibrin  Instrument(s) utilized: blade and nippers  Bleeding: none  Hemostasis obtained with: not applicable  Procedural pain (0-10): 0  Post-procedural pain: 0   Response to treatment: procedure was tolerated well

## 2022-07-25 NOTE — PROGRESS NOTES
Weekly Wound Education Note    Teaching Provided To: Patient  Training Topics: Discharge instructions;Dressing;Cleasing and general instructions  Training Method: Explain/Verbal;Written  Training Response: Patient responds and understands        Notes: cleanse right foot wound with saline or wound cleanser apply joyce slightly moistened with saline and cover with hydrophera ready writing side away from wound, secure with tape, change every other day

## 2022-07-25 NOTE — PATIENT INSTRUCTIONS
Wound Care Patient Instructions/Details      Wound number: All wounds  Wound description: Right plantar fifth metatarsal head    Wound Cleaning and Dressings:  May shower with protection. Protect wound and dressing. 1. Wash your hands with soap and water. Remove old dressing, discard and place into trash. Do not use tissues or cotton balls. There are no questions and answers to display. Compression Therapy ordered: No    Off-Loading: There are no questions and answers to display. Additional wounds: No    Miscellaneous Instructions:  1. Supplement with a daily multivitamin or other supplement as directed by your provider    2. Increase activity as tolerated or as directed by your provider    3. As directed by your providers continue dressing changes every other day with Julia and Hydrofera Blue ready. Remain minimally weightbearing to right foot with Darco offloading shoe. Follow-up in 2 weeks for reevaluation or sooner if other concerns arise. 4.   Please call the clinic if you notice increased redness, warmth or pus like drainage or         start running a fever greater than 100.4. If it is after hours please call your primary        physician or go to the nearest emergency room. 5.  The treatment plan has been discussed at length with you and your provider. Follow all       instructions carefully, it is very important. If you do not follow all instructions, you are at        risk of your wound not healing, infection, possible loss of limb and even end of life.

## 2022-08-01 ENCOUNTER — APPOINTMENT (OUTPATIENT)
Dept: WOUND CARE | Facility: HOSPITAL | Age: 72
End: 2022-08-01
Attending: STUDENT IN AN ORGANIZED HEALTH CARE EDUCATION/TRAINING PROGRAM
Payer: MEDICARE

## 2022-08-08 ENCOUNTER — APPOINTMENT (OUTPATIENT)
Dept: WOUND CARE | Facility: HOSPITAL | Age: 72
End: 2022-08-08
Attending: STUDENT IN AN ORGANIZED HEALTH CARE EDUCATION/TRAINING PROGRAM
Payer: MEDICARE

## 2022-08-10 ENCOUNTER — OFFICE VISIT (OUTPATIENT)
Dept: PODIATRY CLINIC | Facility: CLINIC | Age: 72
End: 2022-08-10
Payer: COMMERCIAL

## 2022-08-10 DIAGNOSIS — I73.9 PAD (PERIPHERAL ARTERY DISEASE) (HCC): ICD-10-CM

## 2022-08-10 DIAGNOSIS — E11.42 DIABETIC POLYNEUROPATHY ASSOCIATED WITH TYPE 2 DIABETES MELLITUS (HCC): ICD-10-CM

## 2022-08-10 DIAGNOSIS — L97.512 FOOT ULCER, RIGHT, WITH FAT LAYER EXPOSED (HCC): Primary | ICD-10-CM

## 2022-08-10 DIAGNOSIS — Z89.422 HISTORY OF AMPUTATION OF LESSER TOE, LEFT (HCC): ICD-10-CM

## 2022-08-10 PROCEDURE — 99213 OFFICE O/P EST LOW 20 MIN: CPT | Performed by: STUDENT IN AN ORGANIZED HEALTH CARE EDUCATION/TRAINING PROGRAM

## 2022-08-11 DIAGNOSIS — I10 ESSENTIAL (PRIMARY) HYPERTENSION: ICD-10-CM

## 2022-08-11 RX ORDER — LISINOPRIL 20 MG/1
TABLET ORAL
Qty: 90 TABLET | Refills: 0 | Status: SHIPPED | OUTPATIENT
Start: 2022-08-11

## 2022-08-11 NOTE — TELEPHONE ENCOUNTER
Last time medication was refilled historical  Quantity and # of refills historical  Last OV 6/28/22  Next OV none scheduled

## 2022-08-14 NOTE — PATIENT INSTRUCTIONS
-Dress ulcer site with betadine and dry dressing.  -Ambulate with darco offloading shoe.  -Seek immediate medical attention if any acute SOI arise.  -Follow up in 1-2 weeks for re-evaluation.

## 2022-08-15 ENCOUNTER — TELEPHONE (OUTPATIENT)
Dept: ENDOCRINOLOGY CLINIC | Facility: CLINIC | Age: 72
End: 2022-08-15

## 2022-08-15 ENCOUNTER — APPOINTMENT (OUTPATIENT)
Dept: WOUND CARE | Facility: HOSPITAL | Age: 72
End: 2022-08-15
Attending: STUDENT IN AN ORGANIZED HEALTH CARE EDUCATION/TRAINING PROGRAM
Payer: MEDICARE

## 2022-08-15 DIAGNOSIS — E11.65 TYPE 2 DIABETES MELLITUS WITH HYPERGLYCEMIA (HCC): ICD-10-CM

## 2022-08-15 DIAGNOSIS — F32.0 MILD MAJOR DEPRESSION (HCC): ICD-10-CM

## 2022-08-15 RX ORDER — SERTRALINE HYDROCHLORIDE 100 MG/1
TABLET, FILM COATED ORAL
Qty: 90 TABLET | Refills: 1 | Status: SHIPPED | OUTPATIENT
Start: 2022-08-15

## 2022-08-15 NOTE — TELEPHONE ENCOUNTER
Received request for orders for [facility] through [facility]. Prepped for provider review tomorrow.

## 2022-08-15 NOTE — TELEPHONE ENCOUNTER
Sertraline 100 mg tab  Last time medication was refilled 5/20/22  Quantity and # of refills 90 tab 1 refill  Last OV VV 6/28/22  Next OV No apt scheduled

## 2022-08-16 NOTE — TELEPHONE ENCOUNTER
KR signed and sent too [phone] fax confirmed and sent to scan   Made copy and placed in brown folder

## 2022-08-18 DIAGNOSIS — I25.10 CORONARY ARTERY DISEASE INVOLVING NATIVE HEART WITHOUT ANGINA PECTORIS, UNSPECIFIED VESSEL OR LESION TYPE: ICD-10-CM

## 2022-08-18 RX ORDER — CLOPIDOGREL BISULFATE 75 MG/1
75 TABLET ORAL DAILY
Qty: 90 TABLET | Refills: 0 | Status: SHIPPED | OUTPATIENT
Start: 2022-08-18

## 2022-08-18 NOTE — TELEPHONE ENCOUNTER
Clopidogrel 75 mg tab  Last time medication was refilled 5/20/22  Quantity and # of refills 90 tab  Last OV VV 6/28  Next OV Over due for HRA as of 3/22

## 2022-08-18 NOTE — TELEPHONE ENCOUNTER
Attempted to contact pt several with no response all per protocol. Pt is required to follow up within 6 months. Per last note pt should follow up around 8/26/22. Left several VM with no response. Ok to give a 30 supply?

## 2022-08-22 ENCOUNTER — APPOINTMENT (OUTPATIENT)
Dept: WOUND CARE | Facility: HOSPITAL | Age: 72
End: 2022-08-22
Attending: STUDENT IN AN ORGANIZED HEALTH CARE EDUCATION/TRAINING PROGRAM
Payer: MEDICARE

## 2022-08-29 ENCOUNTER — APPOINTMENT (OUTPATIENT)
Dept: WOUND CARE | Facility: HOSPITAL | Age: 72
End: 2022-08-29
Attending: STUDENT IN AN ORGANIZED HEALTH CARE EDUCATION/TRAINING PROGRAM
Payer: MEDICARE

## 2022-09-12 ENCOUNTER — APPOINTMENT (OUTPATIENT)
Dept: WOUND CARE | Facility: HOSPITAL | Age: 72
End: 2022-09-12
Attending: STUDENT IN AN ORGANIZED HEALTH CARE EDUCATION/TRAINING PROGRAM
Payer: MEDICARE

## 2022-09-15 DIAGNOSIS — E11.65 TYPE 2 DIABETES MELLITUS WITH HYPERGLYCEMIA (HCC): ICD-10-CM

## 2022-09-23 ENCOUNTER — OFFICE VISIT (OUTPATIENT)
Dept: PODIATRY CLINIC | Facility: CLINIC | Age: 72
End: 2022-09-23

## 2022-09-23 ENCOUNTER — OFFICE VISIT (OUTPATIENT)
Dept: ENDOCRINOLOGY CLINIC | Facility: CLINIC | Age: 72
End: 2022-09-23

## 2022-09-23 VITALS
RESPIRATION RATE: 16 BRPM | HEIGHT: 69 IN | HEART RATE: 69 BPM | WEIGHT: 144 LBS | OXYGEN SATURATION: 99 % | DIASTOLIC BLOOD PRESSURE: 60 MMHG | SYSTOLIC BLOOD PRESSURE: 128 MMHG | BODY MASS INDEX: 21.33 KG/M2

## 2022-09-23 DIAGNOSIS — Z89.422 HISTORY OF AMPUTATION OF LESSER TOE, LEFT (HCC): ICD-10-CM

## 2022-09-23 DIAGNOSIS — E11.42 DIABETIC POLYNEUROPATHY ASSOCIATED WITH TYPE 2 DIABETES MELLITUS (HCC): ICD-10-CM

## 2022-09-23 DIAGNOSIS — E11.21 TYPE 2 DIABETES MELLITUS WITH DIABETIC NEPHROPATHY, UNSPECIFIED WHETHER LONG TERM INSULIN USE (HCC): Primary | ICD-10-CM

## 2022-09-23 DIAGNOSIS — L97.512 FOOT ULCER, RIGHT, WITH FAT LAYER EXPOSED (HCC): Primary | ICD-10-CM

## 2022-09-23 DIAGNOSIS — B35.1 ONYCHOMYCOSIS: ICD-10-CM

## 2022-09-23 DIAGNOSIS — I73.9 PAD (PERIPHERAL ARTERY DISEASE) (HCC): ICD-10-CM

## 2022-09-23 LAB
CARTRIDGE LOT#: 978 NUMERIC
HEMOGLOBIN A1C: 6.7 % (ref 4.3–5.6)

## 2022-09-23 PROCEDURE — 99214 OFFICE O/P EST MOD 30 MIN: CPT | Performed by: NURSE PRACTITIONER

## 2022-09-23 PROCEDURE — 1126F AMNT PAIN NOTED NONE PRSNT: CPT | Performed by: STUDENT IN AN ORGANIZED HEALTH CARE EDUCATION/TRAINING PROGRAM

## 2022-09-23 PROCEDURE — 3074F SYST BP LT 130 MM HG: CPT | Performed by: NURSE PRACTITIONER

## 2022-09-23 PROCEDURE — 3078F DIAST BP <80 MM HG: CPT | Performed by: NURSE PRACTITIONER

## 2022-09-23 PROCEDURE — 95251 CONT GLUC MNTR ANALYSIS I&R: CPT | Performed by: NURSE PRACTITIONER

## 2022-09-23 PROCEDURE — 11042 DBRDMT SUBQ TIS 1ST 20SQCM/<: CPT | Performed by: STUDENT IN AN ORGANIZED HEALTH CARE EDUCATION/TRAINING PROGRAM

## 2022-09-23 PROCEDURE — 83036 HEMOGLOBIN GLYCOSYLATED A1C: CPT | Performed by: NURSE PRACTITIONER

## 2022-09-23 PROCEDURE — 3008F BODY MASS INDEX DOCD: CPT | Performed by: NURSE PRACTITIONER

## 2022-09-23 RX ORDER — GLIMEPIRIDE 1 MG/1
1 TABLET ORAL
Qty: 90 TABLET | Refills: 1 | Status: SHIPPED | OUTPATIENT
Start: 2022-09-23

## 2022-09-25 NOTE — PATIENT INSTRUCTIONS
- Dress ulcer with Betadine and dry dressing daily. Remain minimally weightbearing to right foot with Darco offloading shoe. Seek immediate medical attention for any acute signs of infection or other concerns arise. Follow-up in 1 to 2 weeks for reevaluation.

## 2022-09-26 ENCOUNTER — TELEPHONE (OUTPATIENT)
Dept: ENDOCRINOLOGY CLINIC | Facility: CLINIC | Age: 72
End: 2022-09-26

## 2022-09-26 NOTE — TELEPHONE ENCOUNTER
Urine was collected but left in metal box in restroom and never sent to lab. Contacted lab to let them know. The lab also contacted the patient regarding the lab. I will call him to explain. Do you want patient to come in for another collection, send order to lab or wait until next appt?

## 2022-09-26 NOTE — TELEPHONE ENCOUNTER
THE [facility] lab called stating they did not received the [facility] urine sample for this patient but it was on the packing slip for 9/23/22. Please call lab back at [phone] to update on why sample was not received and how to proceed.

## 2022-09-27 NOTE — TELEPHONE ENCOUNTER
Call with patient's daughter Darryle Fresh. She reports pt did not leave a urine sample at day of appointment. She is his only method of transportation so she will do her best to get him to an [facility] lab.

## 2022-10-02 DIAGNOSIS — I10 ESSENTIAL (PRIMARY) HYPERTENSION: ICD-10-CM

## 2022-10-02 DIAGNOSIS — Z79.4 TYPE 2 DIABETES MELLITUS WITH HYPERGLYCEMIA, WITH LONG-TERM CURRENT USE OF INSULIN (HCC): ICD-10-CM

## 2022-10-02 DIAGNOSIS — E11.65 TYPE 2 DIABETES MELLITUS WITH HYPERGLYCEMIA, WITH LONG-TERM CURRENT USE OF INSULIN (HCC): ICD-10-CM

## 2022-10-02 DIAGNOSIS — I25.10 CORONARY ARTERY DISEASE INVOLVING NATIVE HEART WITHOUT ANGINA PECTORIS, UNSPECIFIED VESSEL OR LESION TYPE: ICD-10-CM

## 2022-10-03 DIAGNOSIS — E11.65 TYPE 2 DIABETES MELLITUS WITH HYPERGLYCEMIA (HCC): ICD-10-CM

## 2022-10-03 RX ORDER — ROSUVASTATIN CALCIUM 40 MG/1
TABLET, COATED ORAL
Qty: 30 TABLET | Refills: 0 | Status: SHIPPED | OUTPATIENT
Start: 2022-10-03

## 2022-10-03 RX ORDER — CLOPIDOGREL BISULFATE 75 MG/1
75 TABLET ORAL DAILY
Qty: 30 TABLET | Refills: 0 | Status: SHIPPED | OUTPATIENT
Start: 2022-10-03

## 2022-10-03 RX ORDER — LISINOPRIL 20 MG/1
TABLET ORAL
Qty: 30 TABLET | Refills: 0 | Status: SHIPPED | OUTPATIENT
Start: 2022-10-03

## 2022-10-03 NOTE — TELEPHONE ENCOUNTER
Pharmacy requesting 90 day supply of metformin, was sent 09/15/2022 for 30 day supply. LOV: 09/23/2022  Future Appointments   Date Time Provider Fabiana Magana   10/6/2022  4:00 PM Sierra Dobson [facility]   1/16/2023  4:15 PM [facility] NURSE [facility]   1/18/2023  4:00 PM FIFI Eduardo [facility]     Last A1c value was 6.7% done 9/23/2022.

## 2022-10-03 NOTE — TELEPHONE ENCOUNTER
Lisinopril 20 MG    Last time medication was refilled 8/11/22  Quantity and # of refills 90/0  Last OV 6/28/22 Telemedicine  Next OV No apt scheduled    Clopidogrel 75 MG    Last time medication was refilled 8/18/22  Quantity and # of refills 90/0  Last OV 6/28/22 Telemedicine  Next OV No apt scheduled     Pt overdue for HRA

## 2022-10-04 ENCOUNTER — TELEPHONE (OUTPATIENT)
Dept: INTERNAL MEDICINE CLINIC | Facility: CLINIC | Age: 72
End: 2022-10-04

## 2022-10-04 NOTE — TELEPHONE ENCOUNTER
LVM informing pt's daughter due for annual physical and to contact the office for scheduling to not delay in future refills.

## 2022-10-06 ENCOUNTER — TELEPHONE (OUTPATIENT)
Dept: PODIATRY CLINIC | Facility: CLINIC | Age: 72
End: 2022-10-06

## 2022-10-06 NOTE — TELEPHONE ENCOUNTER
Patients daughter states patient had an appointment today and cancelled due to family emergency. Next available not until 11/01 and needs something sooner due to ulcer.  Please advise

## 2022-10-06 NOTE — TELEPHONE ENCOUNTER
I called patients daughter and she needs an appointment for her father's ulcer. Has been seen in the wound clinic before but now comes here for ulcer care. She states it looks better. Appt.  Is on 10/18/2022 @ 4pm and placed on the wait list

## 2022-10-18 ENCOUNTER — TELEPHONE (OUTPATIENT)
Dept: PODIATRY CLINIC | Facility: CLINIC | Age: 72
End: 2022-10-18

## 2022-10-18 ENCOUNTER — OFFICE VISIT (OUTPATIENT)
Dept: PODIATRY CLINIC | Facility: CLINIC | Age: 72
End: 2022-10-18
Payer: COMMERCIAL

## 2022-10-18 DIAGNOSIS — E11.42 DIABETIC POLYNEUROPATHY ASSOCIATED WITH TYPE 2 DIABETES MELLITUS (HCC): ICD-10-CM

## 2022-10-18 DIAGNOSIS — Z89.422 HISTORY OF AMPUTATION OF LESSER TOE, LEFT (HCC): ICD-10-CM

## 2022-10-18 DIAGNOSIS — L97.512 FOOT ULCER, RIGHT, WITH FAT LAYER EXPOSED (HCC): Primary | ICD-10-CM

## 2022-10-18 DIAGNOSIS — I73.9 PAD (PERIPHERAL ARTERY DISEASE) (HCC): ICD-10-CM

## 2022-10-18 PROCEDURE — 11042 DBRDMT SUBQ TIS 1ST 20SQCM/<: CPT | Performed by: STUDENT IN AN ORGANIZED HEALTH CARE EDUCATION/TRAINING PROGRAM

## 2022-10-18 PROCEDURE — 1126F AMNT PAIN NOTED NONE PRSNT: CPT | Performed by: STUDENT IN AN ORGANIZED HEALTH CARE EDUCATION/TRAINING PROGRAM

## 2022-10-18 RX ORDER — EXENATIDE 2 MG/.65ML
2 INJECTION, SUSPENSION, EXTENDED RELEASE SUBCUTANEOUS WEEKLY
COMMUNITY
Start: 2021-05-14

## 2022-10-18 NOTE — TELEPHONE ENCOUNTER
S/padmini chery at podiatry  and she was updated that pt is running late and she ok'd that and Dr Juan Velasco will still see pt. Pt daughter notified.  She states they should not be any later than 420pm.

## 2022-10-18 NOTE — TELEPHONE ENCOUNTER
Patients daughter called at 3:41pm states patient has a 4pm appointment and is running late. States she would be 18 min late. Aware to reschedule and declined. Requesting to speak to a nurse and started crying states he has to be seen.  Please advise

## 2022-10-26 NOTE — PATIENT INSTRUCTIONS
-Dress site with betadine and dry dressing daily.  -Use felt offloading pad and darco shoe for ambulation.  -Seek immediate medical attention if any acute SOI arise. -RTC 2 weeks for re-evaluation.

## 2022-11-07 ENCOUNTER — TELEPHONE (OUTPATIENT)
Dept: PODIATRY CLINIC | Facility: CLINIC | Age: 72
End: 2022-11-07

## 2022-11-07 NOTE — TELEPHONE ENCOUNTER
I called daughter. They have an appointment on 11/11/2022. She was concerned about the color of wound in one spot greenish blue. He has no pain,no drainage,fever or chills. Will keep appt.

## 2022-11-09 ENCOUNTER — TELEPHONE (OUTPATIENT)
Dept: ENDOCRINOLOGY CLINIC | Facility: CLINIC | Age: 72
End: 2022-11-09

## 2022-11-09 NOTE — TELEPHONE ENCOUNTER
Received fax from [facility] for recent Chart Notes. Faxed them over to [phone]. Fax confirmed. Sent to scan.

## 2022-11-11 ENCOUNTER — OFFICE VISIT (OUTPATIENT)
Dept: PODIATRY CLINIC | Facility: CLINIC | Age: 72
End: 2022-11-11
Payer: COMMERCIAL

## 2022-11-11 DIAGNOSIS — Z89.422 HISTORY OF AMPUTATION OF LESSER TOE, LEFT (HCC): ICD-10-CM

## 2022-11-11 DIAGNOSIS — L97.512 FOOT ULCER, RIGHT, WITH FAT LAYER EXPOSED (HCC): Primary | ICD-10-CM

## 2022-11-11 DIAGNOSIS — E11.42 DIABETIC POLYNEUROPATHY ASSOCIATED WITH TYPE 2 DIABETES MELLITUS (HCC): ICD-10-CM

## 2022-11-11 DIAGNOSIS — I73.9 PAD (PERIPHERAL ARTERY DISEASE) (HCC): ICD-10-CM

## 2022-11-11 DIAGNOSIS — B35.1 ONYCHOMYCOSIS: ICD-10-CM

## 2022-11-11 PROCEDURE — 99213 OFFICE O/P EST LOW 20 MIN: CPT | Performed by: STUDENT IN AN ORGANIZED HEALTH CARE EDUCATION/TRAINING PROGRAM

## 2022-11-11 RX ORDER — AMOXICILLIN AND CLAVULANATE POTASSIUM 875; 125 MG/1; MG/1
1 TABLET, FILM COATED ORAL 2 TIMES DAILY
Qty: 14 TABLET | Refills: 0 | Status: SHIPPED | OUTPATIENT
Start: 2022-11-11

## 2022-11-13 NOTE — PATIENT INSTRUCTIONS
-Dress site with Betadine and dry dressing daily.  -Ambulate with Darco offloading shoe.  -Take Augmentin as prescribed. -Seek immediate medical attention if any acute signs of infection or other concerns arise.

## 2022-11-24 DIAGNOSIS — I25.10 CORONARY ARTERY DISEASE INVOLVING NATIVE HEART WITHOUT ANGINA PECTORIS, UNSPECIFIED VESSEL OR LESION TYPE: ICD-10-CM

## 2022-11-25 ENCOUNTER — OFFICE VISIT (OUTPATIENT)
Dept: PODIATRY CLINIC | Facility: CLINIC | Age: 72
End: 2022-11-25
Payer: COMMERCIAL

## 2022-11-25 DIAGNOSIS — I73.9 PAD (PERIPHERAL ARTERY DISEASE) (HCC): ICD-10-CM

## 2022-11-25 DIAGNOSIS — E11.42 DIABETIC POLYNEUROPATHY ASSOCIATED WITH TYPE 2 DIABETES MELLITUS (HCC): ICD-10-CM

## 2022-11-25 DIAGNOSIS — Z89.422 HISTORY OF AMPUTATION OF LESSER TOE, LEFT (HCC): ICD-10-CM

## 2022-11-25 DIAGNOSIS — L97.512 FOOT ULCER, RIGHT, WITH FAT LAYER EXPOSED (HCC): Primary | ICD-10-CM

## 2022-11-25 DIAGNOSIS — B35.1 ONYCHOMYCOSIS: ICD-10-CM

## 2022-11-25 PROCEDURE — 99213 OFFICE O/P EST LOW 20 MIN: CPT | Performed by: STUDENT IN AN ORGANIZED HEALTH CARE EDUCATION/TRAINING PROGRAM

## 2022-11-25 RX ORDER — CLOPIDOGREL BISULFATE 75 MG/1
75 TABLET ORAL DAILY
Qty: 30 TABLET | Refills: 0 | Status: SHIPPED | OUTPATIENT
Start: 2022-11-25

## 2022-11-27 NOTE — PATIENT INSTRUCTIONS
-Dress site with Betadine and Band-Aid daily. Use felt offloading pad to keep pressure off site. Ambulate with Darco offloading shoe. Follow-up in 1 week for reevaluation or sooner if other concerns arise.

## 2022-12-01 ENCOUNTER — OFFICE VISIT (OUTPATIENT)
Dept: PODIATRY CLINIC | Facility: CLINIC | Age: 72
End: 2022-12-01
Payer: COMMERCIAL

## 2022-12-01 DIAGNOSIS — E11.42 DIABETIC POLYNEUROPATHY ASSOCIATED WITH TYPE 2 DIABETES MELLITUS (HCC): ICD-10-CM

## 2022-12-01 DIAGNOSIS — B35.1 ONYCHOMYCOSIS: ICD-10-CM

## 2022-12-01 DIAGNOSIS — Z89.422 HISTORY OF AMPUTATION OF LESSER TOE, LEFT (HCC): ICD-10-CM

## 2022-12-01 DIAGNOSIS — I73.9 PAD (PERIPHERAL ARTERY DISEASE) (HCC): ICD-10-CM

## 2022-12-01 DIAGNOSIS — L97.512 FOOT ULCER, RIGHT, WITH FAT LAYER EXPOSED (HCC): Primary | ICD-10-CM

## 2022-12-01 PROCEDURE — 99213 OFFICE O/P EST LOW 20 MIN: CPT | Performed by: STUDENT IN AN ORGANIZED HEALTH CARE EDUCATION/TRAINING PROGRAM

## 2022-12-01 PROCEDURE — 1126F AMNT PAIN NOTED NONE PRSNT: CPT | Performed by: STUDENT IN AN ORGANIZED HEALTH CARE EDUCATION/TRAINING PROGRAM

## 2022-12-08 ENCOUNTER — TELEPHONE (OUTPATIENT)
Dept: INTERNAL MEDICINE CLINIC | Facility: CLINIC | Age: 72
End: 2022-12-08

## 2022-12-08 NOTE — PATIENT INSTRUCTIONS
-Dress site with Betadine and Band-Aid daily. Use felt offloading pad for ambulation. Ambulate with Darco offloading shoes. Follow-up in 1 week for reevaluation or sooner if other concerns arise.

## 2022-12-08 NOTE — TELEPHONE ENCOUNTER
Pt's daughter aware due for physical and labs. Appt made for 12/27/22. Advised to call pharmacy for refills on meds. Pt's daughter verbalized understanding.

## 2022-12-15 ENCOUNTER — OFFICE VISIT (OUTPATIENT)
Dept: PODIATRY CLINIC | Facility: CLINIC | Age: 72
End: 2022-12-15
Payer: COMMERCIAL

## 2022-12-15 DIAGNOSIS — I73.9 PAD (PERIPHERAL ARTERY DISEASE) (HCC): ICD-10-CM

## 2022-12-15 DIAGNOSIS — E11.42 DIABETIC POLYNEUROPATHY ASSOCIATED WITH TYPE 2 DIABETES MELLITUS (HCC): ICD-10-CM

## 2022-12-15 DIAGNOSIS — Z89.422 HISTORY OF AMPUTATION OF LESSER TOE, LEFT (HCC): ICD-10-CM

## 2022-12-15 DIAGNOSIS — L97.512 FOOT ULCER, RIGHT, WITH FAT LAYER EXPOSED (HCC): Primary | ICD-10-CM

## 2022-12-15 PROCEDURE — 99213 OFFICE O/P EST LOW 20 MIN: CPT | Performed by: STUDENT IN AN ORGANIZED HEALTH CARE EDUCATION/TRAINING PROGRAM

## 2022-12-21 ENCOUNTER — TELEPHONE (OUTPATIENT)
Dept: PODIATRY CLINIC | Facility: CLINIC | Age: 72
End: 2022-12-21

## 2022-12-21 NOTE — TELEPHONE ENCOUNTER
Pt's daughter canceled appointment on Friday 12-23-22 due to the weather. Pt is being seen for a foot ulcer. Rescheduled to first available appointment on 1-11-23. Is there anything caller can do until the appointment.   Please call

## 2022-12-27 ENCOUNTER — TELEMEDICINE (OUTPATIENT)
Dept: INTERNAL MEDICINE CLINIC | Facility: CLINIC | Age: 72
End: 2022-12-27
Payer: COMMERCIAL

## 2022-12-27 ENCOUNTER — OFFICE VISIT (OUTPATIENT)
Dept: PODIATRY CLINIC | Facility: CLINIC | Age: 72
End: 2022-12-27
Payer: COMMERCIAL

## 2022-12-27 DIAGNOSIS — E11.9 TYPE 2 DIABETES MELLITUS WITHOUT COMPLICATION, WITHOUT LONG-TERM CURRENT USE OF INSULIN (HCC): Primary | ICD-10-CM

## 2022-12-27 DIAGNOSIS — I73.9 PAD (PERIPHERAL ARTERY DISEASE) (HCC): ICD-10-CM

## 2022-12-27 DIAGNOSIS — Z89.422 HISTORY OF AMPUTATION OF LESSER TOE, LEFT (HCC): ICD-10-CM

## 2022-12-27 DIAGNOSIS — L98.9 SKIN LESION: ICD-10-CM

## 2022-12-27 DIAGNOSIS — M79.2 PERIPHERAL NEUROPATHIC PAIN: ICD-10-CM

## 2022-12-27 DIAGNOSIS — I10 ESSENTIAL HYPERTENSION: ICD-10-CM

## 2022-12-27 DIAGNOSIS — L97.512 FOOT ULCER, RIGHT, WITH FAT LAYER EXPOSED (HCC): Primary | ICD-10-CM

## 2022-12-27 DIAGNOSIS — E11.42 DIABETIC POLYNEUROPATHY ASSOCIATED WITH TYPE 2 DIABETES MELLITUS (HCC): ICD-10-CM

## 2022-12-27 DIAGNOSIS — B35.1 ONYCHOMYCOSIS: ICD-10-CM

## 2022-12-27 DIAGNOSIS — Z12.5 PROSTATE CANCER SCREENING: ICD-10-CM

## 2022-12-27 DIAGNOSIS — F32.0 MILD MAJOR DEPRESSION (HCC): ICD-10-CM

## 2022-12-27 DIAGNOSIS — I25.10 CORONARY ARTERY DISEASE INVOLVING NATIVE HEART WITHOUT ANGINA PECTORIS, UNSPECIFIED VESSEL OR LESION TYPE: ICD-10-CM

## 2022-12-27 PROBLEM — F33.1 MODERATE EPISODE OF RECURRENT MAJOR DEPRESSIVE DISORDER (HCC): Status: ACTIVE | Noted: 2022-12-27

## 2022-12-27 PROBLEM — L97.519 ULCER OF RIGHT FOOT (HCC): Status: RESOLVED | Noted: 2022-06-08 | Resolved: 2022-12-27

## 2022-12-27 PROCEDURE — 99442 PHONE E/M BY PHYS 11-20 MIN: CPT | Performed by: INTERNAL MEDICINE

## 2022-12-27 PROCEDURE — 1126F AMNT PAIN NOTED NONE PRSNT: CPT | Performed by: STUDENT IN AN ORGANIZED HEALTH CARE EDUCATION/TRAINING PROGRAM

## 2022-12-27 PROCEDURE — 99213 OFFICE O/P EST LOW 20 MIN: CPT | Performed by: STUDENT IN AN ORGANIZED HEALTH CARE EDUCATION/TRAINING PROGRAM

## 2022-12-27 RX ORDER — SERTRALINE HYDROCHLORIDE 100 MG/1
100 TABLET, FILM COATED ORAL DAILY
Qty: 90 TABLET | Refills: 1 | Status: SHIPPED | OUTPATIENT
Start: 2022-12-27

## 2022-12-27 RX ORDER — GABAPENTIN 300 MG/1
300 CAPSULE ORAL NIGHTLY
Qty: 90 CAPSULE | Refills: 1 | Status: SHIPPED | OUTPATIENT
Start: 2022-12-27

## 2022-12-27 RX ORDER — CLOPIDOGREL BISULFATE 75 MG/1
75 TABLET ORAL DAILY
Qty: 90 TABLET | Refills: 1 | Status: SHIPPED | OUTPATIENT
Start: 2022-12-27

## 2022-12-27 RX ORDER — GLIMEPIRIDE 1 MG/1
1 TABLET ORAL
Qty: 90 TABLET | Refills: 1 | Status: SHIPPED | OUTPATIENT
Start: 2022-12-27

## 2022-12-27 RX ORDER — LISINOPRIL 20 MG/1
20 TABLET ORAL DAILY
Qty: 90 TABLET | Refills: 1 | Status: SHIPPED | OUTPATIENT
Start: 2022-12-27

## 2022-12-27 RX ORDER — ROSUVASTATIN CALCIUM 40 MG/1
40 TABLET, COATED ORAL DAILY
Qty: 90 TABLET | Refills: 3 | Status: SHIPPED | OUTPATIENT
Start: 2022-12-27

## 2022-12-27 RX ORDER — FUROSEMIDE 20 MG/1
20 TABLET ORAL DAILY
Qty: 3 TABLET | Refills: 0 | Status: SHIPPED | OUTPATIENT
Start: 2022-12-27

## 2022-12-31 NOTE — PATIENT INSTRUCTIONS
-Continues in felt offloading pads in postop shoe to keep pressure off preulcerative site. Bring diabetic shoes to next visit. If site remains closed can transition to diabetic shoes. Seek immediate medical attention if any concerns arise in the meantime.

## 2023-01-01 NOTE — PROGRESS NOTES
Page sent to Dr Patsy Christensen to see if he would be seeing pt this am, or if it's ok to let pt have breakfast. Pt has been NPO since MN and heparin held this AM in case any procedure is indicated and able to be completed today. Dr Werner on call.  Awaiting call Anesthesia confirms case reviewed for anesthesia risk alert.

## 2023-01-31 ENCOUNTER — TELEPHONE (OUTPATIENT)
Dept: ENDOCRINOLOGY CLINIC | Facility: CLINIC | Age: 73
End: 2023-01-31

## 2023-01-31 ENCOUNTER — NURSE ONLY (OUTPATIENT)
Dept: ENDOCRINOLOGY CLINIC | Facility: CLINIC | Age: 73
End: 2023-01-31
Payer: COMMERCIAL

## 2023-01-31 NOTE — TELEPHONE ENCOUNTER
Daughter left message regarding Alicia Rule upload today. Okay to keep nurse visit today to upload Finney. Still needs A1C. Left message for [facility].

## 2023-02-01 ENCOUNTER — TELEPHONE (OUTPATIENT)
Dept: PODIATRY CLINIC | Facility: CLINIC | Age: 73
End: 2023-02-01

## 2023-02-01 ENCOUNTER — TELEMEDICINE (OUTPATIENT)
Dept: ENDOCRINOLOGY CLINIC | Facility: CLINIC | Age: 73
End: 2023-02-01
Payer: COMMERCIAL

## 2023-02-01 DIAGNOSIS — Z79.4 TYPE 2 DIABETES MELLITUS WITH DIABETIC NEPHROPATHY, WITH LONG-TERM CURRENT USE OF INSULIN (HCC): Primary | ICD-10-CM

## 2023-02-01 DIAGNOSIS — E11.21 TYPE 2 DIABETES MELLITUS WITH DIABETIC NEPHROPATHY, WITH LONG-TERM CURRENT USE OF INSULIN (HCC): Primary | ICD-10-CM

## 2023-02-01 PROCEDURE — 99442 PHONE E/M BY PHYS 11-20 MIN: CPT | Performed by: NURSE PRACTITIONER

## 2023-02-01 NOTE — TELEPHONE ENCOUNTER
Per daughter had to cancel appt for today for pt, has rs to 3/1, asking how to take care of callus in the mean time? Please advise thank you.

## 2023-03-02 ENCOUNTER — TELEPHONE (OUTPATIENT)
Dept: PODIATRY CLINIC | Facility: CLINIC | Age: 73
End: 2023-03-02

## 2023-04-18 ENCOUNTER — TELEPHONE (OUTPATIENT)
Dept: ENDOCRINOLOGY CLINIC | Facility: CLINIC | Age: 73
End: 2023-04-18

## 2023-05-07 DIAGNOSIS — I10 ESSENTIAL HYPERTENSION: ICD-10-CM

## 2023-05-07 RX ORDER — LISINOPRIL 20 MG/1
TABLET ORAL
Qty: 90 TABLET | Refills: 1 | Status: SHIPPED | OUTPATIENT
Start: 2023-05-07

## 2023-05-14 DIAGNOSIS — E11.9 TYPE 2 DIABETES MELLITUS WITHOUT COMPLICATION, WITHOUT LONG-TERM CURRENT USE OF INSULIN (HCC): ICD-10-CM

## 2023-05-15 RX ORDER — GLIMEPIRIDE 1 MG/1
TABLET ORAL
Qty: 90 TABLET | Refills: 1 | OUTPATIENT
Start: 2023-05-15

## 2023-05-15 NOTE — TELEPHONE ENCOUNTER
glimepiride 1 MG Oral Tab  Last time medication was refilled 4/30/23 for 90 day supply refill too early.

## 2023-05-30 DIAGNOSIS — E11.9 TYPE 2 DIABETES MELLITUS WITHOUT COMPLICATION, WITHOUT LONG-TERM CURRENT USE OF INSULIN (HCC): ICD-10-CM

## 2023-05-30 RX ORDER — GLIMEPIRIDE 1 MG/1
1 TABLET ORAL
Qty: 30 TABLET | Refills: 0 | Status: SHIPPED | OUTPATIENT
Start: 2023-05-30

## 2023-05-30 NOTE — TELEPHONE ENCOUNTER
Last time medication was refilled 12/27/22  Quantity and # of refills 90/1  Last OV 12/27/22  Next OV princert sent

## 2023-07-17 ENCOUNTER — MED REC SCAN ONLY (OUTPATIENT)
Dept: INTERNAL MEDICINE CLINIC | Facility: CLINIC | Age: 73
End: 2023-07-17

## 2023-07-23 DIAGNOSIS — F32.0 MILD MAJOR DEPRESSION (HCC): ICD-10-CM

## 2023-07-23 DIAGNOSIS — E11.9 TYPE 2 DIABETES MELLITUS WITHOUT COMPLICATION, WITHOUT LONG-TERM CURRENT USE OF INSULIN (HCC): ICD-10-CM

## 2023-07-23 DIAGNOSIS — M79.2 PERIPHERAL NEUROPATHIC PAIN: ICD-10-CM

## 2023-07-23 DIAGNOSIS — I25.10 CORONARY ARTERY DISEASE INVOLVING NATIVE HEART WITHOUT ANGINA PECTORIS, UNSPECIFIED VESSEL OR LESION TYPE: ICD-10-CM

## 2023-07-24 NOTE — TELEPHONE ENCOUNTER
Last time medication was refilled 12/27/22  Quantity and # of refills 90/1  Last OV 10/27/21  Next OV none scheduled    Left message to call back and schedule in office appt if possible, complete overdue labs  Hold refill until appt is cheduled

## 2023-07-25 DIAGNOSIS — E11.9 TYPE 2 DIABETES MELLITUS WITHOUT COMPLICATION, WITHOUT LONG-TERM CURRENT USE OF INSULIN (HCC): ICD-10-CM

## 2023-07-25 RX ORDER — CLOPIDOGREL BISULFATE 75 MG/1
75 TABLET ORAL DAILY
Qty: 90 TABLET | Refills: 0 | Status: SHIPPED | OUTPATIENT
Start: 2023-07-25

## 2023-07-25 RX ORDER — GLIMEPIRIDE 1 MG/1
1 TABLET ORAL
Qty: 30 TABLET | Refills: 0 | Status: SHIPPED | OUTPATIENT
Start: 2023-07-25

## 2023-07-25 RX ORDER — GABAPENTIN 300 MG/1
300 CAPSULE ORAL NIGHTLY
Qty: 90 CAPSULE | Refills: 0 | Status: SHIPPED | OUTPATIENT
Start: 2023-07-25

## 2023-07-25 RX ORDER — SERTRALINE HYDROCHLORIDE 100 MG/1
100 TABLET, FILM COATED ORAL DAILY
Qty: 90 TABLET | Refills: 0 | Status: SHIPPED | OUTPATIENT
Start: 2023-07-25

## 2023-07-25 NOTE — TELEPHONE ENCOUNTER
[facility]  LOV: None in the last year +  Next OV & Reason:  8/9/23 HRA   Patient was notified to fast 8-10 hours drinking only water/black coffee prior to having labs drawn and may need to submit urine sample. Hemoglobin A1C will be ordered if patient has diabetes or prediabetes. Lab orders will be placed by end of day:  Yes  Patient requesting A1C: Did not request A1C  Patient informed insurance may not cover A1C and they may be responsible for cost if denied by insurance:  N/ANo  Patient requesting return call:  No      glimepiride 1 MG Oral Tab     patient requesting 90 day supply    Pharmacy name/location:  [facility]  [phone], [phone] [[id number]]     LOV:  None in the last year+    Next OV 8/9/23 HRA

## 2023-07-25 NOTE — TELEPHONE ENCOUNTER
Future Appointments   Date Time Provider Fabiana Izzy   8/9/2023 10:30 AM FIFI Palumbo EMG [facility] [address]   8/11/2023  8:00 AM Bro Richardson DPM [id number]     Sent to Dr. Iván Serrato for approval.

## 2023-08-02 ENCOUNTER — MED REC SCAN ONLY (OUTPATIENT)
Dept: INTERNAL MEDICINE CLINIC | Facility: CLINIC | Age: 73
End: 2023-08-02

## 2023-08-30 ENCOUNTER — TELEPHONE (OUTPATIENT)
Dept: INTERNAL MEDICINE CLINIC | Facility: CLINIC | Age: 73
End: 2023-08-30

## 2023-08-30 ENCOUNTER — OFFICE VISIT (OUTPATIENT)
Dept: INTERNAL MEDICINE CLINIC | Facility: CLINIC | Age: 73
End: 2023-08-30
Payer: COMMERCIAL

## 2023-08-30 VITALS
WEIGHT: 152 LBS | DIASTOLIC BLOOD PRESSURE: 70 MMHG | RESPIRATION RATE: 16 BRPM | SYSTOLIC BLOOD PRESSURE: 124 MMHG | BODY MASS INDEX: 22.51 KG/M2 | HEIGHT: 69 IN | HEART RATE: 50 BPM

## 2023-08-30 DIAGNOSIS — Z12.11 COLON CANCER SCREENING: ICD-10-CM

## 2023-08-30 DIAGNOSIS — I73.9 PAD (PERIPHERAL ARTERY DISEASE) (HCC): ICD-10-CM

## 2023-08-30 DIAGNOSIS — E11.59 HYPERTENSION ASSOCIATED WITH DIABETES: ICD-10-CM

## 2023-08-30 DIAGNOSIS — Z00.00 ENCOUNTER FOR ANNUAL HEALTH EXAMINATION: Primary | ICD-10-CM

## 2023-08-30 DIAGNOSIS — I25.10 CORONARY ARTERY DISEASE INVOLVING NATIVE HEART WITHOUT ANGINA PECTORIS, UNSPECIFIED VESSEL OR LESION TYPE: ICD-10-CM

## 2023-08-30 DIAGNOSIS — E78.5 HYPERLIPIDEMIA ASSOCIATED WITH TYPE 2 DIABETES MELLITUS: ICD-10-CM

## 2023-08-30 DIAGNOSIS — F33.1 MODERATE EPISODE OF RECURRENT MAJOR DEPRESSIVE DISORDER (HCC): ICD-10-CM

## 2023-08-30 DIAGNOSIS — E11.9 TYPE 2 DIABETES MELLITUS WITHOUT COMPLICATION, WITHOUT LONG-TERM CURRENT USE OF INSULIN (HCC): ICD-10-CM

## 2023-08-30 DIAGNOSIS — I15.2 HYPERTENSION ASSOCIATED WITH DIABETES: ICD-10-CM

## 2023-08-30 DIAGNOSIS — Z12.5 PROSTATE CANCER SCREENING: ICD-10-CM

## 2023-08-30 DIAGNOSIS — E11.69 HYPERLIPIDEMIA ASSOCIATED WITH TYPE 2 DIABETES MELLITUS: ICD-10-CM

## 2023-08-30 DIAGNOSIS — Z89.422 HISTORY OF AMPUTATION OF LESSER TOE, LEFT (HCC): ICD-10-CM

## 2023-08-30 DIAGNOSIS — M79.2 PERIPHERAL NEUROPATHIC PAIN: ICD-10-CM

## 2023-08-30 PROBLEM — L97.509 DIABETIC FOOT ULCER (HCC): Status: RESOLVED | Noted: 2021-09-28 | Resolved: 2023-08-30

## 2023-08-30 PROBLEM — E11.621 DIABETIC FOOT ULCER (HCC): Status: RESOLVED | Noted: 2021-09-28 | Resolved: 2023-08-30

## 2023-08-30 PROBLEM — M86.171 ACUTE OSTEOMYELITIS OF METATARSAL BONE OF RIGHT FOOT (HCC): Status: RESOLVED | Noted: 2023-08-30 | Resolved: 2023-08-30

## 2023-08-30 PROBLEM — S98.132A AMPUTATION OF TOE OF LEFT FOOT: Status: RESOLVED | Noted: 2021-10-27 | Resolved: 2023-08-30

## 2023-08-30 PROBLEM — S98.132A AMPUTATION OF TOE OF LEFT FOOT (HCC): Status: RESOLVED | Noted: 2021-10-27 | Resolved: 2023-08-30

## 2023-08-30 PROBLEM — F32.0 MILD MAJOR DEPRESSION (HCC): Status: RESOLVED | Noted: 2020-01-20 | Resolved: 2023-08-30

## 2023-08-30 PROBLEM — M86.171 ACUTE OSTEOMYELITIS OF METATARSAL BONE OF RIGHT FOOT (HCC): Status: ACTIVE | Noted: 2023-08-30

## 2023-08-30 PROBLEM — F32.0 MILD MAJOR DEPRESSION: Status: RESOLVED | Noted: 2020-01-20 | Resolved: 2023-08-30

## 2023-08-30 PROCEDURE — 3078F DIAST BP <80 MM HG: CPT | Performed by: NURSE PRACTITIONER

## 2023-08-30 PROCEDURE — 96160 PT-FOCUSED HLTH RISK ASSMT: CPT | Performed by: NURSE PRACTITIONER

## 2023-08-30 PROCEDURE — 1126F AMNT PAIN NOTED NONE PRSNT: CPT | Performed by: NURSE PRACTITIONER

## 2023-08-30 PROCEDURE — 1170F FXNL STATUS ASSESSED: CPT | Performed by: NURSE PRACTITIONER

## 2023-08-30 PROCEDURE — 3074F SYST BP LT 130 MM HG: CPT | Performed by: NURSE PRACTITIONER

## 2023-08-30 PROCEDURE — G0438 PPPS, INITIAL VISIT: HCPCS | Performed by: NURSE PRACTITIONER

## 2023-08-30 PROCEDURE — 1159F MED LIST DOCD IN RCRD: CPT | Performed by: NURSE PRACTITIONER

## 2023-08-30 PROCEDURE — 3008F BODY MASS INDEX DOCD: CPT | Performed by: NURSE PRACTITIONER

## 2023-08-30 PROCEDURE — 1160F RVW MEDS BY RX/DR IN RCRD: CPT | Performed by: NURSE PRACTITIONER

## 2023-09-05 DIAGNOSIS — E11.9 TYPE 2 DIABETES MELLITUS WITHOUT COMPLICATION, WITHOUT LONG-TERM CURRENT USE OF INSULIN (HCC): ICD-10-CM

## 2023-09-05 RX ORDER — GLIMEPIRIDE 1 MG/1
1 TABLET ORAL
Qty: 30 TABLET | Refills: 0 | OUTPATIENT
Start: 2023-09-05

## 2023-09-15 RX ORDER — GLIMEPIRIDE 1 MG/1
1 TABLET ORAL
Qty: 30 TABLET | Refills: 0 | Status: SHIPPED | OUTPATIENT
Start: 2023-09-15

## 2023-09-17 DIAGNOSIS — E11.9 TYPE 2 DIABETES MELLITUS WITHOUT COMPLICATION, WITHOUT LONG-TERM CURRENT USE OF INSULIN (HCC): ICD-10-CM

## 2023-09-25 ENCOUNTER — TELEPHONE (OUTPATIENT)
Dept: ENDOCRINOLOGY CLINIC | Facility: CLINIC | Age: 73
End: 2023-09-25

## 2023-09-25 NOTE — TELEPHONE ENCOUNTER
Future Appointments   Date Time Provider Fabiana Izzy   9/30/2023  8:15 AM REF BK RD REF EMG14 Ref Book 14   10/26/2023  4:15 PM FIFI Mendoza EMGDIABCTRNA [facility] [address]   11/3/2023  9:15 AM Samir Richardson DPM [id number]   2/29/2024  9:00 AM FIFI Castro EMG [facility] [address]     Received fax for [facility] for most recent medical records or OV notes LOV was 2/01/23 follow up appt is 10/26/23     Faxed notes cornelio [phone]

## 2023-09-30 ENCOUNTER — LAB ENCOUNTER (OUTPATIENT)
Dept: LAB | Age: 73
End: 2023-09-30
Attending: NURSE PRACTITIONER
Payer: MEDICARE

## 2023-09-30 DIAGNOSIS — I15.2 HYPERTENSION ASSOCIATED WITH DIABETES: ICD-10-CM

## 2023-09-30 DIAGNOSIS — E11.59 HYPERTENSION ASSOCIATED WITH DIABETES: ICD-10-CM

## 2023-09-30 DIAGNOSIS — E11.69 HYPERLIPIDEMIA ASSOCIATED WITH TYPE 2 DIABETES MELLITUS: ICD-10-CM

## 2023-09-30 DIAGNOSIS — E78.5 HYPERLIPIDEMIA ASSOCIATED WITH TYPE 2 DIABETES MELLITUS: ICD-10-CM

## 2023-09-30 DIAGNOSIS — E11.9 TYPE 2 DIABETES MELLITUS WITHOUT COMPLICATION, WITHOUT LONG-TERM CURRENT USE OF INSULIN (HCC): ICD-10-CM

## 2023-09-30 DIAGNOSIS — Z12.5 PROSTATE CANCER SCREENING: ICD-10-CM

## 2023-09-30 LAB
ALBUMIN SERPL-MCNC: 3.8 G/DL (ref 3.4–5)
ALBUMIN/GLOB SERPL: 1.3 {RATIO} (ref 1–2)
ALP LIVER SERPL-CCNC: 67 U/L
ALT SERPL-CCNC: 23 U/L
ANION GAP SERPL CALC-SCNC: 2 MMOL/L (ref 0–18)
AST SERPL-CCNC: 21 U/L (ref 15–37)
BASOPHILS # BLD AUTO: 0.06 X10(3) UL (ref 0–0.2)
BASOPHILS NFR BLD AUTO: 1 %
BILIRUB SERPL-MCNC: 0.3 MG/DL (ref 0.1–2)
BILIRUB UR QL STRIP.AUTO: NEGATIVE
BUN BLD-MCNC: 26 MG/DL (ref 7–18)
CALCIUM BLD-MCNC: 9.1 MG/DL (ref 8.5–10.1)
CHLORIDE SERPL-SCNC: 111 MMOL/L (ref 98–112)
CHOLEST SERPL-MCNC: 161 MG/DL (ref ?–200)
CLARITY UR REFRACT.AUTO: CLEAR
CO2 SERPL-SCNC: 27 MMOL/L (ref 21–32)
COMPLEXED PSA SERPL-MCNC: 0.44 NG/ML (ref ?–4)
CREAT BLD-MCNC: 1.17 MG/DL
CREAT UR-SCNC: 113 MG/DL
EGFRCR SERPLBLD CKD-EPI 2021: 66 ML/MIN/1.73M2 (ref 60–?)
EOSINOPHIL # BLD AUTO: 0.44 X10(3) UL (ref 0–0.7)
EOSINOPHIL NFR BLD AUTO: 7.3 %
ERYTHROCYTE [DISTWIDTH] IN BLOOD BY AUTOMATED COUNT: 13.3 %
EST. AVERAGE GLUCOSE BLD GHB EST-MCNC: 160 MG/DL (ref 68–126)
FASTING PATIENT LIPID ANSWER: YES
FASTING STATUS PATIENT QL REPORTED: YES
GLOBULIN PLAS-MCNC: 3 G/DL (ref 2.8–4.4)
GLUCOSE BLD-MCNC: 146 MG/DL (ref 70–99)
GLUCOSE UR STRIP.AUTO-MCNC: NORMAL MG/DL
HBA1C MFR BLD: 7.2 % (ref ?–5.7)
HCT VFR BLD AUTO: 33.1 %
HDLC SERPL-MCNC: 59 MG/DL (ref 40–59)
HGB BLD-MCNC: 11 G/DL
IMM GRANULOCYTES # BLD AUTO: 0.01 X10(3) UL (ref 0–1)
IMM GRANULOCYTES NFR BLD: 0.2 %
KETONES UR STRIP.AUTO-MCNC: NEGATIVE MG/DL
LDLC SERPL CALC-MCNC: 94 MG/DL (ref ?–100)
LEUKOCYTE ESTERASE UR QL STRIP.AUTO: NEGATIVE
LYMPHOCYTES # BLD AUTO: 1.19 X10(3) UL (ref 1–4)
LYMPHOCYTES NFR BLD AUTO: 19.8 %
MCH RBC QN AUTO: 31 PG (ref 26–34)
MCHC RBC AUTO-ENTMCNC: 33.2 G/DL (ref 31–37)
MCV RBC AUTO: 93.2 FL
MICROALBUMIN UR-MCNC: 22.5 MG/DL
MICROALBUMIN/CREAT 24H UR-RTO: 199.1 UG/MG (ref ?–30)
MONOCYTES # BLD AUTO: 0.71 X10(3) UL (ref 0.1–1)
MONOCYTES NFR BLD AUTO: 11.8 %
NEUTROPHILS # BLD AUTO: 3.59 X10 (3) UL (ref 1.5–7.7)
NEUTROPHILS # BLD AUTO: 3.59 X10(3) UL (ref 1.5–7.7)
NEUTROPHILS NFR BLD AUTO: 59.9 %
NITRITE UR QL STRIP.AUTO: NEGATIVE
NONHDLC SERPL-MCNC: 102 MG/DL (ref ?–130)
OSMOLALITY SERPL CALC.SUM OF ELEC: 297 MOSM/KG (ref 275–295)
PH UR STRIP.AUTO: 6 [PH] (ref 5–8)
PLATELET # BLD AUTO: 197 10(3)UL (ref 150–450)
POTASSIUM SERPL-SCNC: 5.4 MMOL/L (ref 3.5–5.1)
PROT SERPL-MCNC: 6.8 G/DL (ref 6.4–8.2)
PROT UR STRIP.AUTO-MCNC: 20 MG/DL
RBC # BLD AUTO: 3.55 X10(6)UL
RBC UR QL AUTO: NEGATIVE
SODIUM SERPL-SCNC: 140 MMOL/L (ref 136–145)
SP GR UR STRIP.AUTO: 1.02 (ref 1–1.03)
TRIGL SERPL-MCNC: 34 MG/DL (ref 30–149)
UROBILINOGEN UR STRIP.AUTO-MCNC: NORMAL MG/DL
VLDLC SERPL CALC-MCNC: 6 MG/DL (ref 0–30)
WBC # BLD AUTO: 6 X10(3) UL (ref 4–11)

## 2023-09-30 PROCEDURE — 80053 COMPREHEN METABOLIC PANEL: CPT

## 2023-09-30 PROCEDURE — 81001 URINALYSIS AUTO W/SCOPE: CPT

## 2023-09-30 PROCEDURE — 85025 COMPLETE CBC W/AUTO DIFF WBC: CPT

## 2023-09-30 PROCEDURE — 83036 HEMOGLOBIN GLYCOSYLATED A1C: CPT

## 2023-09-30 PROCEDURE — 82570 ASSAY OF URINE CREATININE: CPT

## 2023-09-30 PROCEDURE — 36415 COLL VENOUS BLD VENIPUNCTURE: CPT

## 2023-09-30 PROCEDURE — 80061 LIPID PANEL: CPT

## 2023-09-30 PROCEDURE — 82043 UR ALBUMIN QUANTITATIVE: CPT

## 2023-10-23 DIAGNOSIS — I25.10 CORONARY ARTERY DISEASE INVOLVING NATIVE HEART WITHOUT ANGINA PECTORIS, UNSPECIFIED VESSEL OR LESION TYPE: ICD-10-CM

## 2023-10-23 DIAGNOSIS — F32.0 MILD MAJOR DEPRESSION (HCC): ICD-10-CM

## 2023-10-23 RX ORDER — CLOPIDOGREL BISULFATE 75 MG/1
75 TABLET ORAL DAILY
Qty: 90 TABLET | Refills: 0 | Status: SHIPPED | OUTPATIENT
Start: 2023-10-23

## 2023-10-23 RX ORDER — SERTRALINE HYDROCHLORIDE 100 MG/1
100 TABLET, FILM COATED ORAL DAILY
Qty: 90 TABLET | Refills: 0 | Status: SHIPPED | OUTPATIENT
Start: 2023-10-23

## 2023-10-23 NOTE — TELEPHONE ENCOUNTER
Clopidogel 75 mg  Last time medication was refilled 7/25/23  Quantity and # of refills 90 w 0  Last OV 8/30/23  Next OV 2/29/24  Sertraline 100 mg  Last time medication was refilled 7/25/23  Quantity and # of refills 90 w 0  Last OV 8/30/23  Next OV 2/29/24  Sent to Dr. Douglas Jones for approval.

## 2023-11-14 DIAGNOSIS — E11.9 TYPE 2 DIABETES MELLITUS WITHOUT COMPLICATION, WITHOUT LONG-TERM CURRENT USE OF INSULIN (HCC): ICD-10-CM

## 2023-11-14 RX ORDER — GLIMEPIRIDE 1 MG/1
1 TABLET ORAL
Qty: 30 TABLET | Refills: 0 | Status: SHIPPED | OUTPATIENT
Start: 2023-11-14

## 2023-11-14 NOTE — TELEPHONE ENCOUNTER
Requested Prescriptions     Pending Prescriptions Disp Refills    GLIMEPIRIDE 1 MG Oral Tab [Pharmacy Med Name: GLIMEPIRIDE 1 MG TABLET] 30 tablet 0     Sig: TAKE 1 TABLET (1 MG TOTAL) BY MOUTH DAILY WITH BREAKFAST. *DUE FOR LABS AND ANNUAL PHYSICAL, PLEASE CALL THE OFFICE*     Your appointments       Date & Time Appointment Department ([place])    Nov 28, 2023  8:00 AM CST Diabetes Pump follow up with Thomas Haney, [address], [address] ( [address])        Dec 12, 2023  2:45 PM CST Follow Up Visit with Kierra Johnson, [address], [address], [place] ([address] [address])    Contact your primary care provider if your insurance requires a referral.    Please arrive 15 minutes prior to your scheduled appointment. Be sure to bring your current Insurance card, Photo ID, and medication bottles or a list of your current medications. A 24 hour notice is required to cancel any appointment or you may be charged a $40 No Show Fee. Important: 24 hour notice is required to cancel any appointment or you may be charged a $40 No Show Fee. Please notify your physician office. Feb 29, 2024  9:00 AM CST Follow Up Visit with FIFI UribeAjo [facility], Chung Andrea ([address] [facility])              [facility] [address] [facility]  [address] [facility], [address]  [address] [place] [address] [address] [id number] [phone] [facility], Chung Mena  [facility] 3  [address] [facility] [address] [id number] [phone]          Last A1c value was 7.2% done 9/30/2023.     Refill 9/15/23  LOV 2/01/23

## 2023-12-18 ENCOUNTER — TELEPHONE (OUTPATIENT)
Dept: INTERNAL MEDICINE CLINIC | Facility: CLINIC | Age: 73
End: 2023-12-18

## 2023-12-18 NOTE — TELEPHONE ENCOUNTER
Unable to reach patient for medication adherence consult. LVM for patient to call me back at [phone].

## 2023-12-19 DIAGNOSIS — E11.9 TYPE 2 DIABETES MELLITUS WITHOUT COMPLICATION, WITHOUT LONG-TERM CURRENT USE OF INSULIN (HCC): ICD-10-CM

## 2023-12-19 RX ORDER — GLIMEPIRIDE 1 MG/1
1 TABLET ORAL
Qty: 30 TABLET | Refills: 0 | Status: SHIPPED | OUTPATIENT
Start: 2023-12-19

## 2023-12-19 NOTE — TELEPHONE ENCOUNTER
Requested Prescriptions     Pending Prescriptions Disp Refills    glimepiride 1 MG Oral Tab 30 tablet 0     Sig: Take 1 tablet (1 mg total) by mouth daily with breakfast. *Due for labs and Annual physical, please call the office*     Future Appointments   Date Time Provider Fabiana Magana   12/27/2023 10:45 AM Aleyda Richardson DPM [id number]   2/29/2024  9:00 AM FIFI Guzman EMG 14 [facility] [address]     Last A1c value was 7.2% done 9/30/2023.   Refill 11/14/23  LOV 2/1/23 telemed   RTC 5 months   Cx   10/26/23  11/28/23    Called and spoke to patient daughter jerry appt pt daughter asking if [facility] can fill a 3month supply informed daughter mostly not due to [address] being more than 6 months     Future Appointments   Date Time Provider Fabiana Magana   12/27/2023 10:45 AM Quita Mooney DPM [id number]   1/2/2024  8:30 AM Caprice Holstein, APRN EMGDIABCTRNA [facility] [address]   2/29/2024  9:00 AM FIFI Guzman EMG 14 [facility] [address]

## 2023-12-26 DIAGNOSIS — E11.9 TYPE 2 DIABETES MELLITUS WITHOUT COMPLICATION, WITHOUT LONG-TERM CURRENT USE OF INSULIN (HCC): ICD-10-CM

## 2023-12-26 DIAGNOSIS — I10 ESSENTIAL HYPERTENSION: ICD-10-CM

## 2023-12-26 RX ORDER — LISINOPRIL 20 MG/1
TABLET ORAL
Qty: 90 TABLET | Refills: 1 | Status: SHIPPED | OUTPATIENT
Start: 2023-12-26

## 2023-12-26 NOTE — TELEPHONE ENCOUNTER
LISINOPRIL 20 MG Oral Tab   Last time medication was refilled 05/07/2023  Quantity and # of refills 90 w 1  Last OV 08/30/2023  Next OV   Future Appointments   Date Time Provider Fabiana Velazquezisti   12/27/2023 10:45 AM Andrei Richardson DPM [id number]   1/2/2024  8:30 AM FIFI Larios EMGDIABCTRNA [facility] [address]   2/29/2024  9:00 AM Cathallan Silver, APRN EMG [facility] [address]     Passed protocol, Rx sent. metFORMIN 500 MG Oral Tab   Last time medication was refilled 09/18/2023  Quantity and # of refills 360 w 0  Last OV 08/30/2023  Next OV   Future Appointments   Date Time Provider Fabiana Velazquezisti   12/27/2023 10:45 AM Andrei Richardson DPM [id number]   1/2/2024  8:30 AM FIFI Larios EMGDIABCTRNA [facility] [address]   2/29/2024  9:00 AM Cathallan Silver, APRN EMG 14 [facility] [address]       Passed protocol, Rx sent.

## 2024-01-20 NOTE — PATIENT INSTRUCTIONS
-Discussed importance of proper pedal hygiene, regular foot checks, and tight glucose control.  -Patient to avoid walking barefoot.  -Patient to monitor for acute signs of infection and seek immediate medical attention if any signs of infection or other concerns arise.
Patient unable to urinate as per patient. Provider made aware.
Never smoker

## 2024-01-25 ENCOUNTER — OFFICE VISIT (OUTPATIENT)
Dept: PODIATRY CLINIC | Facility: CLINIC | Age: 74
End: 2024-01-25
Payer: COMMERCIAL

## 2024-01-25 DIAGNOSIS — E11.42 DIABETIC POLYNEUROPATHY ASSOCIATED WITH TYPE 2 DIABETES MELLITUS (HCC): ICD-10-CM

## 2024-01-25 DIAGNOSIS — Z89.422 HISTORY OF AMPUTATION OF LESSER TOE, LEFT (HCC): ICD-10-CM

## 2024-01-25 DIAGNOSIS — I73.9 PAD (PERIPHERAL ARTERY DISEASE) (HCC): ICD-10-CM

## 2024-01-25 DIAGNOSIS — B35.1 ONYCHOMYCOSIS: ICD-10-CM

## 2024-01-25 DIAGNOSIS — L84 FOOT CALLUS: ICD-10-CM

## 2024-01-25 DIAGNOSIS — L97.512 FOOT ULCER, RIGHT, WITH FAT LAYER EXPOSED (HCC): Primary | ICD-10-CM

## 2024-01-25 PROCEDURE — 99213 OFFICE O/P EST LOW 20 MIN: CPT | Performed by: STUDENT IN AN ORGANIZED HEALTH CARE EDUCATION/TRAINING PROGRAM

## 2024-01-29 NOTE — PROGRESS NOTES
[facility] Podiatry  Progress Note    Roberto Oviedo is a 73 year old male.   Chief Complaint   Patient presents with    Callus     Bilateral calluses-     Wound Care     Right foot - beneath the 5th toe- patient daughter has been caring for patient- she been putting iodine and band-aid- noticed it a week an half ago- painful to the pressure- can feel it when walking- patient denies pain.          HPI:     Roberto Oviedo is a pleasant 73 year old male with PMHx of diabetes and peripheral arterial disease returns to clinic for evaluation of multiple pedal complaints.  He has not been seen in over a year.  He does present for evaluation of new ulcer to his right foot as well as elongated and thickened nails he has difficulty trimming on his own.  He has pain under his fifth metatarsal head at the site of callus/wound.  They have been dressing site with Betadine.  No other complaints are mentioned.    Allergies: Patient has no known allergies.   Current Outpatient Medications   Medication Sig Dispense Refill    LISINOPRIL 20 MG Oral Tab TAKE 1 TABLET BY MOUTH EVERY DAY 90 tablet 1    METFORMIN 500 MG Oral Tab TAKE 2 TABLETS BY MOUTH TWICE A DAY WITH MEALS 360 tablet 0    glimepiride 1 MG Oral Tab Take 1 tablet (1 mg total) by mouth daily with breakfast. *Due for labs and Annual physical, please call the office* 30 tablet 0    sertraline 100 MG Oral Tab Take 1 tablet (100 mg total) by mouth daily. 90 tablet 0    clopidogrel 75 MG Oral Tab Take 1 tablet (75 mg total) by mouth daily. 90 tablet 0    gabapentin 300 MG Oral Cap Take 1 capsule (300 mg total) by mouth nightly. 90 capsule 0    rosuvastatin 40 MG Oral Tab Take 1 tablet (40 mg total) by mouth daily. 90 tablet 3    Exenatide ER (BYDUREON) 2 MG Subcutaneous Pen-injector Inject 2 mg into the skin once a week.      Continuous Blood Gluc Sensor (FREESTYLE CLAUDE 2 SENSOR) Does not apply Misc 1 each every 14 (fourteen) days. One sensor every 14 days. 2 each 3       Past Medical History:   Diagnosis Date    Diabetes (HCC)     Essential hypertension     Heart attack (HCC)       Past Surgical History:   Procedure Laterality Date    EYE SURGERY  07/21/2022    OPEN HEART SURGICAL PROFILE        History reviewed. No pertinent family history.   Social History     Socioeconomic History    Marital status:    Tobacco Use    Smoking status: Never    Smokeless tobacco: Never   Vaping Use    Vaping Use: Never used   Substance and Sexual Activity    Alcohol use: Not Currently    Drug use: Never           REVIEW OF SYSTEMS:     Today reviewed systems as documented below  GENERAL HEALTH: feels well otherwise  SKIN: denies any unusual skin lesions or rashes  RESPIRATORY: denies shortness of breath with exertion  CARDIOVASCULAR: denies chest pain on exertion  GI: denies abdominal pain and denies heartburn  NEURO: denies headaches  MUSCULO: denies arthritis, back pain      EXAM:   There were no vitals taken for this visit.  GENERAL: well developed, well nourished, in no apparent distress  EXTREMITIES:      Derm: Full-thickness ulceration noted subright fifth metatarsal head that measures 1 cm x 0.8 cm x 0.2 cm.  Base is fibrogranular.  Periwound HPK noted.  HPK noted to left subfifth metatarsal as well.  Nails x 9 are elongated, thickened, dystrophic, subungual debris.      Vascular: Dorsalis pedis and posterior tibial pulses are are lightly palpable bilaterally.  Feet are warm bilaterally.  CFT < 5 seconds to digits bilaterally.  1+ pitting edema noted bilaterally.     Neuro: Decreased protective sensation noted to bilateral lower extremities.     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 Muscle strength testing deferred.  No pain on palpation noted to ulcer site to right plantar fifth metatarsal head. Flexion contracture of digits present bilaterally.  Prominent fifth metatarsal base bilaterally.  ASSESSMENT AND PLAN:   Diagnoses and all orders for this visit:    Foot ulcer,  right, with fat layer exposed (HCC)    History of amputation of lesser toe, left (HCC)    PAD (peripheral artery disease) (HCC)    Diabetic polyneuropathy associated with type 2 diabetes mellitus (HCC)    Onychomycosis    Foot callus        Plan:        -Patient examined, chart history reviewed.  -Ulcer inspected--stable without acute signs of infection.  Site was sharply debrided with #15 blade into and through subcutaneous tissue to healthy bleeding base.  Site dressed with Betadine and dry dressing.  Patient received similar dressing changes daily.  He should ambulate with postop shoe to right foot.  -Sharply debrided nails x 9 with nail nipper.  Nails further smoothed with Dremel.  -Educated patient on acute signs of infection advised patient to seek immediate medical attention any concerns arise.    RTC 1 to 2 weeks for reevaluation.  If no significant progress is noted we will try to arrange wound clinic visit for patient.    The patient indicates understanding of these issues and agrees to the plan.      Jayesh Richardson DPM

## 2024-02-07 ENCOUNTER — TELEPHONE (OUTPATIENT)
Dept: ENDOCRINOLOGY CLINIC | Facility: CLINIC | Age: 74
End: 2024-02-07

## 2024-02-07 DIAGNOSIS — E11.9 TYPE 2 DIABETES MELLITUS WITHOUT COMPLICATION, WITHOUT LONG-TERM CURRENT USE OF INSULIN (HCC): Primary | ICD-10-CM

## 2024-02-09 ENCOUNTER — HOSPITAL ENCOUNTER (INPATIENT)
Facility: HOSPITAL | Age: 74
LOS: 4 days | Discharge: HOME | End: 2024-02-13
Attending: EMERGENCY MEDICINE | Admitting: HOSPITALIST
Payer: MEDICARE

## 2024-02-09 ENCOUNTER — OFFICE VISIT (OUTPATIENT)
Dept: PODIATRY CLINIC | Facility: CLINIC | Age: 74
End: 2024-02-09
Payer: COMMERCIAL

## 2024-02-09 ENCOUNTER — APPOINTMENT (OUTPATIENT)
Dept: MRI IMAGING | Facility: HOSPITAL | Age: 74
End: 2024-02-09
Attending: HOSPITALIST
Payer: MEDICARE

## 2024-02-09 DIAGNOSIS — M86.171 ACUTE OSTEOMYELITIS OF RIGHT FOOT (HCC): Primary | ICD-10-CM

## 2024-02-09 DIAGNOSIS — R73.9 HYPERGLYCEMIA: ICD-10-CM

## 2024-02-09 DIAGNOSIS — Z89.422 HISTORY OF AMPUTATION OF LESSER TOE, LEFT (HCC): ICD-10-CM

## 2024-02-09 DIAGNOSIS — M86.9 OSTEOMYELITIS (HCC): ICD-10-CM

## 2024-02-09 DIAGNOSIS — I73.9 PAD (PERIPHERAL ARTERY DISEASE) (HCC): ICD-10-CM

## 2024-02-09 DIAGNOSIS — L97.514 FOOT ULCER WITH NECROSIS OF BONE, RIGHT (HCC): Primary | ICD-10-CM

## 2024-02-09 DIAGNOSIS — E11.42 DIABETIC POLYNEUROPATHY ASSOCIATED WITH TYPE 2 DIABETES MELLITUS (HCC): ICD-10-CM

## 2024-02-09 LAB
ALBUMIN SERPL-MCNC: 3.5 G/DL (ref 3.4–5)
ALBUMIN/GLOB SERPL: 0.9 {RATIO} (ref 1–2)
ALP LIVER SERPL-CCNC: 119 U/L
ALT SERPL-CCNC: 13 U/L
ANION GAP SERPL CALC-SCNC: 3 MMOL/L (ref 0–18)
AST SERPL-CCNC: 10 U/L (ref 15–37)
BASOPHILS # BLD AUTO: 0.08 X10(3) UL (ref 0–0.2)
BASOPHILS NFR BLD AUTO: 0.8 %
BILIRUB SERPL-MCNC: 0.4 MG/DL (ref 0.1–2)
BUN BLD-MCNC: 21 MG/DL (ref 9–23)
CALCIUM BLD-MCNC: 9 MG/DL (ref 8.5–10.1)
CHLORIDE SERPL-SCNC: 107 MMOL/L (ref 98–112)
CO2 SERPL-SCNC: 27 MMOL/L (ref 21–32)
CREAT BLD-MCNC: 1.14 MG/DL
EGFRCR SERPLBLD CKD-EPI 2021: 68 ML/MIN/1.73M2 (ref 60–?)
EOSINOPHIL # BLD AUTO: 0.3 X10(3) UL (ref 0–0.7)
EOSINOPHIL NFR BLD AUTO: 3.1 %
ERYTHROCYTE [DISTWIDTH] IN BLOOD BY AUTOMATED COUNT: 12.2 %
EST. AVERAGE GLUCOSE BLD GHB EST-MCNC: 171 MG/DL (ref 68–126)
GLOBULIN PLAS-MCNC: 3.8 G/DL (ref 2.8–4.4)
GLUCOSE BLD-MCNC: 198 MG/DL (ref 70–99)
GLUCOSE BLD-MCNC: 262 MG/DL (ref 70–99)
HBA1C MFR BLD: 7.6 % (ref ?–5.7)
HCT VFR BLD AUTO: 33.2 %
HGB BLD-MCNC: 11 G/DL
IMM GRANULOCYTES # BLD AUTO: 0.04 X10(3) UL (ref 0–1)
IMM GRANULOCYTES NFR BLD: 0.4 %
LYMPHOCYTES # BLD AUTO: 1.14 X10(3) UL (ref 1–4)
LYMPHOCYTES NFR BLD AUTO: 11.6 %
MCH RBC QN AUTO: 30.5 PG (ref 26–34)
MCHC RBC AUTO-ENTMCNC: 33.1 G/DL (ref 31–37)
MCV RBC AUTO: 92 FL
MONOCYTES # BLD AUTO: 0.89 X10(3) UL (ref 0.1–1)
MONOCYTES NFR BLD AUTO: 9.1 %
NEUTROPHILS # BLD AUTO: 7.35 X10 (3) UL (ref 1.5–7.7)
NEUTROPHILS # BLD AUTO: 7.35 X10(3) UL (ref 1.5–7.7)
NEUTROPHILS NFR BLD AUTO: 75 %
OSMOLALITY SERPL CALC.SUM OF ELEC: 293 MOSM/KG (ref 275–295)
PLATELET # BLD AUTO: 296 10(3)UL (ref 150–450)
POTASSIUM SERPL-SCNC: 4.4 MMOL/L (ref 3.5–5.1)
PROT SERPL-MCNC: 7.3 G/DL (ref 6.4–8.2)
RBC # BLD AUTO: 3.61 X10(6)UL
SODIUM SERPL-SCNC: 137 MMOL/L (ref 136–145)
WBC # BLD AUTO: 9.8 X10(3) UL (ref 4–11)

## 2024-02-09 PROCEDURE — 99213 OFFICE O/P EST LOW 20 MIN: CPT | Performed by: STUDENT IN AN ORGANIZED HEALTH CARE EDUCATION/TRAINING PROGRAM

## 2024-02-09 PROCEDURE — 1125F AMNT PAIN NOTED PAIN PRSNT: CPT | Performed by: STUDENT IN AN ORGANIZED HEALTH CARE EDUCATION/TRAINING PROGRAM

## 2024-02-09 PROCEDURE — 1160F RVW MEDS BY RX/DR IN RCRD: CPT | Performed by: STUDENT IN AN ORGANIZED HEALTH CARE EDUCATION/TRAINING PROGRAM

## 2024-02-09 PROCEDURE — 73718 MRI LOWER EXTREMITY W/O DYE: CPT | Performed by: HOSPITALIST

## 2024-02-09 PROCEDURE — 1159F MED LIST DOCD IN RCRD: CPT | Performed by: STUDENT IN AN ORGANIZED HEALTH CARE EDUCATION/TRAINING PROGRAM

## 2024-02-09 PROCEDURE — 99223 1ST HOSP IP/OBS HIGH 75: CPT | Performed by: HOSPITALIST

## 2024-02-09 RX ORDER — MORPHINE SULFATE 4 MG/ML
2 INJECTION, SOLUTION INTRAMUSCULAR; INTRAVENOUS EVERY 2 HOUR PRN
Status: DISCONTINUED | OUTPATIENT
Start: 2024-02-09 | End: 2024-02-13

## 2024-02-09 RX ORDER — MORPHINE SULFATE 4 MG/ML
0.5 INJECTION, SOLUTION INTRAMUSCULAR; INTRAVENOUS EVERY 2 HOUR PRN
Status: DISCONTINUED | OUTPATIENT
Start: 2024-02-09 | End: 2024-02-13

## 2024-02-09 RX ORDER — HYDROCODONE BITARTRATE AND ACETAMINOPHEN 5; 325 MG/1; MG/1
1 TABLET ORAL EVERY 4 HOURS PRN
Status: DISCONTINUED | OUTPATIENT
Start: 2024-02-09 | End: 2024-02-13

## 2024-02-09 RX ORDER — HYDROCODONE BITARTRATE AND ACETAMINOPHEN 5; 325 MG/1; MG/1
2 TABLET ORAL EVERY 4 HOURS PRN
Status: DISCONTINUED | OUTPATIENT
Start: 2024-02-09 | End: 2024-02-13

## 2024-02-09 RX ORDER — DEXTROSE MONOHYDRATE 25 G/50ML
50 INJECTION, SOLUTION INTRAVENOUS
Status: DISCONTINUED | OUTPATIENT
Start: 2024-02-09 | End: 2024-02-13

## 2024-02-09 RX ORDER — SENNOSIDES 8.6 MG
17.2 TABLET ORAL NIGHTLY PRN
Status: DISCONTINUED | OUTPATIENT
Start: 2024-02-09 | End: 2024-02-13

## 2024-02-09 RX ORDER — POLYETHYLENE GLYCOL 3350 17 G/17G
17 POWDER, FOR SOLUTION ORAL DAILY PRN
Status: DISCONTINUED | OUTPATIENT
Start: 2024-02-09 | End: 2024-02-13

## 2024-02-09 RX ORDER — ENOXAPARIN SODIUM 100 MG/ML
40 INJECTION SUBCUTANEOUS DAILY
Status: DISCONTINUED | OUTPATIENT
Start: 2024-02-09 | End: 2024-02-13

## 2024-02-09 RX ORDER — METOCLOPRAMIDE HYDROCHLORIDE 5 MG/ML
10 INJECTION INTRAMUSCULAR; INTRAVENOUS EVERY 8 HOURS PRN
Status: DISCONTINUED | OUTPATIENT
Start: 2024-02-09 | End: 2024-02-13

## 2024-02-09 RX ORDER — VANCOMYCIN 1.75 GRAM/500 ML IN 0.9 % SODIUM CHLORIDE INTRAVENOUS
25 ONCE
Status: COMPLETED | OUTPATIENT
Start: 2024-02-09 | End: 2024-02-09

## 2024-02-09 RX ORDER — CLOPIDOGREL BISULFATE 75 MG/1
75 TABLET ORAL DAILY
Status: DISCONTINUED | OUTPATIENT
Start: 2024-02-10 | End: 2024-02-13

## 2024-02-09 RX ORDER — BISACODYL 10 MG
10 SUPPOSITORY, RECTAL RECTAL
Status: DISCONTINUED | OUTPATIENT
Start: 2024-02-09 | End: 2024-02-13

## 2024-02-09 RX ORDER — NICOTINE POLACRILEX 4 MG
15 LOZENGE BUCCAL
Status: DISCONTINUED | OUTPATIENT
Start: 2024-02-09 | End: 2024-02-13

## 2024-02-09 RX ORDER — CHOLECALCIFEROL (VITAMIN D3) 125 MCG
1 CAPSULE ORAL NIGHTLY
COMMUNITY

## 2024-02-09 RX ORDER — MORPHINE SULFATE 4 MG/ML
1 INJECTION, SOLUTION INTRAMUSCULAR; INTRAVENOUS EVERY 2 HOUR PRN
Status: DISCONTINUED | OUTPATIENT
Start: 2024-02-09 | End: 2024-02-13

## 2024-02-09 RX ORDER — ONDANSETRON 2 MG/ML
4 INJECTION INTRAMUSCULAR; INTRAVENOUS EVERY 6 HOURS PRN
Status: DISCONTINUED | OUTPATIENT
Start: 2024-02-09 | End: 2024-02-13

## 2024-02-09 RX ORDER — ACETAMINOPHEN 500 MG
500 TABLET ORAL EVERY 4 HOURS PRN
Status: DISCONTINUED | OUTPATIENT
Start: 2024-02-09 | End: 2024-02-13

## 2024-02-09 RX ORDER — LISINOPRIL 20 MG/1
20 TABLET ORAL DAILY
Status: DISCONTINUED | OUTPATIENT
Start: 2024-02-10 | End: 2024-02-13

## 2024-02-09 RX ORDER — GABAPENTIN 300 MG/1
300 CAPSULE ORAL NIGHTLY
Status: DISCONTINUED | OUTPATIENT
Start: 2024-02-09 | End: 2024-02-13

## 2024-02-09 RX ORDER — SERTRALINE HYDROCHLORIDE 100 MG/1
100 TABLET, FILM COATED ORAL DAILY
Status: DISCONTINUED | OUTPATIENT
Start: 2024-02-10 | End: 2024-02-13

## 2024-02-09 RX ORDER — NICOTINE POLACRILEX 4 MG
30 LOZENGE BUCCAL
Status: DISCONTINUED | OUTPATIENT
Start: 2024-02-09 | End: 2024-02-13

## 2024-02-09 RX ORDER — ROSUVASTATIN CALCIUM 20 MG/1
40 TABLET, COATED ORAL DAILY
Status: DISCONTINUED | OUTPATIENT
Start: 2024-02-10 | End: 2024-02-13

## 2024-02-09 RX ORDER — ENEMA 19; 7 G/133ML; G/133ML
1 ENEMA RECTAL ONCE AS NEEDED
Status: DISCONTINUED | OUTPATIENT
Start: 2024-02-09 | End: 2024-02-13

## 2024-02-09 NOTE — CONSULTS
INFECTIOUS DISEASE CONSULTATION    Roberto Oviedo Patient Status:  Emergency    1950 MRN [id number]   Location [facility] EMERGENCY DEPARTMENT Attending Chester Lin, DO   Hosp Day # 0 PCP Jhonathan Freitas MD       Requested by Dr. Lin    Reason for Consultation:  Osteomyelitis right 5th MT    History of Present Illness:  Roberto Oviedo is a a(n) 73 year old male admitted from podiatry office due to ulceration right 5th MT that probes to bone.  No fevers. Normal WBC.  No recent antibiotics.   Has history of MSSA in .       History:  Past Medical History:   Diagnosis Date    Diabetes (HCC)     Essential hypertension     Heart attack (HCC)      Past Surgical History:   Procedure Laterality Date    EYE SURGERY  2022    OPEN HEART SURGICAL PROFILE       History reviewed. No pertinent family history.   reports that he has never smoked. He has never used smokeless tobacco. He reports that he does not currently use alcohol. He reports that he does not use drugs.      Allergies:  No Known Allergies    Medications:    Current Facility-Administered Medications:     vancomycin (Vancocin) 1.75 g in sodium chloride 0.9% 500mL IVPB premix, 25 mg/kg, Intravenous, Once  No current facility-administered medications on file prior to encounter.     Current Outpatient Medications on File Prior to Encounter   Medication Sig Dispense Refill    LISINOPRIL 20 MG Oral Tab TAKE 1 TABLET BY MOUTH EVERY DAY 90 tablet 1    METFORMIN 500 MG Oral Tab TAKE 2 TABLETS BY MOUTH TWICE A DAY WITH MEALS 360 tablet 0    glimepiride 1 MG Oral Tab Take 1 tablet (1 mg total) by mouth daily with breakfast. *Due for labs and Annual physical, please call the office* 30 tablet 0    sertraline 100 MG Oral Tab Take 1 tablet (100 mg total) by mouth daily. 90 tablet 0    clopidogrel 75 MG Oral Tab Take 1 tablet (75 mg total) by mouth daily. 90 tablet 0    gabapentin 300 MG Oral Cap  Take 1 capsule (300 mg total) by mouth nightly. 90 capsule 0    rosuvastatin 40 MG Oral Tab Take 1 tablet (40 mg total) by mouth daily. 90 tablet 3    Exenatide ER (BYDUREON) 2 MG Subcutaneous Pen-injector Inject 2 mg into the skin once a week.      Continuous Blood Gluc Sensor (FREESTYLE CLAUDE 2 SENSOR) Does not apply Misc 1 each every 14 (fourteen) days. One sensor every 14 days. 2 each 3       Review of Systems:    A comprehensive 10 point review of systems was completed.  Pertinent positives and negatives noted in the the HPI.      Physical Exam:    General: No acute distress. Alert and oriented x 3.  Vital signs: Temp:  [98.3 °F (36.8 °C)] 98.3 °F (36.8 °C)  Pulse:  [63-85] 63  Resp:  [14-23] 14  BP: (107-131)/(62-77) 116/64  SpO2:  [97 %-100 %] 99 %  Body mass index is 22.24 kg/m².  HEENT: Moist mucous membranes. Extraocular muscles are intact.  Neck: No swelling, no masses  Respiratory: Non labored, symmetric exursion  Cardiovascular: No irregularities in rhythm  Abdomen: Soft, nontender, nondistended.    Musculoskeletal: right foot ulceration, no celluliits    Laboratory Data:  Laboratory data reviewed      Recent Labs   Lab 02/09/24  1332   RBC 3.61*   HGB 11.0*   HCT 33.2*   MCV 92.0   MCH 30.5   MCHC 33.1   RDW 12.2   NEPRELIM 7.35   WBC 9.8   .0       Recent Labs   Lab 02/09/24  1332   *   BUN 21   CREATSERUM 1.14   CA 9.0   ALB 3.5      K 4.4      CO2 27.0   ALKPHO 119*   AST 10*   ALT 13*   BILT 0.4   TP 7.3           Established Problem list:  Patient Active Problem List   Diagnosis    Type 2 diabetes mellitus without complication, without long-term current use of insulin (HCC)    PAD (peripheral artery disease) (HCC)    Peripheral neuropathic pain    Coronary artery disease involving native heart without angina pectoris    Hyperlipidemia associated with type 2 diabetes mellitus  (HCC)    Hypertension associated with diabetes  (HCC)    Moderate episode of recurrent major  depressive disorder (HCC)    History of amputation of lesser toe, left (HCC)    Acute osteomyelitis of right foot ([facility])       ASSESSMENT/PLAN:  1. Ulceration right 5th MT, probes to bone and MRI evidence of osteomyelitis  -was debrided in podiatry office, no cultures in lab  Culture to be sent from bedside  Ho of MSSA, no history for MRSA  -site localized infection, no cellulitis    Can replace cefepime, vancomycin with unasyn for now and potentially broaden depending on future operative cultures in OR    Vascular evluation, arterial doppler          Maximiliano Vizcarra MD, MD [facility]  [phone]

## 2024-02-09 NOTE — ED QUICK NOTES
Orders for admission, patient is aware of plan and ready to go upstairs. Any questions, please call ED RN Steve at extension [phone].     Patient Covid vaccination status: Fully vaccinated     COVID Test Ordered in ED: None    COVID Suspicion at Admission: N/A    Running Infusions:  Vancomycin at 250/hr    Mental Status/LOC at time of transport: A&O x4    Other pertinent information:   CIWA score: N/A   NIH score:  N/A

## 2024-02-09 NOTE — PROGRESS NOTES
[facility] Dosing Service      Initial Pharmacokinetic Consult for Vancomycin Dosing     Roberto Oviedo is a 73 year old male who is being initiated on vancomycin therapy for osteomyelitis.  Pharmacy has been asked to dose vancomycin by Dr. Ibarra.  The initial treatment and monitoring approach will be steady state AUC strategy.        Weight and Temperature:    Wt Readings from Last 1 Encounters:   24 70.3 kg (155 lb)        Temp Readings from Last 1 Encounters:   24 98.3 °F (36.8 °C) (Temporal)      Labs:   Recent Labs   Lab 24  1332   CREATSERUM 1.14      Estimated Creatinine Clearance: 58.2 mL/min (based on SCr of 1.14 mg/dL).     Recent Labs   Lab 24  1332   WBC 9.8          The Pharmacokinetic Target is:     to 600 mg-h/L and trough <=15 mg/L    Renal Dosing Considerations:    None     Assessment/Plan:   Initial/Loading dose: Will receive 1750 mg IV (25 mg/kg, capped at 2250 mg) x 1 loading dose.      Maintenance dose: Pharmacy will dose vancomycin at 1000 mg IV every 24 hours    Monitorin) Plan for vancomycin peak and trough to be obtained at steady state    2) Pharmacy will order SCr as clinically indicated to assess renal function.    3) Pharmacy will monitor for toxicity and efficacy, adjust vancomycin dose and/or frequency, and order vancomycin levels as appropriate per the Pharmacy and Therapeutics Committee approved protocol until discontinuation of the medication.       We appreciate the opportunity to assist in the care of this patient.     Sanjuanita Callahan, PharmD  2024  4:22 PM  [facility] Extension: [phone]

## 2024-02-09 NOTE — PROGRESS NOTES
[facility] Podiatry  Progress Note    Roberto Oviedo is a 73 year old male.   Chief Complaint   Patient presents with    Wound Recheck     Right foot f/u - here with his daughter - seems is okay - no drainage noticed - states the foot seems a little swollen - has pain rated as 3-4/10 on and off          HPI:     Roberto Oviedo is a pleasant 73 year old male with PMHx of diabetes and peripheral arterial disease returns to clinic for evaluation of multiple pedal complaints.  He presents today for follow-up of right foot ulcer.  He has noticed a little bit more redness and swelling to this foot.  He has pain that he rates at a 3-4 out of 10 on and off.  He does present today with dressing but has been mostly dressing it with Betadine and dry dressing.  He has been ambulating with postop shoe.  No other complaints are mentioned.    Allergies: Patient has no known allergies.   No current outpatient medications on file.      Past Medical History:   Diagnosis Date    Diabetes (HCC)     Essential hypertension     Heart attack (HCC)       Past Surgical History:   Procedure Laterality Date    EYE SURGERY  07/21/2022    OPEN HEART SURGICAL PROFILE        History reviewed. No pertinent family history.   Social History     Socioeconomic History    Marital status:    Tobacco Use    Smoking status: Never    Smokeless tobacco: Never   Vaping Use    Vaping Use: Never used   Substance and Sexual Activity    Alcohol use: Not Currently    Drug use: Never           REVIEW OF SYSTEMS:     Denies nausea, vomiting, fever, chills.    EXAM:   There were no vitals taken for this visit.  GENERAL: well developed, well nourished, in no apparent distress  EXTREMITIES:                Derm: Full-thickness ulceration noted subright fifth metatarsal head that measures 1 cm x 0.8 cm x 0.2 cm.  Base is fibrogranular.  Today there is periwound erythema and wound is now probe to bone.  Periwound HPK noted.  HPK noted to left subfifth  metatarsal as well.      Vascular: Dorsalis pedis and posterior tibial pulses are are lightly palpable bilaterally.  Feet are warm bilaterally.  CFT < 5 seconds to digits bilaterally.  1+ pitting edema noted bilaterally.     Neuro: Decreased protective sensation noted to bilateral lower extremities.     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 Muscle strength testing deferred.  No pain on palpation noted to ulcer site to right plantar fifth metatarsal head. Flexion contracture of digits present bilaterally.  Prominent fifth metatarsal base bilaterally.  ASSESSMENT AND PLAN:   Diagnoses and all orders for this visit:    Foot ulcer with necrosis of bone, right ([facility])  -     EEH AMB POD XR - RT FOOT 3 VIEWS(AP,LAT,OBLIQUE)WT BEARING    History of amputation of lesser toe, left ([facility])    PAD (peripheral artery disease) ([facility])    Diabetic polyneuropathy associated with type 2 diabetes mellitus ([facility])          Plan:        -Patient examined, chart history reviewed.  -Ulcer inspected--site is concerning compared to last visit some swelling erythema noted.  Ulcer does now probe to bone as well.  Site was sharply debrided with #15 blade into and through subcutaneous tissue to healthy bleeding base.  Site dressed with Betadine and dry dressing.    -Updated x-rays were obtained and reviewed.  There now is some erosion to the fifth metatarsal head and possible small pathological fracture.In light of these findings as well as wound that probes to bone and new erythema, patient advised to go to the emergency room for admission.  -Patient will require MRI, infectious disease consultation, and vascular workup.  -He has had vascular intervention in the past prior to left second toe amputation which healed uneventfully.  He will likely need to be evaluated by vascular surgery prior to possible right foot surgery.  -Patient will likely require partial fifth ray amputation versus fifth metatarsal head resection of right foot pending  MRI results and further workup.  -I will be out of town this weekend but did discuss these findings with Dr. Liu who is on-call for our service.  Will continue to follow peripherally and Dr. Liu will manage this weekend.      The patient indicates understanding of these issues and agrees to the plan.      Jayesh Richardson DPM

## 2024-02-09 NOTE — ED INITIAL ASSESSMENT (HPI)
Pt to ER sent from podiatrics office. Pt here for \"admission due to worsening ulceration to his right foot. X-ray done today, findings consistent with osteomyelitis.   Per podiatrics, pt \"will require right foot MRI\"

## 2024-02-09 NOTE — PATIENT INSTRUCTIONS
Patient sent to hospital for admission.  He has worsening ulceration to his right foot that probes to bone and findings consistent with osteomyelitis on x-ray today.  He will require a right foot MRI, infectious disease consultation, and vascular consultation.  Our podiatry service will continue to follow with patient in the hospital.

## 2024-02-09 NOTE — ED PROVIDER NOTES
Patient Seen in: [facility] Emergency Department      History     Chief Complaint   Patient presents with    Wound Care     Stated Complaint: wound to right foot, hx of DM    Subjective:   73-year-old male, history of diabetes and hypertension presents from Dr. Richardson's office for admission for right foot osteomyelitis.  Has a diabetic ulcer to the right fifth metatarsal in the mid to distal area.  Seen in the office today, x-ray concerning for osteomyelitis, sent here for IV antibiotics blood cultures ID and vascular consultations.  He has no complaints, states has been there for \"a while.\"  Does not hurt him.  No fevers.  Has no complaints at this time.        Objective:   Past Medical History:   Diagnosis Date    Diabetes (HCC)     Essential hypertension     Heart attack (HCC)               Past Surgical History:   Procedure Laterality Date    EYE SURGERY  07/21/2022    OPEN HEART SURGICAL PROFILE                  Social History     Socioeconomic History    Marital status:    Tobacco Use    Smoking status: Never    Smokeless tobacco: Never   Vaping Use    Vaping Use: Never used   Substance and Sexual Activity    Alcohol use: Not Currently    Drug use: Never              Review of Systems   Constitutional:  Negative for fever.   Respiratory:  Negative for shortness of breath.    Cardiovascular:  Negative for chest pain.   Gastrointestinal:  Negative for abdominal pain.   Skin:  Positive for wound.   Neurological:  Negative for numbness.       Positive for stated complaint: wound to right foot, hx of DM  Other systems are as noted in HPI.  Constitutional and vital signs reviewed.      All other systems reviewed and negative except as noted above.    Physical Exam     ED Triage Vitals [02/09/24 1324]   /62   Pulse 85   Resp 18   Temp 98.3 °F (36.8 °C)   Temp src Temporal   SpO2 97 %   O2 Device None (Room air)       Current:/77   Pulse 74   Temp 98.3 °F (36.8 °C) (Temporal)   Resp 23    Ht 177.8 cm (5' 10\")   Wt 70.3 kg   SpO2 100%   BMI 22.24 kg/m²         Physical Exam  Vitals and nursing note reviewed.   Constitutional:       Appearance: He is not toxic-appearing.   Cardiovascular:      Rate and Rhythm: Normal rate.      Pulses: Normal pulses.   Pulmonary:      Effort: Pulmonary effort is normal. No respiratory distress.   Musculoskeletal:      Cervical back: Neck supple.   Skin:     General: Skin is warm and dry.   Neurological:      Mental Status: He is alert.   Psychiatric:         Mood and Affect: Mood normal.         Behavior: Behavior normal.         Has a circular ulcer on the plantar aspect of the right mid to distal fifth metatarsal region about 1 cm in diameter.  There is some purulence from it.  He also has some black discoloration, very small to the distal tip of the third toe.  Foot with some mild erythema.  No crepitus.  Distal pulses intact.    ED Course     Labs Reviewed   COMP METABOLIC PANEL (14) - Abnormal; Notable for the following components:       Result Value    Glucose 198 (*)     AST 10 (*)     ALT 13 (*)     Alkaline Phosphatase 119 (*)     A/G Ratio 0.9 (*)     All other components within normal limits   CBC W/ DIFFERENTIAL - Abnormal; Notable for the following components:    RBC 3.61 (*)     HGB 11.0 (*)     HCT 33.2 (*)     All other components within normal limits   CBC WITH DIFFERENTIAL WITH PLATELET    Narrative:     The following orders were created for panel order CBC With Differential With Platelet.  Procedure                               Abnormality         Status                     ---------                               -----------         ------                     CBC W/ DIFFERENTIAL[[id number]]          Abnormal            Final result                 Please view results for these tests on the individual orders.   RAINBOW DRAW LAVENDER   RAINBOW DRAW LIGHT GREEN   RAINBOW DRAW BLUE   BLOOD CULTURE   BLOOD CULTURE                      MDM         Admission disposition: 2/9/2024  3:27 PM         No results found.    Differential diagnosis includes, but not limited to, cellulitis, osteomyelitis, hyperglycemia    External chart review demonstrates outpatient visit with podiatry earlier today    73-year-old male with osteomyelitis of the right foot.  Diabetic foot wound as noted above.  Hyperglycemic.  Given vancomycin and cefepime.  ID is aware, Dr. Vizcarra and will see in consultation.  Neurovascular intact.  Culture sent.  Awaiting bed assignment.  Admitted to Dr. Ibarra                                     Medical Decision Making      Disposition and Plan     Clinical Impression:  1. Acute osteomyelitis of right foot (HCC)    2. Hyperglycemia         Disposition:  Admit  2/9/2024  3:27 pm    Follow-up:  No follow-up provider specified.        Medications Prescribed:  Current Discharge Medication List                            Hospital Problems       Present on Admission  Date Reviewed: 1/29/2024            ICD-10-CM Noted POA    * (Principal) Acute osteomyelitis of right foot (HCC) M86.171 2/9/2024 Unknown

## 2024-02-09 NOTE — ED QUICK NOTES
Zoe GOLD spoke to charge nurse about patient bed status and informed charge that transport is coming and will be bringing the patient up.

## 2024-02-09 NOTE — H&P
[facility]     Roberto Oviedo Patient Status:  Emergency    1950 MRN [id number]   Location [facility] EMERGENCY DEPARTMENT Attending Chester Lin, DO   Hosp Day # 0 PCP Jhonathan Freitas MD     Chief Complaint: Right foot wound    Subjective:    History of Present Illness:     Roberto Oviedo is a 73 year old male with a history of CAD, essential hypertension, dyslipidemia, diabetes mellitus and PVD who presents with right foot wound. Patient was seen by podiatrist and sent to the ER d/t right foot wound, probe to bone, concerning for OM. Patient with minimal right foot pain. Some drainage at home. No chest pain or shortness of breath. No nausea, vomiting, diarrhea.     History/Other:    Past Medical History:  Past Medical History:   Diagnosis Date    Diabetes (HCC)     Essential hypertension     Heart attack (HCC)      Past Surgical History:   Past Surgical History:   Procedure Laterality Date    EYE SURGERY  2022    OPEN HEART SURGICAL PROFILE        Family History:   History reviewed. No pertinent family history.  Social History:    reports that he has never smoked. He has never used smokeless tobacco. He reports that he does not currently use alcohol. He reports that he does not use drugs.     Allergies: No Known Allergies    Medications:    No current facility-administered medications on file prior to encounter.     Current Outpatient Medications on File Prior to Encounter   Medication Sig Dispense Refill    LISINOPRIL 20 MG Oral Tab TAKE 1 TABLET BY MOUTH EVERY DAY 90 tablet 1    METFORMIN 500 MG Oral Tab TAKE 2 TABLETS BY MOUTH TWICE A DAY WITH MEALS 360 tablet 0    glimepiride 1 MG Oral Tab Take 1 tablet (1 mg total) by mouth daily with breakfast. *Due for labs and Annual physical, please call the office* 30 tablet 0    sertraline 100 MG Oral Tab Take 1 tablet (100 mg total) by mouth daily. 90 tablet 0    clopidogrel 75 MG Oral Tab Take 1 tablet (75 mg  total) by mouth daily. 90 tablet 0    gabapentin 300 MG Oral Cap Take 1 capsule (300 mg total) by mouth nightly. 90 capsule 0    rosuvastatin 40 MG Oral Tab Take 1 tablet (40 mg total) by mouth daily. 90 tablet 3    Exenatide ER (BYDUREON) 2 MG Subcutaneous Pen-injector Inject 2 mg into the skin once a week.      Continuous Blood Gluc Sensor (FREESTYLE CLAUDE 2 SENSOR) Does not apply Misc 1 each every 14 (fourteen) days. One sensor every 14 days. 2 each 3       Review of Systems:   A comprehensive review of systems was completed.    Pertinent positives and negatives noted in the HPI.    Objective:   Physical Exam:    /77   Pulse 74   Temp 98.3 °F (36.8 °C) (Temporal)   Resp 23   Ht 5' 10\" (1.778 m)   Wt 155 lb (70.3 kg)   SpO2 100%   BMI 22.24 kg/m²   General: No acute distress, Alert  Respiratory: No rhonchi, no wheezes  Cardiovascular: S1, S2. Regular rate and rhythm  Abdomen: Soft, Non-tender, non-distended, positive bowel sounds  Neuro: No new focal deficits  Extremities: Trace RLE edema, small < 1cm wound lateral right foot      Results:    Labs:      Labs Last 24 Hours:    Recent Labs   Lab 02/09/24  1332   RBC 3.61*   HGB 11.0*   HCT 33.2*   MCV 92.0   MCH 30.5   MCHC 33.1   RDW 12.2   NEPRELIM 7.35   WBC 9.8   .0       Recent Labs   Lab 02/09/24  1332   *   BUN 21   CREATSERUM 1.14   EGFRCR 68   CA 9.0   ALB 3.5      K 4.4      CO2 27.0   ALKPHO 119*   AST 10*   ALT 13*   BILT 0.4   TP 7.3       Lab Results   Component Value Date    INR 1.03 10/04/2021       No results for input(s): \"TROP\", \"TROPHS\", \"CK\" in the last 168 hours.    No results for input(s): \"TROP\", \"PBNP\" in the last 168 hours.    No results for input(s): \"PCT\" in the last 168 hours.    Imaging: Imaging data reviewed in Epic.    Assessment & Plan:      #Right foot ulcer concerning for OM  -Admit  -IV abx  -MRI  -ID & Podiatry consult    #PVD  -Vascular consult    #CAD  #Essential  hypertension  #Dyslipidemia  -Plavix  -Lisinopril  -Crestor   -Monitor hemodynamics  -Telemetry    #Diabetes mellitus  -Correctional scale  -Carb scale  -A1c     #DVT Px: Lovenox    Plan of care discussed with patient and ER.    Phoenix Ibarra MD    Supplementary Documentation:     The 21st Century Cures Act makes medical notes like these available to patients in the interest of transparency. Please be advised this is a medical document. Medical documents are intended to carry relevant information, facts as evident, and the clinical opinion of the practitioner. The medical note is intended as peer to peer communication and may appear blunt or direct. It is written in medical language and may contain abbreviations or verbiage that are unfamiliar.

## 2024-02-10 DIAGNOSIS — M86.9 OSTEOMYELITIS (HCC): Primary | ICD-10-CM

## 2024-02-10 LAB
ALBUMIN SERPL-MCNC: 3.1 G/DL (ref 3.4–5)
ALBUMIN/GLOB SERPL: 0.9 {RATIO} (ref 1–2)
ALP LIVER SERPL-CCNC: 101 U/L
ALT SERPL-CCNC: 12 U/L
ANION GAP SERPL CALC-SCNC: 3 MMOL/L (ref 0–18)
AST SERPL-CCNC: 6 U/L (ref 15–37)
BASOPHILS # BLD AUTO: 0.04 X10(3) UL (ref 0–0.2)
BASOPHILS NFR BLD AUTO: 0.6 %
BILIRUB SERPL-MCNC: 0.4 MG/DL (ref 0.1–2)
BUN BLD-MCNC: 24 MG/DL (ref 9–23)
CALCIUM BLD-MCNC: 8.2 MG/DL (ref 8.5–10.1)
CHLORIDE SERPL-SCNC: 112 MMOL/L (ref 98–112)
CO2 SERPL-SCNC: 27 MMOL/L (ref 21–32)
CREAT BLD-MCNC: 0.96 MG/DL
EGFRCR SERPLBLD CKD-EPI 2021: 84 ML/MIN/1.73M2 (ref 60–?)
EOSINOPHIL # BLD AUTO: 0.38 X10(3) UL (ref 0–0.7)
EOSINOPHIL NFR BLD AUTO: 6 %
ERYTHROCYTE [DISTWIDTH] IN BLOOD BY AUTOMATED COUNT: 12.3 %
GLOBULIN PLAS-MCNC: 3.4 G/DL (ref 2.8–4.4)
GLUCOSE BLD-MCNC: 142 MG/DL (ref 70–99)
GLUCOSE BLD-MCNC: 175 MG/DL (ref 70–99)
GLUCOSE BLD-MCNC: 183 MG/DL (ref 70–99)
GLUCOSE BLD-MCNC: 193 MG/DL (ref 70–99)
GLUCOSE BLD-MCNC: 206 MG/DL (ref 70–99)
HCT VFR BLD AUTO: 31.4 %
HGB BLD-MCNC: 10.3 G/DL
IMM GRANULOCYTES # BLD AUTO: 0.03 X10(3) UL (ref 0–1)
IMM GRANULOCYTES NFR BLD: 0.5 %
LYMPHOCYTES # BLD AUTO: 0.86 X10(3) UL (ref 1–4)
LYMPHOCYTES NFR BLD AUTO: 13.7 %
MCH RBC QN AUTO: 30.5 PG (ref 26–34)
MCHC RBC AUTO-ENTMCNC: 32.8 G/DL (ref 31–37)
MCV RBC AUTO: 92.9 FL
MONOCYTES # BLD AUTO: 0.67 X10(3) UL (ref 0.1–1)
MONOCYTES NFR BLD AUTO: 10.6 %
NEUTROPHILS # BLD AUTO: 4.32 X10 (3) UL (ref 1.5–7.7)
NEUTROPHILS # BLD AUTO: 4.32 X10(3) UL (ref 1.5–7.7)
NEUTROPHILS NFR BLD AUTO: 68.6 %
OSMOLALITY SERPL CALC.SUM OF ELEC: 303 MOSM/KG (ref 275–295)
PLATELET # BLD AUTO: 230 10(3)UL (ref 150–450)
POTASSIUM SERPL-SCNC: 4.6 MMOL/L (ref 3.5–5.1)
PROT SERPL-MCNC: 6.5 G/DL (ref 6.4–8.2)
RBC # BLD AUTO: 3.38 X10(6)UL
SODIUM SERPL-SCNC: 142 MMOL/L (ref 136–145)
WBC # BLD AUTO: 6.3 X10(3) UL (ref 4–11)

## 2024-02-10 PROCEDURE — 99232 SBSQ HOSP IP/OBS MODERATE 35: CPT | Performed by: STUDENT IN AN ORGANIZED HEALTH CARE EDUCATION/TRAINING PROGRAM

## 2024-02-10 PROCEDURE — 99222 1ST HOSP IP/OBS MODERATE 55: CPT | Performed by: STUDENT IN AN ORGANIZED HEALTH CARE EDUCATION/TRAINING PROGRAM

## 2024-02-10 NOTE — PROGRESS NOTES
Patient transferred via bed from SSU2 overflow. Oriented to room. Safety precautions initiated. Call light in reach.

## 2024-02-10 NOTE — PROGRESS NOTES
A+Ox4  RA  No tele ordered  R foot diabetic ulcer. Wound culture pending.  Patient currently in MRI  Accucheck QID  IV vanco/IV cefepime for R foot ulcer  PRN norco for pain  Safety/fall precautions in place    0520 Gauze and tape applied to R plantar foot diabetic ulcer. No drainage present. No c/o pain this AM. All needs met.

## 2024-02-10 NOTE — PLAN OF CARE
NURSING ADMISSION NOTE      Patient admitted via Cart  Oriented to room.  Safety precautions initiated.  Bed in low position.  Call light in reach.      Assumed pt care at 1615.  Pt is A&Ox4, following commands.   Pt on room air, VSS.  NSR on tele.  Pt c/o pain. Norco given for pain.  Pt one person assist.  Pt on carb control diet. Tolerating diet.   L AC  saline locked.  Bed in lowest position, call light in reach.

## 2024-02-10 NOTE — PLAN OF CARE
Assumed care of patient at 0700.  Denies chest pain, sob, lightheadedness, dizziness.   Right wound changed and elevated.   Dr. Richardson spoke with patient and pt will have a partial amputation on Monday around 11am.     1645: REPORT GIVEN TO ORTHO RN     Problem: Diabetes/Glucose Control  Goal: Glucose maintained within prescribed range  Description: INTERVENTIONS:  - Monitor Blood Glucose as ordered  - Assess for signs and symptoms of hyperglycemia and hypoglycemia  - Administer ordered medications to maintain glucose within target range  - Assess barriers to adequate nutritional intake and initiate nutrition consult as needed  - Instruct patient on self management of diabetes  Outcome: Progressing     Problem: Patient/Family Goals  Goal: Patient/Family Long Term Goal  Description: Patient's Long Term Goal:     Interventions:  -   - See additional Care Plan goals for specific interventions  Outcome: Progressing  Goal: Patient/Family Short Term Goal  Description: Patient's Short Term Goal:     Interventions:   -   - See additional Care Plan goals for specific interventions  Outcome: Progressing

## 2024-02-10 NOTE — CONSULTS
[facility]  Consultation    Roberto Oviedo Patient Status:  Inpatient    1950 MRN [id number]   Location [facility] 0SW-A Attending Sourav Katz DO   Hosp Day # 1 PCP Jhonathan Freitas MD     IP Consult to Cardiology  Consult performed by: Severino Elias PA-C  Consult ordered by: Sourav Katz DO        Reason for Consultation:  Preoperative cardiac risk assesment    History of Present Illness:  Roberto Oviedo is a a(n) 73 year old male who moved here from [place] two years ago.  He has a PMH of CAD s/p CABG x4 in , NSTEMI approximately 5 years ago with medical therapy advised at [facility] in [facility], PAD, DM, HLD, HTN. He does not recall a history of CHF or arrhythmia. He has done well since the move without chest pain or cardiac symptoms and has not followed with a cardiologist in [place].    Recently he was sent to the ED for ulceration of R lateral foot with probing to bone and MRI supports osteomyelitis. We have been asked to provide risk stratification from a cardiac perspective prior to surgical intervention.    Denies dyspnea, chest pain, palpitations, edema, dizziness, syncope, or cardiac symptoms ambulating two flights of stairs.  History:  Past Medical History:   Diagnosis Date    Diabetes (HCC)     Essential hypertension     Heart attack (HCC)      Past Surgical History:   Procedure Laterality Date    EYE SURGERY  2022    OPEN HEART SURGICAL PROFILE       History reviewed. No pertinent family history.   reports that he has never smoked. He has never used smokeless tobacco. He reports that he does not currently use alcohol. He reports that he does not use drugs.    Allergies:  No Known Allergies    Medications:    Current Facility-Administered Medications:     ampicillin-sulbactam (Unasyn) 3 g in sodium chloride 0.9% 100mL IVPB-ADD, 3 g, Intravenous, q6h    benzocaine-menthol (Cepacol) lozenge 1 lozenge, 1 lozenge, Oral, PRN    clopidogrel (Plavix)  tab 75 mg, 75 mg, Oral, Daily    lisinopril (Prinivil; Zestril) tab 20 mg, 20 mg, Oral, Daily    sertraline (Zoloft) tab 100 mg, 100 mg, Oral, Daily    rosuvastatin (Crestor) tab 40 mg, 40 mg, Oral, Daily    gabapentin (Neurontin) cap 300 mg, 300 mg, Oral, Nightly    glucose (Dex4) 15 GM/59ML oral liquid 15 g, 15 g, Oral, Q15 Min PRN **OR** glucose (Glutose) 40% oral gel 15 g, 15 g, Oral, Q15 Min PRN **OR** glucose-vitamin C (Dex-4) chewable tab 4 tablet, 4 tablet, Oral, Q15 Min PRN **OR** dextrose 50% injection 50 mL, 50 mL, Intravenous, Q15 Min PRN **OR** glucose (Dex4) 15 GM/59ML oral liquid 30 g, 30 g, Oral, Q15 Min PRN **OR** glucose (Glutose) 40% oral gel 30 g, 30 g, Oral, Q15 Min PRN **OR** glucose-vitamin C (Dex-4) chewable tab 8 tablet, 8 tablet, Oral, Q15 Min PRN    enoxaparin (Lovenox) 40 MG/0.4ML SUBQ injection 40 mg, 40 mg, Subcutaneous, Daily    acetaminophen (Tylenol Extra Strength) tab 500 mg, 500 mg, Oral, Q4H PRN    ondansetron (Zofran) 4 MG/2ML injection 4 mg, 4 mg, Intravenous, Q6H PRN    metoclopramide (Reglan) 5 mg/mL injection 10 mg, 10 mg, Intravenous, Q8H PRN    polyethylene glycol (PEG 3350) (Miralax) 17 g oral packet 17 g, 17 g, Oral, Daily PRN    sennosides (Senokot) tab 17.2 mg, 17.2 mg, Oral, Nightly PRN    bisacodyl (Dulcolax) 10 MG rectal suppository 10 mg, 10 mg, Rectal, Daily PRN    fleet enema (Fleet) 7-19 GM/118ML rectal enema 133 mL, 1 enema, Rectal, Once PRN    insulin aspart (NovoLOG) 100 Units/mL FlexPen 1-10 Units, 1-10 Units, Subcutaneous, TID AC and HS    insulin aspart (NovoLOG) 100 Units/mL FlexPen 1-68 Units, 1-68 Units, Subcutaneous, TID CC    HYDROcodone-acetaminophen (Norco) 5-325 MG per tab 1 tablet, 1 tablet, Oral, Q4H PRN **OR** HYDROcodone-acetaminophen (Norco) 5-325 MG per tab 2 tablet, 2 tablet, Oral, Q4H PRN    morphINE PF 4 MG/ML injection 0.52 mg, 0.52 mg, Intravenous, Q2H PRN **OR** morphINE PF 4 MG/ML injection 1 mg, 1 mg, Intravenous, Q2H PRN **OR**  morphINE PF 4 MG/ML injection 2 mg, 2 mg, Intravenous, Q2H PRN    Review of Systems:  A comprehensive 10 point review of systems was completed.  Pertinent positives and negatives noted in the the HPI.     OBJECTIVE  Blood pressure 153/56, pulse 50, temperature 98 °F (36.7 °C), temperature source Oral, resp. rate 18, height 5' 10\" (1.778 m), weight 155 lb (70.3 kg), SpO2 100%.  Temp (24hrs), Av °F (36.7 °C), Min:97.5 °F (36.4 °C), Max:98.2 °F (36.8 °C)    Wt Readings from Last 3 Encounters:   24 155 lb (70.3 kg)   23 152 lb (68.9 kg)   22 144 lb (65.3 kg)     Physical Exam:   /56 (BP Location: Left arm)   Pulse 50   Temp 98 °F (36.7 °C) (Oral)   Resp 18   Ht 5' 10\" (1.778 m)   Wt 155 lb (70.3 kg)   SpO2 100%   BMI 22.24 kg/m²   Temp (24hrs), Av °F (36.7 °C), Min:97.5 °F (36.4 °C), Max:98.2 °F (36.8 °C)       Intake/Output Summary (Last 24 hours) at 2/10/2024 1531  Last data filed at 2/10/2024 1200  Gross per 24 hour   Intake 100 ml   Output 1400 ml   Net -1300 ml     Wt Readings from Last 3 Encounters:   24 155 lb (70.3 kg)   23 152 lb (68.9 kg)   22 144 lb (65.3 kg)       General: Elderly appearing male who is alert and oriented in no apparent distress seated comfortably bedside.  HEENT: No focal deficits.  Neck: No JVD, carotids 2+ no bruits.  Cardiac: Regular rate and rhythm, S1, S2 normal, no murmur, rub or gallop.  Lungs: Clear without wheezes, rales, rhonchi or dullness.  Normal excursions and effort.  Abdomen: Soft, non-tender.   Extremities: Without clubbing, cyanosis or edema.  Peripheral pulses are palpable. RLE is erythematous with plantar ulceration noted.  Neurologic: Alert and oriented, normal affect.  Skin: Warm and dry.          Diagnostics History:  EKG: not completed            Results:   Recent Labs   Lab 24  1332 02/10/24  0801   * 193*   BUN 21 24*   CREATSERUM 1.14 0.96   EGFRCR 68 84   CA 9.0 8.2*    142   K 4.4 4.6     112   CO2 27.0 27.0     Recent Labs   Lab 02/09/24  1332 02/10/24  0801   RBC 3.61* 3.38*   HGB 11.0* 10.3*   HCT 33.2* 31.4*   MCV 92.0 92.9   MCH 30.5 30.5   MCHC 33.1 32.8   RDW 12.2 12.3   NEPRELIM 7.35 4.32   WBC 9.8 6.3   .0 230.0         No results for input(s): \"BNP\" in the last 168 hours.  Lab Results   Component Value Date    INR 1.03 10/04/2021     No results found for: \"TROP\"      MRI FOOT, RIGHT (CPT=73718)    Result Date: 2/9/2024  CONCLUSION:  1. The exam is suboptimal due to extensive patient motion causing artifacts and image quality degradation. 2. Evidence of osteomyelitis involving the 5th metatarsal head , extending to involve the mid to distal 5th metatarsal shaft as well. 3. Evidence of osteomyelitis involving the 2nd distal phalanx.   LOCATION:  [facility]   Dictated by (CST): Jose Reyes DO on 2/09/2024 at 10:11 PM     Finalized by (CST): Jose Reyes DO on 2/09/2024 at 10:15 PM           Assessment and Plan  R foot osteomyelitis w/ partial fifth ray amputation planned  Obtain ECG  Obtain echocardiogram to clarify LVEF and evaluate for RWMA  The patient reports the ability to ambulate 4 METs without anginal symptoms   RCIS risk 6% and LIU TABBY score of 1.3%  Elevated cardiac risk given comorbid conditions;however, acceptable for procedure as risks explained by performing physician, given benefit of infection source control.  Hx of CAD s/p 4 vessel CABG in 2008 unknown anatomy  On Plavix and statin therapy  Hx of NSTEMI approximately 5 years ago  Will request cath report from this time  No indication of ACS  PAD  Evaluated by vascular surgery  HTN  BP has been at goal except for a recent elevation. Could up titrate lisinopril if consistently above goal of 130 systolic  HLD  Continue high intensity statin  DM II    PLAN  ECG and echocardiogram  Intermediate risk given cardiac comorbities and will await testing above; however, given the necessity for source control it is likely  reasonable to proceed with surgery.  Obtain outpatient cath records from [place]  Continue Plavix unless surgical team requires it held  Continue statin therapy  Discussed with the patient and his daughter over the phone at his request in detail      Severino Elias PA-C  2/10/2024  2:59 PM       Patient seen and examined independently.  Note reviewed and labs reviewed. Agree with above assessment and plan.  73-year-old male who presented with concern of right foot wound who had an MRI completed noting right foot osteomyelitis with fifth partial ray amputation planned via podiatry evaluation.  Vascular surgery evaluation was also completed with no plans of angiographic evaluation as patient has palpable pulses in his right foot.  Cardiology was consulted for preprocedure evaluation given history of CABG.  Patient has traveled here from [place] and does not recall details of procedure.  Reportedly he is status post four-vessel bypass in 2008 and then had a history of an NSTEMI approximately 4-5 years ago requiring PCI.  Since then he has not had strong cardiology follow-up.  Patient however states that he does not have any chest pain, shortness of breath with ambulating/exertion however, this is limited by foot pain due to wound.  Patient is at elevated risk for procedure due to comorbid conditions as well as lack of follow-up as of late and decreased ability to accurately determine METS of activity, however this risk should be acceptable given benefit of resolution of osteomyelitis.  Will order complete echocardiogram and EKG in the interim.  Will request transfer of medical records.        Fadi Katz DO  Cardiologist  [facility]  2/10/2024 5:15 PM      Note to the patient: The 21st Century Cures Act makes medical notes like these available to patients in the interest of transparency. However, be advised that this is a medical document. It is intended as peer to peer communication. It is written in  medical language and may contain abbreviations or verbiage that are unfamiliar. It may appear blunt or direct. Medical documents are intended to carry relevant information, facts as evident, and clinical opinion of the practitioner.     Disclaimer: Components of this note were documented using voice recognition system and are subject to errors not corrected at proofreading. Contact the author of this note for any clarifications.

## 2024-02-10 NOTE — CONSULTS
Vascular Surgery Consultation    Roberto Oviedo Patient Status:  Inpatient    1950 MRN [id number]   Location [facility] 0-A Attending Sourav Katz, DO   Hosp Day # 1 PCP Jhonathan Freitas MD     Reason for Consultation:  Osteomyelitis right fifth toe    History of Present Illness:  Roberto Oviedo is a a(n) 73 year old male who presents osteomyelitis of right fifth toe.  Wound probes to bone.  Admitted through the emergency room yesterday.  He is known to our practice for previous angiogram of the right lower extremity.    History:  Past Medical History:   Diagnosis Date    Diabetes (HCC)     Essential hypertension     Heart attack (HCC)      Past Surgical History:   Procedure Laterality Date    EYE SURGERY  2022    OPEN HEART SURGICAL PROFILE       History reviewed. No pertinent family history.   reports that he has never smoked. He has never used smokeless tobacco. He reports that he does not currently use alcohol. He reports that he does not use drugs.    Allergies:  No Known Allergies    Medications:    Current Facility-Administered Medications:     ampicillin-sulbactam (Unasyn) 3 g in sodium chloride 0.9% 100mL IVPB-ADD, 3 g, Intravenous, q6h    clopidogrel (Plavix) tab 75 mg, 75 mg, Oral, Daily    lisinopril (Prinivil; Zestril) tab 20 mg, 20 mg, Oral, Daily    sertraline (Zoloft) tab 100 mg, 100 mg, Oral, Daily    rosuvastatin (Crestor) tab 40 mg, 40 mg, Oral, Daily    gabapentin (Neurontin) cap 300 mg, 300 mg, Oral, Nightly    glucose (Dex4) 15 GM/59ML oral liquid 15 g, 15 g, Oral, Q15 Min PRN **OR** glucose (Glutose) 40% oral gel 15 g, 15 g, Oral, Q15 Min PRN **OR** glucose-vitamin C (Dex-4) chewable tab 4 tablet, 4 tablet, Oral, Q15 Min PRN **OR** dextrose 50% injection 50 mL, 50 mL, Intravenous, Q15 Min PRN **OR** glucose (Dex4) 15 GM/59ML oral liquid 30 g, 30 g, Oral, Q15 Min PRN **OR** glucose (Glutose) 40% oral gel 30 g, 30 g, Oral, Q15 Min PRN **OR** glucose-vitamin C (Dex-4)  chewable tab 8 tablet, 8 tablet, Oral, Q15 Min PRN    enoxaparin (Lovenox) 40 MG/0.4ML SUBQ injection 40 mg, 40 mg, Subcutaneous, Daily    acetaminophen (Tylenol Extra Strength) tab 500 mg, 500 mg, Oral, Q4H PRN    ondansetron (Zofran) 4 MG/2ML injection 4 mg, 4 mg, Intravenous, Q6H PRN    metoclopramide (Reglan) 5 mg/mL injection 10 mg, 10 mg, Intravenous, Q8H PRN    polyethylene glycol (PEG 3350) (Miralax) 17 g oral packet 17 g, 17 g, Oral, Daily PRN    sennosides (Senokot) tab 17.2 mg, 17.2 mg, Oral, Nightly PRN    bisacodyl (Dulcolax) 10 MG rectal suppository 10 mg, 10 mg, Rectal, Daily PRN    fleet enema (Fleet) 7-19 GM/118ML rectal enema 133 mL, 1 enema, Rectal, Once PRN    insulin aspart (NovoLOG) 100 Units/mL FlexPen 1-10 Units, 1-10 Units, Subcutaneous, TID AC and HS    insulin aspart (NovoLOG) 100 Units/mL FlexPen 1-68 Units, 1-68 Units, Subcutaneous, TID CC    HYDROcodone-acetaminophen (Norco) 5-325 MG per tab 1 tablet, 1 tablet, Oral, Q4H PRN **OR** HYDROcodone-acetaminophen (Norco) 5-325 MG per tab 2 tablet, 2 tablet, Oral, Q4H PRN    morphINE PF 4 MG/ML injection 0.52 mg, 0.52 mg, Intravenous, Q2H PRN **OR** morphINE PF 4 MG/ML injection 1 mg, 1 mg, Intravenous, Q2H PRN **OR** morphINE PF 4 MG/ML injection 2 mg, 2 mg, Intravenous, Q2H PRN    Review of Systems:    CONSTITUTIONAL: denies fever, chills  ENT: denies sore throat, nasal drainage/congestion  CHEST/CVS: denies cough, sputum, trouble breathing/SOB, chest pain, DANIEL  GI: denies abdominal pain, heartburn, diarrhea, blood in bm, tarry bm, constipation,    : denies difficulty urinating, pain, blood in urine, or frequency  SKIN: denies any unusual skin lesions or rashes  MUSCULOSKELETAL: denies back pain, joint pain, leg swelling  NEURO/EYES: denies headaches, passing out, motor dysfunction, difficulty walking, difficulty with speech, temporary blindness, double vision, confusion    Physical Exam:  /50 (BP Location: Left arm)   Pulse (!) 47    Temp 97.5 °F (36.4 °C) (Oral)   Resp 20   Ht 5' 10\" (1.778 m)   Wt 155 lb (70.3 kg)   SpO2 100%   BMI 22.24 kg/m²   GENERAL: alert and orientated X 3, well developed, well nourished, in no apparent distress  HEENT: ears and throat are clear  NECK: supple, no lymphadenopathy, thyroid wnl  CAROTID: No bruits  RESPIRATORY: no rales, rhonchi, or wheezes B  CARDIO: RRR without murmur, no murmur, no gallop   ABDOMEN: soft, non-tender with no palpable aneurysm or masses  BACK: normal, no tenderness  SKIN: no rashes, no suspicious lesions, warm and dry  EXTREMITIES: no edema, full range of motion, no tenderness  NEURO/PSYCH: orientated x3, normal mood and affect, no sensory or motor deficit    ARTERIAL VASCULAR EXAM    LOWER EXTREMITY     FEMORAL POPLITEAL POST TIB ANT TIB PERONEAL   RIGHT 3 2 2 2         LEFT 3 2 1 2      Palpable pedal pulses with some mild edema    Laboratory Data:  Lab Results   Component Value Date    WBC 6.3 02/10/2024    HGB 10.3 02/10/2024    HCT 31.4 02/10/2024    .0 02/10/2024    CREATSERUM 0.96 02/10/2024    BUN 24 02/10/2024     02/10/2024    K 4.6 02/10/2024     02/10/2024    CO2 27.0 02/10/2024     02/10/2024    CA 8.2 02/10/2024    ALB 3.1 02/10/2024    ALKPHO 101 02/10/2024    BILT 0.4 02/10/2024    TP 6.5 02/10/2024    AST 6 02/10/2024    ALT 12 02/10/2024    PGLU 206 02/10/2024       Impression and Plan:  Osteomyelitis right fifth toe-patient can proceed with podiatry intervention.  He has palpable pedal pulses.  Would recommend elevation to decrease some of the edema that he has prior to podiatry surgery.   Thank you for allowing me to participate in the care of your patient.    Narciso Kenney MD  2/10/2024  9:38 AM

## 2024-02-10 NOTE — CONSULTS
[facility]    Report of Consultation    Roberto Oviedo Patient Status:  Inpatient    1950 MRN [id number]   Location [facility] 0SW-A Attending Sourav Katz, DO   Hosp Day # 1 PCP Jhonathan Freitas MD     Date of Admission:  2024  Date of Consult:  2/10/2024    Reason for Consultation:  Right foot osteomyelitis    History of Present Illness:  Roberto Oviedo is a a(n) 73 year old male with medical history of diabetes and PAD was seen at this afternoon for evaluation of right foot osteomyelitis.  Patient is well-known to me as I performed second toe amputation of his left foot in the past for similar condition.  He was evaluated by me in our office yesterday and noted to have worsening wound that probed to bone with x-ray findings consistent with osteomyelitis.  He has completed MRI which confirms osteomyelitis to fifth metatarsal of his right foot.  He has also been evaluated by vascular surgery and no vascular intervention is planned.  He is without complaints at this time.    History:  Past Medical History:   Diagnosis Date    Diabetes (HCC)     Essential hypertension     Heart attack (HCC)      Past Surgical History:   Procedure Laterality Date    EYE SURGERY  2022    OPEN HEART SURGICAL PROFILE       History reviewed. No pertinent family history.   reports that he has never smoked. He has never used smokeless tobacco. He reports that he does not currently use alcohol. He reports that he does not use drugs.    Allergies:  No Known Allergies    Medications:    Current Facility-Administered Medications:     ampicillin-sulbactam (Unasyn) 3 g in sodium chloride 0.9% 100mL IVPB-ADD, 3 g, Intravenous, q6h    clopidogrel (Plavix) tab 75 mg, 75 mg, Oral, Daily    lisinopril (Prinivil; Zestril) tab 20 mg, 20 mg, Oral, Daily    sertraline (Zoloft) tab 100 mg, 100 mg, Oral, Daily    rosuvastatin (Crestor) tab 40 mg, 40 mg, Oral, Daily    gabapentin (Neurontin) cap 300 mg, 300 mg, Oral, Nightly     glucose (Dex4) 15 GM/59ML oral liquid 15 g, 15 g, Oral, Q15 Min PRN **OR** glucose (Glutose) 40% oral gel 15 g, 15 g, Oral, Q15 Min PRN **OR** glucose-vitamin C (Dex-4) chewable tab 4 tablet, 4 tablet, Oral, Q15 Min PRN **OR** dextrose 50% injection 50 mL, 50 mL, Intravenous, Q15 Min PRN **OR** glucose (Dex4) 15 GM/59ML oral liquid 30 g, 30 g, Oral, Q15 Min PRN **OR** glucose (Glutose) 40% oral gel 30 g, 30 g, Oral, Q15 Min PRN **OR** glucose-vitamin C (Dex-4) chewable tab 8 tablet, 8 tablet, Oral, Q15 Min PRN    enoxaparin (Lovenox) 40 MG/0.4ML SUBQ injection 40 mg, 40 mg, Subcutaneous, Daily    acetaminophen (Tylenol Extra Strength) tab 500 mg, 500 mg, Oral, Q4H PRN    ondansetron (Zofran) 4 MG/2ML injection 4 mg, 4 mg, Intravenous, Q6H PRN    metoclopramide (Reglan) 5 mg/mL injection 10 mg, 10 mg, Intravenous, Q8H PRN    polyethylene glycol (PEG 3350) (Miralax) 17 g oral packet 17 g, 17 g, Oral, Daily PRN    sennosides (Senokot) tab 17.2 mg, 17.2 mg, Oral, Nightly PRN    bisacodyl (Dulcolax) 10 MG rectal suppository 10 mg, 10 mg, Rectal, Daily PRN    fleet enema (Fleet) 7-19 GM/118ML rectal enema 133 mL, 1 enema, Rectal, Once PRN    insulin aspart (NovoLOG) 100 Units/mL FlexPen 1-10 Units, 1-10 Units, Subcutaneous, TID AC and HS    insulin aspart (NovoLOG) 100 Units/mL FlexPen 1-68 Units, 1-68 Units, Subcutaneous, TID CC    HYDROcodone-acetaminophen (Norco) 5-325 MG per tab 1 tablet, 1 tablet, Oral, Q4H PRN **OR** HYDROcodone-acetaminophen (Norco) 5-325 MG per tab 2 tablet, 2 tablet, Oral, Q4H PRN    morphINE PF 4 MG/ML injection 0.52 mg, 0.52 mg, Intravenous, Q2H PRN **OR** morphINE PF 4 MG/ML injection 1 mg, 1 mg, Intravenous, Q2H PRN **OR** morphINE PF 4 MG/ML injection 2 mg, 2 mg, Intravenous, Q2H PRN    Review of Systems: Denies nausea, vomiting, fever, chills.      Physical Exam:    Derm: Full-thickness ulceration noted subright fifth metatarsal head that measures 1 cm x 0.8 cm x 0.2 cm.  Base is  fibrogranular.  Today there is periwound erythema and wound is now probe to bone.  Periwound HPK noted.  HPK noted to left subfifth metatarsal as well.       Vascular: Dorsalis pedis and posterior tibial pulses are are lightly palpable bilaterally.  Feet are warm bilaterally.  CFT < 5 seconds to digits bilaterally.  1+ pitting edema noted bilaterally.     Neuro: Decreased protective sensation noted to bilateral lower extremities.     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 Muscle strength testing deferred.  No pain on palpation noted to ulcer site to right plantar fifth metatarsal head. Flexion contracture of digits present bilaterally.     Imaging:  MRI FOOT, RIGHT (CPT=73718)    Result Date: 2/9/2024  CONCLUSION:  1. The exam is suboptimal due to extensive patient motion causing artifacts and image quality degradation. 2. Evidence of osteomyelitis involving the 5th metatarsal head , extending to involve the mid to distal 5th metatarsal shaft as well. 3. Evidence of osteomyelitis involving the 2nd distal phalanx.   LOCATION:  [facility]   Dictated by (CST): Jose Reyes DO on 2/09/2024 at 10:11 PM     Finalized by (CST): Jose Reyes DO on 2/09/2024 at 10:15 PM         Laboratory Data:  Recent Labs   Lab 02/10/24  0801   RBC 3.38*   HGB 10.3*   HCT 31.4*   MCV 92.9   MCH 30.5   MCHC 32.8   RDW 12.3   NEPRELIM 4.32   WBC 6.3   .0       Impression and Plan:  Patient Active Problem List   Diagnosis    Type 2 diabetes mellitus without complication, without long-term current use of insulin ([facility])    PAD (peripheral artery disease) ([facility])    Peripheral neuropathic pain    Coronary artery disease involving native heart without angina pectoris    Hyperlipidemia associated with type 2 diabetes mellitus  (HCC)    Hypertension associated with diabetes  (HCC)    Moderate episode of recurrent major depressive disorder ([facility])    History of amputation of lesser toe, left ([facility])    Acute osteomyelitis of right foot ([facility])     Hyperglycemia       -Patient examined, chart history reviewed.  -Patient is afebrile without leukocytosis.  -X-rays today show findings consistent with osteomyelitis to fifth metatarsal head.  -MRI does show osteomyelitis of the fifth metatarsal head and shaft as well as possibly to the second distal phalanx.  Clinically there is no wound to the second distal phalanx site is very stable.  -Vascular eval appreciated--no intervention planned.  -Discussed treatment options with patient and daughter.  Discussed that I would recommend partial fifth ray amputation of right foot given the above findings.  Surgery was discussed in detail including benefits, risks, recovery period.    All treatment options have been discussed with the patient including both conservative and surgical attempts at correction. Potential risks and complications of surgical intervention were discussed at length which including but not not limited to death, loss of limb, post op pain, swelling, infection, bleeding, reoccurrence of the deformity, extended healing, and the possibility of further and future surgery.  No guarantees have been made to the patient and the informed consent has been signed.     -I would not recommend any intervention to second toe as the site is stable clinically and there is no wound.  Can continue to monitor.  -Wound cultures pending.  Infectious disease following patient.  -May benefit from cardiology clearance given open heart surgery in the past.  -Will continue to follow.  Partial fifth ray amputation tentatively scheduled for Monday at 11 AM.  Patient can be n.p.o. at midnight on Monday and anticoagulation to be held on Monday as well.      Jayesh Richardson DPM   2/10/2024  12:53 PM

## 2024-02-10 NOTE — PROGRESS NOTES
[facility]     Hospitalist Progress Note     Roberto Oviedo Patient Status:  Inpatient    1950 MRN [id number]   Location [facility] 0SW-A Attending Sourav Katz,    Hosp Day # 1 PCP Jhonathan Freitas MD     Chief Complaint: Right foot wound     Subjective:     Patient reports no pain in foot this morning     Objective:    Review of Systems:   A comprehensive review of systems was completed; pertinent positive and negatives stated in subjective.    Vital signs:  Temp:  [97.5 °F (36.4 °C)-98.3 °F (36.8 °C)] 97.5 °F (36.4 °C)  Pulse:  [47-85] 47  Resp:  [14-23] 20  BP: (107-155)/(50-77) 132/50  SpO2:  [97 %-100 %] 100 %    Physical Exam:    General: No acute distress  Respiratory: No wheezes, no rhonchi  Cardiovascular: S1, S2, regular rate and rhythm  Abdomen: Soft, Non-tender, non-distended, positive bowel sounds  Neuro: No new focal deficits.   Extremities: RLE lateral foot wound     Diagnostic Data:    Labs:  Recent Labs   Lab 24  1332 02/10/24  0801   WBC 9.8 6.3   HGB 11.0* 10.3*   MCV 92.0 92.9   .0 230.0       Recent Labs   Lab 24  1332 02/10/24  0801   * 193*   BUN 21 24*   CREATSERUM 1.14 0.96   CA 9.0 8.2*   ALB 3.5 3.1*    142   K 4.4 4.6    112   CO2 27.0 27.0   ALKPHO 119* 101   AST 10* 6*   ALT 13* 12*   BILT 0.4 0.4   TP 7.3 6.5       Estimated Creatinine Clearance: 69.2 mL/min (based on SCr of 0.96 mg/dL).    No results for input(s): \"TROP\", \"TROPHS\", \"CK\" in the last 168 hours.    No results for input(s): \"PTP\", \"INR\" in the last 168 hours.               Microbiology    No results found for this visit on 24.      Imaging: Reviewed in Epic.    Medications:    ampicillin-sulbactam  3 g Intravenous q6h    clopidogrel  75 mg Oral Daily    lisinopril  20 mg Oral Daily    sertraline  100 mg Oral Daily    rosuvastatin  40 mg Oral Daily    gabapentin  300 mg Oral Nightly    enoxaparin  40 mg Subcutaneous Daily    insulin aspart  1-10 Units  Subcutaneous TID AC and HS    insulin aspart  1-68 Units Subcutaneous TID CC       Assessment & Plan:      #Right fifth metatarsal osteomyelitis  #Right second phalanx osteomyelitis   #PVD  -MRI foot reviewed   -IV antibiotics   -ID following  -Vascular surgery following; recommend proceeding with podiatry intervention  -Podiatry consulted    #CAD  #Hypertension  #Hyperlipidemia  -Plavix, Lisinopril, Crestor      #Diabetes mellitus  -Hyperglycemia protocol       Sourav Katz DO    Supplementary Documentation:     Quality:  DVT Mechanical Prophylaxis:   SCDs,    DVT Pharmacologic Prophylaxis   Medication    enoxaparin (Lovenox) 40 MG/0.4ML SUBQ injection 40 mg                Code Status: Not on file  Gonsales: External urinary catheter in place  Gonsales Duration (in days):   Central line:    TIFFANY:     Discharge is dependent on: Clinical improvement   At this point Mr. Oviedo is expected to be discharge to: Home     The 21st Century Cures Act makes medical notes like these available to patients in the interest of transparency. Please be advised this is a medical document. Medical documents are intended to carry relevant information, facts as evident, and the clinical opinion of the practitioner. The medical note is intended as peer to peer communication and may appear blunt or direct. It is written in medical language and may contain abbreviations or verbiage that are unfamiliar.             **Certification      PHYSICIAN Certification of Need for Inpatient Hospitalization - Initial Certification    Patient will require inpatient services that will reasonably be expected to span two midnight's based on the clinical documentation in H+P.   Based on patients current state of illness, I anticipate that, after discharge, patient will require TBD.

## 2024-02-11 ENCOUNTER — ANESTHESIA EVENT (OUTPATIENT)
Dept: SURGERY | Facility: HOSPITAL | Age: 74
End: 2024-02-11
Payer: MEDICARE

## 2024-02-11 ENCOUNTER — APPOINTMENT (OUTPATIENT)
Dept: CV DIAGNOSTICS | Facility: HOSPITAL | Age: 74
End: 2024-02-11
Attending: PHYSICIAN ASSISTANT
Payer: MEDICARE

## 2024-02-11 LAB
ATRIAL RATE: 49 BPM
GLUCOSE BLD-MCNC: 117 MG/DL (ref 70–99)
GLUCOSE BLD-MCNC: 153 MG/DL (ref 70–99)
GLUCOSE BLD-MCNC: 169 MG/DL (ref 70–99)
GLUCOSE BLD-MCNC: 295 MG/DL (ref 70–99)
P AXIS: 78 DEGREES
P-R INTERVAL: 196 MS
Q-T INTERVAL: 488 MS
QRS DURATION: 136 MS
QTC CALCULATION (BEZET): 440 MS
R AXIS: -33 DEGREES
T AXIS: 73 DEGREES
VENTRICULAR RATE: 49 BPM

## 2024-02-11 PROCEDURE — 93306 TTE W/DOPPLER COMPLETE: CPT | Performed by: PHYSICIAN ASSISTANT

## 2024-02-11 PROCEDURE — 99232 SBSQ HOSP IP/OBS MODERATE 35: CPT | Performed by: STUDENT IN AN ORGANIZED HEALTH CARE EDUCATION/TRAINING PROGRAM

## 2024-02-11 NOTE — PROGRESS NOTES
Progress Note  Roberto Oviedo Patient Status:  Inpatient    1950 MRN [id number]   Location [facility] 3SW-A Attending Sourav Katz, DO   Hosp Day # 2 PCP Jhonathan Freitas MD     Subjective:  No acute events overnight.  Resting in bed this morning, denies any cardiac symptoms at this time.      Objective:  /68 (BP Location: Right arm)   Pulse 53   Temp 98 °F (36.7 °C) (Oral)   Resp 16   Ht 5' 10\" (1.778 m)   Wt 155 lb (70.3 kg)   SpO2 98%   BMI 22.24 kg/m²     Telemetry: SB, RBBB, HR 50s      Intake/Output:    Intake/Output Summary (Last 24 hours) at 2024 1047  Last data filed at 2024 0829  Gross per 24 hour   Intake 960 ml   Output 2770 ml   Net -1810 ml       Last 3 Weights   24 1625 155 lb (70.3 kg)   24 1324 155 lb (70.3 kg)   23 0837 152 lb (68.9 kg)   22 1028 144 lb (65.3 kg)       Labs:  Recent Labs   Lab 24  1332 02/10/24  0801   * 193*   BUN 21 24*   CREATSERUM 1.14 0.96   EGFRCR 68 84   CA 9.0 8.2*    142   K 4.4 4.6    112   CO2 27.0 27.0     Recent Labs   Lab 24  1332 02/10/24  0801   RBC 3.61* 3.38*   HGB 11.0* 10.3*   HCT 33.2* 31.4*   MCV 92.0 92.9   MCH 30.5 30.5   MCHC 33.1 32.8   RDW 12.2 12.3   NEPRELIM 7.35 4.32   WBC 9.8 6.3   .0 230.0         No results for input(s): \"TROP\", \"TROPHS\", \"CK\" in the last 168 hours.    Review of Systems   Constitutional: Negative.   Cardiovascular: Negative.    Respiratory: Negative.     Skin:  Positive for poor wound healing.       Physical Exam:    Gen: alert, oriented x 3, NAD  Heent: pupils equal, reactive. Mucous membranes moist.   Neck: no jvd  Cardiac: regular rate and rhythm, normal S1,S2, no murmur, gallop or rub   Lungs: CTA  Abd: soft, NT/ND +bs  Ext: no edema, right foot with erythema   Skin: Warm, dry  Neuro: No focal deficits      Medications:     ampicillin-sulbactam  3 g Intravenous q6h    clopidogrel  75 mg Oral Daily    lisinopril  20 mg Oral Daily     sertraline  100 mg Oral Daily    rosuvastatin  40 mg Oral Daily    gabapentin  300 mg Oral Nightly    enoxaparin  40 mg Subcutaneous Daily    insulin aspart  1-10 Units Subcutaneous TID AC and HS    insulin aspart  1-68 Units Subcutaneous TID CC     Assessment:  Right foot osteomyelitis  Wound cultures 2/10/24 positive for staph aureus   On IV abx per ID  Plan for fifth ray amputation on Monday 2.12/24  Hx of NSTEMI approximately 5 years ago  On Plavix, statin   CAD with hx of CABG x 4 in 2008  On statin, not on BB d/t SB    PVD  On Plavix, statin    HTN-controlled  On lisinopril   HLP-on statin   DM2    Plan:  Appears compensated cardiac status.  Continue current cardiac medications: lisinopril, plavix, statin.  Awaiting echo results.  Awaiting outpatient cath report from [place].   Continue abx per ID.  Tentative plan to proceed with right fifth ray amputation tomorrow with podiatry.       Plan of care discussed with patient, RN.    FIFI Russo  2/11/2024  10:47 AM  [phone]        Shared decision making completed.  Note reviewed and labs reviewed. Agree with above assessment and plan.  73-year-old male who presented with MRI confirming right fifth metatarsal osteomyelitis, right second phalanx osteomyelitis with planned fifth partial ray amputation by podiatry.  Vascular surgery following with no plans for angiographic reevaluation.  Patient has palpable pulses.  Cardiology was consulted for preprocedure evaluation.  Given prior history of CABG, echocardiogram was ordered with baseline LVEF of 55-60%.  Given comorbid conditions and lack of ability to determine METS of activity, patient will still be at an elevated risk but acceptable for planned surgery with risks/benefits as outlined by performing physician.  No current cardiopulmonary symptoms.  We will continue to follow.        Fadi Katz DO  Cardiologist  [facility]  2/11/2024 1:30 PM      Note to the patient: The 21st  Century Cures Act makes medical notes like these available to patients in the interest of transparency. However, be advised that this is a medical document. It is intended as peer to peer communication. It is written in medical language and may contain abbreviations or verbiage that are unfamiliar. It may appear blunt or direct. Medical documents are intended to carry relevant information, facts as evident, and clinical opinion of the practitioner.     Disclaimer: Components of this note were documented using voice recognition system and are subject to errors not corrected at proofreading. Contact the author of this note for any clarifications.

## 2024-02-11 NOTE — PROGRESS NOTES
Patient seen at bedside this afternoon. H+P from yesterday unchanged. Surgery planned for 11am tomorrow. Please have patient NPO at midnight and hold AC for tomorrow (okay to continue plavix).

## 2024-02-11 NOTE — PROGRESS NOTES
[facility]     Hospitalist Progress Note     Roberto Oviedo Patient Status:  Inpatient    1950 MRN [id number]   Location [facility] 0SW-A Attending Sourav Katz,    Hosp Day # 2 PCP Jhonathan Freitas MD     Chief Complaint: Right foot wound     Subjective:     Patient resting in bed with no complaints    Objective:    Review of Systems:   A comprehensive review of systems was completed; pertinent positive and negatives stated in subjective.    Vital signs:  Temp:  [98 °F (36.7 °C)-98.1 °F (36.7 °C)] 98 °F (36.7 °C)  Pulse:  [50-56] 50  Resp:  [14-16] 16  BP: (120-157)/(58-72) 121/72  SpO2:  [95 %-100 %] 100 %    Physical Exam:    General: No acute distress  Respiratory: No wheezes, no rhonchi  Cardiovascular: S1, S2, regular rate and rhythm  Abdomen: Soft, Non-tender, non-distended, positive bowel sounds  Neuro: No new focal deficits.   Extremities: RLE lateral foot wound     Diagnostic Data:    Labs:  Recent Labs   Lab 24  1332 02/10/24  0801   WBC 9.8 6.3   HGB 11.0* 10.3*   MCV 92.0 92.9   .0 230.0       Recent Labs   Lab 24  1332 02/10/24  0801   * 193*   BUN 21 24*   CREATSERUM 1.14 0.96   CA 9.0 8.2*   ALB 3.5 3.1*    142   K 4.4 4.6    112   CO2 27.0 27.0   ALKPHO 119* 101   AST 10* 6*   ALT 13* 12*   BILT 0.4 0.4   TP 7.3 6.5       Estimated Creatinine Clearance: 69.2 mL/min (based on SCr of 0.96 mg/dL).    No results for input(s): \"TROP\", \"TROPHS\", \"CK\" in the last 168 hours.    No results for input(s): \"PTP\", \"INR\" in the last 168 hours.               Microbiology    Hospital Encounter on 24   1. Aerobic Bacterial Culture     Status: Abnormal (Preliminary result)    Collection Time: 02/10/24 10:44 AM    Specimen: Foot; Other   Result Value Ref Range    Aerobic Culture Result 1+ growth Staphylococcus aureus (A) N/A    Aerobic Smear 2+ WBCs seen N/A    Aerobic Smear 3+ Gram positive cocci in pairs N/A   2. Blood Culture     Status: None (Preliminary  result)    Collection Time: 02/09/24  2:36 PM    Specimen: Blood,peripheral   Result Value Ref Range    Blood Culture Result No Growth 1 Day N/A         Imaging: Reviewed in Epic.    Medications:    ampicillin-sulbactam  3 g Intravenous q6h    clopidogrel  75 mg Oral Daily    lisinopril  20 mg Oral Daily    sertraline  100 mg Oral Daily    rosuvastatin  40 mg Oral Daily    gabapentin  300 mg Oral Nightly    enoxaparin  40 mg Subcutaneous Daily    insulin aspart  1-10 Units Subcutaneous TID AC and HS    insulin aspart  1-68 Units Subcutaneous TID CC       Assessment & Plan:      #Right fifth metatarsal osteomyelitis  #Right second phalanx osteomyelitis   #PVD  -MRI foot reviewed   -IV antibiotics   -ID following  -Vascular surgery following  -Podiatry following; plan for partial fifth ray amputation on Monday    #CAD  #Hypertension  #Hyperlipidemia  -Plavix, Lisinopril, Crestor  -Cardiology following     #Diabetes mellitus  -Hyperglycemia protocol       Sourav Katz DO    Supplementary Documentation:     Quality:  DVT Mechanical Prophylaxis:   SCDs,    DVT Pharmacologic Prophylaxis   Medication    enoxaparin (Lovenox) 40 MG/0.4ML SUBQ injection 40 mg                Code Status: Not on file  Gonsales: External urinary catheter in place  Gonsales Duration (in days):   Central line:    TIFFANY:     Discharge is dependent on: Clinical improvement   At this point Mr. Oviedo is expected to be discharge to: Home     The 21st Century Cures Act makes medical notes like these available to patients in the interest of transparency. Please be advised this is a medical document. Medical documents are intended to carry relevant information, facts as evident, and the clinical opinion of the practitioner. The medical note is intended as peer to peer communication and may appear blunt or direct. It is written in medical language and may contain abbreviations or verbiage that are unfamiliar.             **Certification      PHYSICIAN  Certification of Need for Inpatient Hospitalization - Initial Certification    Patient will require inpatient services that will reasonably be expected to span two midnight's based on the clinical documentation in H+P.   Based on patients current state of illness, I anticipate that, after discharge, patient will require TBD.

## 2024-02-11 NOTE — PLAN OF CARE
Patient A&O x4, room air, no c/o pain.  Patient has been SB most of the night while asleep and occasionally SP when awake.  Patient SBP has been 170+ if taken right after he wakes up but if you let him sit for a second before taking his SBP closer to 150.

## 2024-02-11 NOTE — PROGRESS NOTES
INFECTIOUS DISEASE  PROGRESS NOTE            [facility]    Roberto Oviedo Patient Status:  Inpatient    1950 MRN [id number]   [facility] 3SW-A Attending Sourav Katz,    Hosp Day # 2 PCP Jhonathan Freitas MD     Antibiotics: unsayn    Subjective:  : surgery tomorrow planned    Objective:  Temp:  [98 °F (36.7 °C)-98.1 °F (36.7 °C)] 98 °F (36.7 °C)  Pulse:  [50-56] 53  Resp:  [14-18] 16  BP: (120-157)/(56-68) 123/68  SpO2:  [95 %-100 %] 98 %    General: awake alert  Vital signs:Temp:  [98 °F (36.7 °C)-98.1 °F (36.7 °C)] 98 °F (36.7 °C)  Pulse:  [50-56] 53  Resp:  [14-18] 16  BP: (120-157)/(56-68) 123/68  SpO2:  [95 %-100 %] 98 %  HEENT: Moist mucous membranes. Extraocular muscles are intact.  Neck: supple no masses  Respiratory: Non labored, symmetric excursion, normal respirations  Cardiovascular: no irregularities in rhythm  Abdomen: Soft, nontender, nondistended.   Musculoskeletal: foot dressed  Labs:     COVID-19 Lab Results    COVID-19  Lab Results   Component Value Date    COVID19 Not Detected 2022    COVID19 Not Detected 2022    COVID19 Not Detected 2021       Pro-Calcitonin  No results for input(s): \"PCT\" in the last 168 hours.    Cardiac  No results for input(s): \"TROP\", \"PBNP\" in the last 168 hours.    Creatinine Kinase  No results for input(s): \"CK\" in the last 168 hours.    Inflammatory Markers  No results for input(s): \"CRP\", \"TANISHA\", \"LDH\", \"DDIMER\" in the last 168 hours.    Recent Labs   Lab 02/10/24  0801   RBC 3.38*   HGB 10.3*   HCT 31.4*   MCV 92.9   MCH 30.5   MCHC 32.8   RDW 12.3   NEPRELIM 4.32   WBC 6.3   .0         Recent Labs   Lab 24  1332 02/10/24  0801   * 193*   BUN 21 24*   CREATSERUM 1.14 0.96   CA 9.0 8.2*   ALB 3.5 3.1*    142   K 4.4 4.6    112   CO2 27.0 27.0   ALKPHO 119* 101   AST 10* 6*   ALT 13* 12*   BILT 0.4 0.4   TP 7.3 6.5       No results found for: \"VANCT\"  Microbiology    Hospital Encounter on  02/09/24   1. Aerobic Bacterial Culture     Status: Abnormal (Preliminary result)    Collection Time: 02/10/24 10:44 AM    Specimen: Foot; Other   Result Value Ref Range    Aerobic Culture Result 1+ growth Staphylococcus aureus (A) N/A    Aerobic Smear 2+ WBCs seen N/A    Aerobic Smear 3+ Gram positive cocci in pairs N/A   2. Blood Culture     Status: None (Preliminary result)    Collection Time: 02/09/24  2:36 PM    Specimen: Blood,peripheral   Result Value Ref Range    Blood Culture Result No Growth 1 Day N/A         Problem list reviewed:  Patient Active Problem List   Diagnosis    Type 2 diabetes mellitus without complication, without long-term current use of insulin (HCC)    PAD (peripheral artery disease) ([facility])    Peripheral neuropathic pain    Coronary artery disease involving native heart without angina pectoris    Hyperlipidemia associated with type 2 diabetes mellitus  ([facility])    Hypertension associated with diabetes  ([facility])    Moderate episode of recurrent major depressive disorder ([facility])    History of amputation of lesser toe, left ([facility])    Acute osteomyelitis of right foot ([facility])    Hyperglycemia             ASSESSMENT/PLAN:  1.  Ulceration right 5th MT, probes to bone and MRI evidence of osteomyelitis  -was debrided in podiatry office, no cultures in lab  Culture to be sent from bedside  Ho of MSSA     Culture from wound Staph aureus S pending  Adjust abx per micro  Await surgery per podiatry      Maximiliano Vizcarra MD, MD [facility]  [phone]

## 2024-02-11 NOTE — PLAN OF CARE
Alert and oriented x 4. Vitals stable on room air. Denies pain at this time.  Dressing clean, dry, intact, LLE elevated on pillows. Voiding without difficulty. Last BM 02/10. Tolerating diet.Tolerating IV antibiotic. Clarified Lovenox and Plavix adm with surgery. Hold Lovenox on the day of surgery. Fax sent to [place] for medical records. SCD's on bilaterally. Safety precautions in place.

## 2024-02-11 NOTE — ANESTHESIA PREPROCEDURE EVALUATION
PRE-OP EVALUATION    Patient Name: Roberto Oviedo    Admit Diagnosis: Hyperglycemia [R73.9]  Acute osteomyelitis of right foot (HCC) [M86.171]    Pre-op Diagnosis: Osteomyelitis (HCC) [M86.9]    PARTIAL 5TH. RAY  AMPUTATION - RIGHT    Anesthesia Procedure: PARTIAL 5TH. RAY  AMPUTATION - RIGHT (Right: Foot)    Surgeon(s) and Role:     * Jayesh Richardson DPM - Primary    Pre-op vitals reviewed.  Temp: 97.9 °F (36.6 °C)  Pulse: 48  Resp: 18  BP: 168/54  SpO2: 98 %  Body mass index is 22.24 kg/m².    Current medications reviewed.  Hospital Medications:   ceFAZolin (Ancef) 2 g in 20mL IV syringe premix  2 g Intravenous Q8H    metRONIDAZOLE (Flagyl) tab 500 mg  500 mg Oral 2 times per day    [MAR Hold] benzocaine-menthol (Cepacol) lozenge 1 lozenge  1 lozenge Oral PRN    [COMPLETED] vancomycin (Vancocin) 1.75 g in sodium chloride 0.9% 500mL IVPB premix  25 mg/kg Intravenous Once    [COMPLETED] ceFEPIme (Maxpime) 2 g in sodium chloride 0.9% 100 mL IVPB-MBP  2 g Intravenous Once    [MAR Hold] clopidogrel (Plavix) tab 75 mg  75 mg Oral Daily    [MAR Hold] lisinopril (Prinivil; Zestril) tab 20 mg  20 mg Oral Daily    [MAR Hold] sertraline (Zoloft) tab 100 mg  100 mg Oral Daily    [MAR Hold] rosuvastatin (Crestor) tab 40 mg  40 mg Oral Daily    [MAR Hold] gabapentin (Neurontin) cap 300 mg  300 mg Oral Nightly    [MAR Hold] glucose (Dex4) 15 GM/59ML oral liquid 15 g  15 g Oral Q15 Min PRN    Or    [MAR Hold] glucose (Glutose) 40% oral gel 15 g  15 g Oral Q15 Min PRN    Or    [MAR Hold] glucose-vitamin C (Dex-4) chewable tab 4 tablet  4 tablet Oral Q15 Min PRN    Or    [MAR Hold] dextrose 50% injection 50 mL  50 mL Intravenous Q15 Min PRN    Or    [MAR Hold] glucose (Dex4) 15 GM/59ML oral liquid 30 g  30 g Oral Q15 Min PRN    Or    [MAR Hold] glucose (Glutose) 40% oral gel 30 g  30 g Oral Q15 Min PRN    Or    [MAR Hold] glucose-vitamin C (Dex-4) chewable tab 8 tablet  8 tablet Oral Q15 Min PRN    [Held by provider] enoxaparin  (Lovenox) 40 MG/0.4ML SUBQ injection 40 mg  40 mg Subcutaneous Daily    [MAR Hold] acetaminophen (Tylenol Extra Strength) tab 500 mg  500 mg Oral Q4H PRN    [MAR Hold] ondansetron (Zofran) 4 MG/2ML injection 4 mg  4 mg Intravenous Q6H PRN    [MAR Hold] metoclopramide (Reglan) 5 mg/mL injection 10 mg  10 mg Intravenous Q8H PRN    [MAR Hold] polyethylene glycol (PEG 3350) (Miralax) 17 g oral packet 17 g  17 g Oral Daily PRN    [MAR Hold] sennosides (Senokot) tab 17.2 mg  17.2 mg Oral Nightly PRN    [MAR Hold] bisacodyl (Dulcolax) 10 MG rectal suppository 10 mg  10 mg Rectal Daily PRN    [MAR Hold] fleet enema (Fleet) 7-19 GM/118ML rectal enema 133 mL  1 enema Rectal Once PRN    [MAR Hold] insulin aspart (NovoLOG) 100 Units/mL FlexPen 1-10 Units  1-10 Units Subcutaneous TID AC and HS    [MAR Hold] insulin aspart (NovoLOG) 100 Units/mL FlexPen 1-68 Units  1-68 Units Subcutaneous TID CC    [MAR Hold] HYDROcodone-acetaminophen (Norco) 5-325 MG per tab 1 tablet  1 tablet Oral Q4H PRN    Or    [MAR Hold] HYDROcodone-acetaminophen (Norco) 5-325 MG per tab 2 tablet  2 tablet Oral Q4H PRN    [MAR Hold] morphINE PF 4 MG/ML injection 0.52 mg  0.52 mg Intravenous Q2H PRN    Or    [MAR Hold] morphINE PF 4 MG/ML injection 1 mg  1 mg Intravenous Q2H PRN    Or    [MAR Hold] morphINE PF 4 MG/ML injection 2 mg  2 mg Intravenous Q2H PRN       Outpatient Medications:     Medications Prior to Admission   Medication Sig Dispense Refill Last Dose    Melatonin 5 MG Oral Tab Take 1 tablet (5 mg total) by mouth at bedtime.   2/8/2024    LISINOPRIL 20 MG Oral Tab TAKE 1 TABLET BY MOUTH EVERY DAY 90 tablet 1 2/8/2024    METFORMIN 500 MG Oral Tab TAKE 2 TABLETS BY MOUTH TWICE A DAY WITH MEALS 360 tablet 0 2/9/2024    glimepiride 1 MG Oral Tab Take 1 tablet (1 mg total) by mouth daily with breakfast. *Due for labs and Annual physical, please call the office* 30 tablet 0 Past Week    sertraline 100 MG Oral Tab Take 1 tablet (100 mg total) by mouth  daily. 90 tablet 0 2/9/2024    clopidogrel 75 MG Oral Tab Take 1 tablet (75 mg total) by mouth daily. 90 tablet 0 2/9/2024    gabapentin 300 MG Oral Cap Take 1 capsule (300 mg total) by mouth nightly. 90 capsule 0 Past Month    rosuvastatin 40 MG Oral Tab Take 1 tablet (40 mg total) by mouth daily. 90 tablet 3 2/9/2024    Exenatide ER (BYDUREON) 2 MG Subcutaneous Pen-injector Inject 2 mg into the skin once a week.       Continuous Blood Gluc Sensor (FREESTYLE CLAUDE 2 SENSOR) Does not apply Misc 1 each every 14 (fourteen) days. One sensor every 14 days. 2 each 3        Allergies: Patient has no known allergies.      Anesthesia Evaluation    Patient summary reviewed.    Anesthetic Complications  (-) history of anesthetic complications         GI/Hepatic/Renal    Negative GI/hepatic/renal ROS.                             Cardiovascular  Comment: Echo  1. Left ventricle: The cavity size was normal. Wall thickness was normal.      Systolic function was normal. The estimated ejection fraction was 55-60%,      by visual assessment. Left ventricular diastolic function parameters were      normal for the patient's age.   2. Right ventricle: The cavity size was normal. Systolic function was      normal.   3. Left atrium: The left atrial volume was mildly increased.   4. Pericardium, extracardiac: There was no pericardial effusion.   Impressions:  No previous study from [facility] was   available for comparison.   *       ECG reviewed.  Exercise tolerance: good     MET: >4      (+) hypertension   (+) hyperlipidemia  (+) CAD                  (-) angina     (-) DANIEL         Endo/Other      (+) diabetes  type 2,                          Pulmonary    Negative pulmonary ROS.           (-) shortness of breath  (-) recent URI          Neuro/Psych    Negative neuro/psych ROS.  (+) depression                                Past Surgical History:   Procedure Laterality Date    EYE SURGERY  07/21/2022    OPEN HEART  SURGICAL PROFILE       Social History     Socioeconomic History    Marital status:    Tobacco Use    Smoking status: Never    Smokeless tobacco: Never   Vaping Use    Vaping Use: Never used   Substance and Sexual Activity    Alcohol use: Not Currently    Drug use: Never     History   Drug Use Unknown     Available pre-op labs reviewed.  Lab Results   Component Value Date    WBC 6.5 02/12/2024    RBC 3.37 (L) 02/12/2024    HGB 10.0 (L) 02/12/2024    HCT 30.2 (L) 02/12/2024    MCV 89.6 02/12/2024    MCH 29.7 02/12/2024    MCHC 33.1 02/12/2024    RDW 12.1 02/12/2024    .0 02/12/2024     Lab Results   Component Value Date     02/12/2024    K 4.3 02/12/2024     02/12/2024    CO2 26.0 02/12/2024    BUN 31 (H) 02/12/2024    CREATSERUM 1.00 02/12/2024     (H) 02/12/2024    CA 8.3 (L) 02/12/2024            Airway      Mallampati: II  Mouth opening: >3 FB  TM distance: 4 - 6 cm  Neck ROM: full Cardiovascular    Cardiovascular exam normal.         Dental  Comment: Denies loose, chipped, cracked teeth, Dentition grossly intact on exam  Poor dentition             Pulmonary    Pulmonary exam normal.                 Other findings              ASA: 3   Plan: MAC  NPO status verified and patient meets guidelines.    Post-procedure pain management plan discussed with surgeon and patient.    Comment: Spoke with pt and discussed risks of MAC including, conversion to GA with ETT, nausea, vomiting, injury to lips, gums, tongue, teeth, eyes, awareness under anesthesia, cardiac, pulmonary, aspiration, and stroke events, delayed emergence, allergic reaction, and death. Pt understands and consents to receiving anesthesia    Plan/risks discussed with: patient                Present on Admission:  **None**

## 2024-02-12 ENCOUNTER — ANESTHESIA (OUTPATIENT)
Dept: SURGERY | Facility: HOSPITAL | Age: 74
End: 2024-02-12
Payer: MEDICARE

## 2024-02-12 ENCOUNTER — APPOINTMENT (OUTPATIENT)
Dept: GENERAL RADIOLOGY | Facility: HOSPITAL | Age: 74
End: 2024-02-12
Attending: STUDENT IN AN ORGANIZED HEALTH CARE EDUCATION/TRAINING PROGRAM
Payer: MEDICARE

## 2024-02-12 LAB
ANION GAP SERPL CALC-SCNC: 3 MMOL/L (ref 0–18)
BUN BLD-MCNC: 31 MG/DL (ref 9–23)
CALCIUM BLD-MCNC: 8.3 MG/DL (ref 8.5–10.1)
CHLORIDE SERPL-SCNC: 109 MMOL/L (ref 98–112)
CO2 SERPL-SCNC: 26 MMOL/L (ref 21–32)
CREAT BLD-MCNC: 1 MG/DL
EGFRCR SERPLBLD CKD-EPI 2021: 80 ML/MIN/1.73M2 (ref 60–?)
ERYTHROCYTE [DISTWIDTH] IN BLOOD BY AUTOMATED COUNT: 12.1 %
GLUCOSE BLD-MCNC: 126 MG/DL (ref 70–99)
GLUCOSE BLD-MCNC: 253 MG/DL (ref 70–99)
GLUCOSE BLD-MCNC: 263 MG/DL (ref 70–99)
GLUCOSE BLD-MCNC: 272 MG/DL (ref 70–99)
GLUCOSE BLD-MCNC: 305 MG/DL (ref 70–99)
HCT VFR BLD AUTO: 30.2 %
HGB BLD-MCNC: 10 G/DL
MCH RBC QN AUTO: 29.7 PG (ref 26–34)
MCHC RBC AUTO-ENTMCNC: 33.1 G/DL (ref 31–37)
MCV RBC AUTO: 89.6 FL
OSMOLALITY SERPL CALC.SUM OF ELEC: 302 MOSM/KG (ref 275–295)
PLATELET # BLD AUTO: 240 10(3)UL (ref 150–450)
POTASSIUM SERPL-SCNC: 4.3 MMOL/L (ref 3.5–5.1)
RBC # BLD AUTO: 3.37 X10(6)UL
SODIUM SERPL-SCNC: 138 MMOL/L (ref 136–145)
WBC # BLD AUTO: 6.5 X10(3) UL (ref 4–11)

## 2024-02-12 PROCEDURE — 28810 AMPUTATION TOE & METATARSAL: CPT | Performed by: STUDENT IN AN ORGANIZED HEALTH CARE EDUCATION/TRAINING PROGRAM

## 2024-02-12 PROCEDURE — 99232 SBSQ HOSP IP/OBS MODERATE 35: CPT | Performed by: STUDENT IN AN ORGANIZED HEALTH CARE EDUCATION/TRAINING PROGRAM

## 2024-02-12 PROCEDURE — 0Y6M0ZF DETACHMENT AT RIGHT FOOT, PARTIAL 5TH RAY, OPEN APPROACH: ICD-10-PCS | Performed by: STUDENT IN AN ORGANIZED HEALTH CARE EDUCATION/TRAINING PROGRAM

## 2024-02-12 PROCEDURE — 76000 FLUOROSCOPY <1 HR PHYS/QHP: CPT | Performed by: STUDENT IN AN ORGANIZED HEALTH CARE EDUCATION/TRAINING PROGRAM

## 2024-02-12 RX ORDER — CEFAZOLIN SODIUM/WATER 2 G/20 ML
2 SYRINGE (ML) INTRAVENOUS EVERY 8 HOURS
Status: DISCONTINUED | OUTPATIENT
Start: 2024-02-12 | End: 2024-02-13

## 2024-02-12 RX ORDER — LIDOCAINE HYDROCHLORIDE 10 MG/ML
INJECTION, SOLUTION EPIDURAL; INFILTRATION; INTRACAUDAL; PERINEURAL AS NEEDED
Status: DISCONTINUED | OUTPATIENT
Start: 2024-02-12 | End: 2024-02-12 | Stop reason: SURG

## 2024-02-12 RX ORDER — ONDANSETRON 2 MG/ML
INJECTION INTRAMUSCULAR; INTRAVENOUS AS NEEDED
Status: DISCONTINUED | OUTPATIENT
Start: 2024-02-12 | End: 2024-02-12 | Stop reason: SURG

## 2024-02-12 RX ORDER — SODIUM CHLORIDE, SODIUM LACTATE, POTASSIUM CHLORIDE, CALCIUM CHLORIDE 600; 310; 30; 20 MG/100ML; MG/100ML; MG/100ML; MG/100ML
INJECTION, SOLUTION INTRAVENOUS CONTINUOUS
Status: DISCONTINUED | OUTPATIENT
Start: 2024-02-12 | End: 2024-02-12 | Stop reason: HOSPADM

## 2024-02-12 RX ORDER — ACETAMINOPHEN 500 MG
1000 TABLET ORAL ONCE AS NEEDED
Status: DISCONTINUED | OUTPATIENT
Start: 2024-02-12 | End: 2024-02-12 | Stop reason: HOSPADM

## 2024-02-12 RX ORDER — METOCLOPRAMIDE HYDROCHLORIDE 5 MG/ML
10 INJECTION INTRAMUSCULAR; INTRAVENOUS EVERY 8 HOURS PRN
Status: DISCONTINUED | OUTPATIENT
Start: 2024-02-12 | End: 2024-02-12 | Stop reason: HOSPADM

## 2024-02-12 RX ORDER — BUPIVACAINE HYDROCHLORIDE 5 MG/ML
INJECTION, SOLUTION EPIDURAL; INTRACAUDAL AS NEEDED
Status: DISCONTINUED | OUTPATIENT
Start: 2024-02-12 | End: 2024-02-12 | Stop reason: HOSPADM

## 2024-02-12 RX ORDER — METRONIDAZOLE 500 MG/1
500 TABLET ORAL EVERY 12 HOURS SCHEDULED
Status: DISCONTINUED | OUTPATIENT
Start: 2024-02-12 | End: 2024-02-13

## 2024-02-12 RX ORDER — DEXAMETHASONE SODIUM PHOSPHATE 4 MG/ML
VIAL (ML) INJECTION AS NEEDED
Status: DISCONTINUED | OUTPATIENT
Start: 2024-02-12 | End: 2024-02-12 | Stop reason: SURG

## 2024-02-12 RX ORDER — ONDANSETRON 2 MG/ML
4 INJECTION INTRAMUSCULAR; INTRAVENOUS EVERY 6 HOURS PRN
Status: DISCONTINUED | OUTPATIENT
Start: 2024-02-12 | End: 2024-02-12 | Stop reason: HOSPADM

## 2024-02-12 RX ORDER — NALOXONE HYDROCHLORIDE 0.4 MG/ML
80 INJECTION, SOLUTION INTRAMUSCULAR; INTRAVENOUS; SUBCUTANEOUS AS NEEDED
Status: DISCONTINUED | OUTPATIENT
Start: 2024-02-12 | End: 2024-02-12 | Stop reason: HOSPADM

## 2024-02-12 RX ORDER — SODIUM CHLORIDE, SODIUM LACTATE, POTASSIUM CHLORIDE, CALCIUM CHLORIDE 600; 310; 30; 20 MG/100ML; MG/100ML; MG/100ML; MG/100ML
INJECTION, SOLUTION INTRAVENOUS CONTINUOUS PRN
Status: DISCONTINUED | OUTPATIENT
Start: 2024-02-12 | End: 2024-02-12 | Stop reason: SURG

## 2024-02-12 RX ORDER — LIDOCAINE HYDROCHLORIDE 10 MG/ML
INJECTION, SOLUTION INFILTRATION; PERINEURAL AS NEEDED
Status: DISCONTINUED | OUTPATIENT
Start: 2024-02-12 | End: 2024-02-12 | Stop reason: HOSPADM

## 2024-02-12 RX ORDER — INSULIN ASPART 100 [IU]/ML
INJECTION, SOLUTION INTRAVENOUS; SUBCUTANEOUS ONCE
Status: DISCONTINUED | OUTPATIENT
Start: 2024-02-12 | End: 2024-02-12 | Stop reason: HOSPADM

## 2024-02-12 RX ORDER — HYDROCODONE BITARTRATE AND ACETAMINOPHEN 5; 325 MG/1; MG/1
2 TABLET ORAL ONCE AS NEEDED
Status: DISCONTINUED | OUTPATIENT
Start: 2024-02-12 | End: 2024-02-12 | Stop reason: HOSPADM

## 2024-02-12 RX ORDER — HYDROCODONE BITARTRATE AND ACETAMINOPHEN 5; 325 MG/1; MG/1
1 TABLET ORAL ONCE AS NEEDED
Status: DISCONTINUED | OUTPATIENT
Start: 2024-02-12 | End: 2024-02-12 | Stop reason: HOSPADM

## 2024-02-12 RX ADMIN — ONDANSETRON 4 MG: 2 INJECTION INTRAMUSCULAR; INTRAVENOUS at 11:48:00

## 2024-02-12 RX ADMIN — CEFAZOLIN SODIUM/WATER 2 G: 2 G/20 ML SYRINGE (ML) INTRAVENOUS at 11:47:00

## 2024-02-12 RX ADMIN — DEXAMETHASONE SODIUM PHOSPHATE 4 MG: 4 MG/ML VIAL (ML) INJECTION at 11:48:00

## 2024-02-12 RX ADMIN — LIDOCAINE HYDROCHLORIDE 50 MG: 10 INJECTION, SOLUTION EPIDURAL; INFILTRATION; INTRACAUDAL; PERINEURAL at 11:42:00

## 2024-02-12 RX ADMIN — SODIUM CHLORIDE, SODIUM LACTATE, POTASSIUM CHLORIDE, CALCIUM CHLORIDE: 600; 310; 30; 20 INJECTION, SOLUTION INTRAVENOUS at 11:35:00

## 2024-02-12 NOTE — PLAN OF CARE
A/o x4. RA//Is encouraged. Tele: SB. NPO at [facility] for surgery in AM. 1800 ada diet denies n/v. Voiding without difficulty. LBM 2/10. Denies pain. IV abx infusing. Up x 1 heel wbat. Wcx and Bcx pending. Plan to have surgery at 1100 with . POC updated with pt. All safety measures in place. Call light within reach instructed pt to call for help or assistance.

## 2024-02-12 NOTE — PROGRESS NOTES
INFECTIOUS DISEASE  PROGRESS NOTE            [facility]    Roberto Oviedo Patient Status:  Inpatient    1950 MRN [id number]   [facility] 3SW-A Attending Sourav Katz,    Hosp Day # 3 PCP Jhonathan Freitas MD     Antibiotics:#3  Unasyn#2    Subjective:  Returned from surgery    Objective:  Temp:  [97.7 °F (36.5 °C)-98.1 °F (36.7 °C)] 98.1 °F (36.7 °C)  Pulse:  [49-57] 51  Resp:  [16-18] 18  BP: (121-151)/(52-94) 141/94  SpO2:  [94 %-100 %] 94 %    General: awake alert  Vital signs:Temp:  [97.7 °F (36.5 °C)-98.1 °F (36.7 °C)] 98.1 °F (36.7 °C)  Pulse:  [49-57] 51  Resp:  [16-18] 18  BP: (121-151)/(52-94) 141/94  SpO2:  [94 %-100 %] 94 %  HEENT: Moist mucous membranes. Extraocular muscles are intact.  Neck: supple no masses  Respiratory: Non labored, symmetric excursion, normal respirations  Cardiovascular: no irregularities in rhythm  Abdomen: Soft, nontender, nondistended.   Musculoskeletal: foot dressed  Labs:     COVID-19 Lab Results    COVID-19  Lab Results   Component Value Date    COVID19 Not Detected 2022    COVID19 Not Detected 2022    COVID19 Not Detected 2021       Pro-Calcitonin  No results for input(s): \"PCT\" in the last 168 hours.    Cardiac  No results for input(s): \"TROP\", \"PBNP\" in the last 168 hours.    Creatinine Kinase  No results for input(s): \"CK\" in the last 168 hours.    Inflammatory Markers  No results for input(s): \"CRP\", \"TANISHA\", \"LDH\", \"DDIMER\" in the last 168 hours.    Recent Labs   Lab 02/10/24  0801 24  0511   RBC 3.38* 3.37*   HGB 10.3* 10.0*   HCT 31.4* 30.2*   MCV 92.9 89.6   MCH 30.5 29.7   MCHC 32.8 33.1   RDW 12.3 12.1   NEPRELIM 4.32  --    WBC 6.3 6.5   .0 240.0         Recent Labs   Lab 24  1332 02/10/24  0801 24  0511   * 193* 263*   BUN 21 24* 31*   CREATSERUM 1.14 0.96 1.00   CA 9.0 8.2* 8.3*   ALB 3.5 3.1*  --     142 138   K 4.4 4.6 4.3    112 109   CO2 27.0 27.0 26.0   ALKPHO 119* 101   --    AST 10* 6*  --    ALT 13* 12*  --    BILT 0.4 0.4  --    TP 7.3 6.5  --        No results found for: \"VANCT\"  Microbiology    Hospital Encounter on 02/09/24   1. Aerobic Bacterial Culture     Status: Abnormal    Collection Time: 02/10/24 10:44 AM    Specimen: Foot; Other   Result Value Ref Range    Aerobic Culture Result  N/A     Mixture of organisms suggestive of normal skin veronica    Aerobic Culture Result 1+ growth Staphylococcus aureus (A) N/A    Aerobic Smear 2+ WBCs seen N/A    Aerobic Smear 3+ Gram positive cocci in pairs N/A       Susceptibility    Staphylococcus aureus -  (no method available)     Cefazolin  Sensitive      Clindamycin <=0.25 Sensitive      Erythromycin <=0.25 Sensitive      Gentamicin <=0.5 Sensitive      Levofloxacin 0.25 Sensitive      Oxacillin <=0.25 Sensitive      Trimethoprim/Sulfa <=10 Sensitive      Vancomycin <=0.5 Sensitive    2. Blood Culture     Status: None (Preliminary result)    Collection Time: 02/09/24  2:36 PM    Specimen: Blood,peripheral   Result Value Ref Range    Blood Culture Result No Growth 2 Days N/A         Problem list reviewed:  Patient Active Problem List   Diagnosis    Type 2 diabetes mellitus without complication, without long-term current use of insulin (HCC)    PAD (peripheral artery disease) (HCC)    Peripheral neuropathic pain    Coronary artery disease involving native heart without angina pectoris    Hyperlipidemia associated with type 2 diabetes mellitus  (HCC)    Hypertension associated with diabetes  (HCC)    Moderate episode of recurrent major depressive disorder (HCC)    History of amputation of lesser toe, left (HCC)    Acute osteomyelitis of right foot (HCC)    Hyperglycemia             ASSESSMENT/PLAN:  1.  Ulceration right 5th MT, probes to bone and MRI evidence of osteomyelitis  Culture from site MSSA    SP partial ray amp  Continue cefazolin, flagyl    Possible po for discharge if expected adequate resection          Maximiliano Vizcarra,  MD, MD  [facility]  [phone]

## 2024-02-12 NOTE — PAYOR COMM NOTE
--------------  ADMISSION REVIEW     Payor: [employer]  Subscriber #:  [id number]  Authorization Number: [id number]    Admit date: 2/9/24  Admit time:  4:10 PM       REVIEW DOCUMENTATION:     ED Provider Notes        ED Provider Notes signed by Chester Lin DO at 2/9/2024  3:28 PM       Author: Chester Lin DO Service: -- Author Type: Physician    Filed: 2/9/2024  3:28 PM Date of Service: 2/9/2024  2:52 PM Status: Signed    : Chester Lin DO (Physician)           Patient Seen in: [facility] Emergency Department      History     Chief Complaint   Patient presents with    Wound Care     Stated Complaint: wound to right foot, hx of DM    Subjective:   73-year-old male, history of diabetes and hypertension presents from Dr. Richardson's office for admission for right foot osteomyelitis.  Has a diabetic ulcer to the right fifth metatarsal in the mid to distal area.  Seen in the office today, x-ray concerning for osteomyelitis, sent here for IV antibiotics blood cultures ID and vascular consultations.  He has no complaints, states has been there for \"a while.\"  Does not hurt him.  No fevers.  Has no complaints at this time.        Objective:   Past Medical History:   Diagnosis Date    Diabetes (HCC)     Essential hypertension     Heart attack (HCC)               Past Surgical History:   Procedure Laterality Date    EYE SURGERY  07/21/2022    OPEN HEART SURGICAL PROFILE                  Social History     Socioeconomic History    Marital status:    Tobacco Use    Smoking status: Never    Smokeless tobacco: Never   Vaping Use    Vaping Use: Never used   Substance and Sexual Activity    Alcohol use: Not Currently    Drug use: Never              Review of Systems   Constitutional:  Negative for fever.   Respiratory:  Negative for shortness of breath.    Cardiovascular:  Negative for chest pain.   Gastrointestinal:  Negative for abdominal pain.   Skin:  Positive for wound.    Neurological:  Negative for numbness.       Positive for stated complaint: wound to right foot, hx of DM  Other systems are as noted in HPI.  Constitutional and vital signs reviewed.      All other systems reviewed and negative except as noted above.    Physical Exam     ED Triage Vitals [02/09/24 1324]   /62   Pulse 85   Resp 18   Temp 98.3 °F (36.8 °C)   Temp src Temporal   SpO2 97 %   O2 Device None (Room air)       Current:/77   Pulse 74   Temp 98.3 °F (36.8 °C) (Temporal)   Resp 23   Ht 177.8 cm (5' 10\")   Wt 70.3 kg   SpO2 100%   BMI 22.24 kg/m²         Physical Exam  Vitals and nursing note reviewed.   Constitutional:       Appearance: He is not toxic-appearing.   Cardiovascular:      Rate and Rhythm: Normal rate.      Pulses: Normal pulses.   Pulmonary:      Effort: Pulmonary effort is normal. No respiratory distress.   Musculoskeletal:      Cervical back: Neck supple.   Skin:     General: Skin is warm and dry.   Neurological:      Mental Status: He is alert.   Psychiatric:         Mood and Affect: Mood normal.         Behavior: Behavior normal.         Has a circular ulcer on the plantar aspect of the right mid to distal fifth metatarsal region about 1 cm in diameter.  There is some purulence from it.  He also has some black discoloration, very small to the distal tip of the third toe.  Foot with some mild erythema.  No crepitus.  Distal pulses intact.    ED Course     Labs Reviewed   COMP METABOLIC PANEL (14) - Abnormal; Notable for the following components:       Result Value    Glucose 198 (*)     AST 10 (*)     ALT 13 (*)     Alkaline Phosphatase 119 (*)     A/G Ratio 0.9 (*)     All other components within normal limits   CBC W/ DIFFERENTIAL - Abnormal; Notable for the following components:    RBC 3.61 (*)     HGB 11.0 (*)     HCT 33.2 (*)     All other components within normal limits   CBC WITH DIFFERENTIAL WITH PLATELET    Narrative:     The following orders were created for  panel order CBC With Differential With Platelet.  Procedure                               Abnormality         Status                     ---------                               -----------         ------                     CBC W/ DIFFERENTIAL[[id number]]          Abnormal            Final result                 Please view results for these tests on the individual orders.   RAINBOW DRAW LAVENDER   RAINBOW DRAW LIGHT GREEN   RAINBOW DRAW BLUE   BLOOD CULTURE   BLOOD CULTURE                     MDM        Admission disposition: 2/9/2024  3:27 PM         No results found.    Differential diagnosis includes, but not limited to, cellulitis, osteomyelitis, hyperglycemia    External chart review demonstrates outpatient visit with podiatry earlier today    73-year-old male with osteomyelitis of the right foot.  Diabetic foot wound as noted above.  Hyperglycemic.  Given vancomycin and cefepime.  ID is aware, Dr. Vizcarra and will see in consultation.  Neurovascular intact.  Culture sent.  Awaiting bed assignment.  Admitted to Dr. Ibarra                                     Medical Decision Making      Disposition and Plan     Clinical Impression:  1. Acute osteomyelitis of right foot (HCC)    2. Hyperglycemia         Disposition:  Admit  2/9/2024  3:27 pm    Follow-up:  No follow-up provider specified.        Medications Prescribed:  Current Discharge Medication List                            Hospital Problems       Present on Admission  Date Reviewed: 1/29/2024            ICD-10-CM Noted POA    * (Principal) Acute osteomyelitis of right foot (HCC) M86.171 2/9/2024 Unknown                     Signed by Chester Lin DO on 2/9/2024  3:28 PM       History and Physical   Assessment & Plan:   #Right foot ulcer concerning for OM  -Admit  -IV abx  -MRI  -ID & Podiatry consult     #PVD  -Vascular consult     #CAD  #Essential hypertension  #Dyslipidemia  -Plavix  -Lisinopril  -Crestor   -Monitor hemodynamics  -Telemetry      #Diabetes mellitus  -Correctional scale  -Carb scale  -A1c      #DVT Px: Lovenox   2/10  ASSESSMENT/PLAN:  1. Ulceration right 5th MT, probes to bone and MRI evidence of osteomyelitis  -was debrided in podiatry office, no cultures in lab  Culture to be sent from bedside  Ho of MSSA, no history for MRSA  -site localized infection, no cellulitis     Can replace cefepime, vancomycin with unasyn for now and potentially broaden depending on future operative cultures in OR     Vascular evluation, arterial doppler  Impression and Plan:  Osteomyelitis right fifth toe-patient can proceed with podiatry intervention.  He has palpable pedal pulses.  Would recommend elevation to decrease some of the edema that he has prior to podiatry surgery.  Impression and Plan:      Patient Active Problem List   Diagnosis    Type 2 diabetes mellitus without complication, without long-term current use of insulin ([facility])    PAD (peripheral artery disease) ([facility])    Peripheral neuropathic pain    Coronary artery disease involving native heart without angina pectoris    Hyperlipidemia associated with type 2 diabetes mellitus  ([facility])    Hypertension associated with diabetes  ([facility])    Moderate episode of recurrent major depressive disorder ([facility])    History of amputation of lesser toe, left ([facility])    Acute osteomyelitis of right foot ([facility])    Hyperglycemia         -Patient examined, chart history reviewed.  -Patient is afebrile without leukocytosis.  -X-rays today show findings consistent with osteomyelitis to fifth metatarsal head.  -MRI does show osteomyelitis of the fifth metatarsal head and shaft as well as possibly to the second distal phalanx.  Clinically there is no wound to the second distal phalanx site is very stable.  -Vascular eval appreciated--no intervention planned.  -Discussed treatment options with patient and daughter.  Discussed that I would recommend partial fifth ray amputation of right foot given the above findings.  Surgery was  discussed in detail including benefits, risks, recovery period.     All treatment options have been discussed with the patient including both conservative and surgical attempts at correction. Potential risks and complications of surgical intervention were discussed at length which including but not not limited to death, loss of limb, post op pain, swelling, infection, bleeding, reoccurrence of the deformity, extended healing, and the possibility of further and future surgery.  No guarantees have been made to the patient and the informed consent has been signed.      -I would not recommend any intervention to second toe as the site is stable clinically and there is no wound.  Can continue to monitor.  -Wound cultures pending.  Infectious disease following patient.  -May benefit from cardiology clearance given open heart surgery in the past.  -Will continue to follow.  Partial fifth ray amputation tentatively scheduled for Monday at 11 AM.  Patient can be n.p.o. at midnight on Monday and anticoagulation to be held on Monday as well.  Assessment and Plan  R foot osteomyelitis w/ partial fifth ray amputation planned  Obtain ECG  Obtain echocardiogram to clarify LVEF and evaluate for RWMA  The patient reports the ability to ambulate 4 METs without anginal symptoms   RCIS risk 6% and LIU TABBY score of 1.3%  Elevated cardiac risk given comorbid conditions;however, acceptable for procedure as risks explained by performing physician, given benefit of infection source control.  Hx of CAD s/p 4 vessel CABG in 2008 unknown anatomy  On Plavix and statin therapy  Hx of NSTEMI approximately 5 years ago  Will request cath report from this time  No indication of ACS  PAD  Evaluated by vascular surgery  HTN  BP has been at goal except for a recent elevation. Could up titrate lisinopril if consistently above goal of 130 systolic  HLD  Continue high intensity statin  DM II     PLAN  ECG and echocardiogram  Intermediate risk given  cardiac comorbities and will await testing above; however, given the necessity for source control it is likely reasonable to proceed with surgery.  Obtain outpatient cath records from [place]  Continue Plavix unless surgical team requires it held  Continue statin therapy  Discussed with the patient and his daughter over the phone at his request in detail  2/11  ASSESSMENT/PLAN:  1.  Ulceration right 5th MT, probes to bone and MRI evidence of osteomyelitis  -was debrided in podiatry office, no cultures in lab  Culture to be sent from bedside  Ho of MSSA     Culture from wound Staph aureus S pending  Adjust abx per micro  Await surgery per podiatry  Assessment:  Right foot osteomyelitis  Wound cultures 2/10/24 positive for staph aureus   On IV abx per ID  Plan for fifth ray amputation on Monday 2.12/24  Hx of NSTEMI approximately 5 years ago  On Plavix, statin   CAD with hx of CABG x 4 in 2008  On statin, not on BB d/t SB    PVD  On Plavix, statin    HTN-controlled  On lisinopril   HLP-on statin   DM2     Plan:  Appears compensated cardiac status.  Continue current cardiac medications: lisinopril, plavix, statin.  Awaiting echo results.  Awaiting outpatient cath report from [place].   Continue abx per ID.  Tentative plan to proceed with right fifth ray amputation tomorrow with podiatry.    Patient seen at bedside this afternoon. H+P from yesterday unchanged. Surgery planned for 11am tomorrow. Please have patient NPO at midnight and hold AC for tomorrow (okay to continue plavix).   2/12  Assessment:  Right foot osteomyelitis  Wound cultures 2/10/24 positive for staph aureus   On IV abx per ID  Plan for fifth ray amputation on Monday 2/12/24  Hx of NSTEMI approximately 5 years ago  On Plavix, statin   Echo with normal LVEF 55-60%, no WMA  CAD with hx of CABG x 4 in 2008  On statin, not on BB d/t SB    PVD  On Plavix, statin    HTN-controlled  On lisinopril   HLP-on statin   DM2     Plan:  Patient is at elevated but  acceptable risk for planned surgery given comorbid conditions and lack of ability to determine METS of activity per Dr Katz   Continue statin, plavix  BB held due to bradycardia  POD#0 s/p partial 5th ray amputation of right foot  OK to weight bear for small trips/to the bathroom  Should otherwise stay off foot as much as possible and elevate at all times  Please hold AC until dressings changed tomorrow  OK to resume regular diet  ASSESSMENT/PLAN:  1.  Ulceration right 5th MT, probes to bone and MRI evidence of osteomyelitis  Culture from site MSSA     SP partial ray amp  Continue cefazolin, flagyl     Possible po for discharge if expected adequate resection  MEDICATIONS ADMINISTERED IN LAST 1 DAY:  ampicillin-sulbactam (Unasyn) 3 g in sodium chloride 0.9% 100mL IVPB-ADD       Date Action Dose Route User    2/12/2024 0400 New Bag 3 g Intravenous Ray Fofana RN    2/11/2024 2200 New Bag 3 g Intravenous Ray Fofana RN    2/11/2024 1656 New Bag 3 g Intravenous Chrystal Salcido RN          bupivacaine PF (Marcaine) 0.5% injection       Date Action Dose Route User    2/12/2024 1153 Given 10 mL Infiltration Block, Jayesh Cleveland DPM          ceFAZolin (Ancef) 2 g in 20mL IV syringe premix       Date Action Dose Route User    2/12/2024 1147 Given 2 g Intravenous Ijeoma Healy MD    2/12/2024 0849 Given 2 g Intravenous Nayana Beaver RN          clopidogrel (Plavix) tab 75 mg       Date Action Dose Route User    2/12/2024 0846 Given 75 mg Oral Nayana Beaver RN          dexamethasone (Decadron) 4 MG/ML injection       Date Action Dose Route User    2/12/2024 1148 Given 4 mg Intravenous Ijeoma Healy MD          enoxaparin (Lovenox) 40 MG/0.4ML SUBQ injection 40 mg       Date Action Dose Route User    2/11/2024 1656 Given 40 mg Subcutaneous (Left Lower Abdomen) Chrystal Salcido RN          gabapentin (Neurontin) cap 300 mg       Date Action Dose Route User    2/11/2024 2054 Given 300 mg Oral Ray Fofana  RN          HYDROcodone-acetaminophen (Norco) 5-325 MG per tab 1 tablet       Date Action Dose Route User    2/12/2024 1527 Given 1 tablet Oral Nayana Beaver RN          insulin aspart (NovoLOG) 100 Units/mL FlexPen 1-10 Units       Date Action Dose Route User    2/12/2024 0602 Given 4 Units Subcutaneous (Left Lower Abdomen) Ray Fofana RN    2/11/2024 1656 Given 5 Units Subcutaneous (Left Lower Arm) Chrystal Salcido RN          insulin aspart (NovoLOG) 100 Units/mL FlexPen 1-68 Units       Date Action Dose Route User    2/11/2024 1657 Given 4 Units Subcutaneous (Left Lower Arm) Chrystal Salcido RN          lactated ringers infusion       Date Action Dose Route User    2/12/2024 1135 New Bag (none) Intravenous Ijeoma Healy MD          lidocaine PF (Xylocaine-MPF) 1% injection       Date Action Dose Route User    2/12/2024 1142 Given 50 mg Intravenous Ijeoma Healy MD          lidocaine (Xylocaine) 1 % injection       Date Action Dose Route User    2/12/2024 1154 Given 10 mL Intradermal Block, Jayesh [place] DPM          lisinopril (Prinivil; Zestril) tab 20 mg       Date Action Dose Route User    2/12/2024 0846 Given 20 mg Oral Nayana Beaver RN          morphINE PF 4 MG/ML injection 2 mg       Date Action Dose Route User    2/12/2024 1619 Given 2 mg Intravenous Nayana Beaver RN          ondansetron (Zofran) 4 MG/2ML injection       Date Action Dose Route User    2/12/2024 1148 Given 4 mg Intravenous Ijeoma Healy MD          propofol (Diprivan) 10 MG/ML injection       Date Action Dose Route User    2/12/2024 1142 Given 50 mg Intravenous Ijeoma Healy MD          propofol (Diprivan) 10 mg/mL infusion premix       Date Action Dose Route User    2/12/2024 1158 Rate/Dose Change 50 mcg/kg/min × 70.3 kg Intravenous Ijeoma Healy MD    2/12/2024 1142 New Bag 75 mcg/kg/min × 70.3 kg Intravenous Ijeoma Healy MD          rosuvastatin (Crestor) tab 40 mg       Date Action Dose Route User     2/12/2024 0846 Given 40 mg Oral Nayana Beaver RN          sertraline (Zoloft) tab 100 mg       Date Action Dose Route User    2/12/2024 0846 Given 100 mg Oral Nayana Beaver RN            Vitals (last day)       Date/Time Temp Pulse Resp BP SpO2 Weight O2 Device O2 Flow Rate (L/min) Who    02/12/24 1313 97.4 °F (36.3 °C) 46 12 163/53 100 % -- None (Room air) -- RB    02/12/24 1300 98 °F (36.7 °C) 46 10 152/66 97 % -- None (Room air) -- DM    02/12/24 1245 -- 46 16 162/59 97 % -- -- -- DM    02/12/24 1240 -- 49 21 -- 99 % -- -- -- DM    02/12/24 0820 97.9 °F (36.6 °C) 48 18 168/54 98 % -- -- -- DW    02/12/24 0400 98.1 °F (36.7 °C) 51 18 141/94 94 % -- None (Room air) -- RR    02/11/24 2000 98.1 °F (36.7 °C) 49 18 151/52 96 % -- None (Room air) -- RR          CIWA Scores (since admission)       None            PLEASE FAX DAYS CERTIFIED AND NEXT REVIEW DATE [phone]

## 2024-02-12 NOTE — PLAN OF CARE
Alert and oriented x 4. BP elevated, received Lisinopril. Right plantar 5th toe covered with gauze and tegaderm dressing Voiding without difficulty via external catheter. Had BM today. NPO for surgery today. Plan of care discussed with patient.

## 2024-02-12 NOTE — PROGRESS NOTES
Progress Note  Roberto Oviedo Patient Status:  Inpatient    1950 MRN [id number]   Location [facility] 3SW-A Attending Sourav Katz, DO   Hosp Day # 3 PCP Jhonathan Freitas MD     Subjective:  No complaint of chest pain or SOB    Objective:  BP (!) 168/54 (BP Location: Left arm)   Pulse (!) 48   Temp 97.9 °F (36.6 °C) (Oral)   Resp 18   Ht 5' 10\" (1.778 m)   Wt 155 lb (70.3 kg)   SpO2 98%   BMI 22.24 kg/m²     Telemetry: SB. HR 48-50's      Intake/Output: -3210        Last 3 Weights   24 1625 155 lb (70.3 kg)   24 1324 155 lb (70.3 kg)   23 0837 152 lb (68.9 kg)   22 1028 144 lb (65.3 kg)       Labs:  Recent Labs   Lab 24  1332 02/10/24  0801 24  0511   * 193* 263*   BUN 21 24* 31*   CREATSERUM 1.14 0.96 1.00   EGFRCR 68 84 80   CA 9.0 8.2* 8.3*   ALB 3.5 3.1*  --     142 138   K 4.4 4.6 4.3    112 109   CO2 27.0 27.0 26.0   ALKPHO 119* 101  --    AST 10* 6*  --    ALT 13* 12*  --    BILT 0.4 0.4  --    TP 7.3 6.5  --      Recent Labs   Lab 24  1332 02/10/24  0801 24  0511   RBC 3.61* 3.38* 3.37*   HGB 11.0* 10.3* 10.0*   HCT 33.2* 31.4* 30.2*   MCV 92.0 92.9 89.6   MCH 30.5 30.5 29.7   MCHC 33.1 32.8 33.1   RDW 12.2 12.3 12.1   NEPRELIM 7.35 4.32  --    WBC 9.8 6.3 6.5   .0 230.0 240.0         No results for input(s): \"TROP\", \"TROPHS\", \"CK\" in the last 168 hours.  Lab Results   Component Value Date    INR 1.03 10/04/2021       Diagnostics:     Review of Systems   Constitutional: Negative.   Cardiovascular: Negative.    Respiratory: Negative.         Physical Exam:    Gen: Alert, oriented x 3, in no apparent distress  Heent: Pupils equal, reactive. Mucous membranes moist.   Cardiac: Regular rate and rhythm, normal S1,S2  Lungs: Clear to auscultation  Abd: Soft, non tender, non distended  Ext: right foot erythema  Skin: Warm, dry          Medications:     ceFAZolin  2 g Intravenous Q8H    metRONIDAZOLE  500 mg Oral 2 times per  day    clopidogrel  75 mg Oral Daily    lisinopril  20 mg Oral Daily    sertraline  100 mg Oral Daily    rosuvastatin  40 mg Oral Daily    gabapentin  300 mg Oral Nightly    [Held by provider] enoxaparin  40 mg Subcutaneous Daily    insulin aspart  1-10 Units Subcutaneous TID AC and HS    insulin aspart  1-68 Units Subcutaneous TID CC             Assessment:  Right foot osteomyelitis  Wound cultures 2/10/24 positive for staph aureus   On IV abx per ID  Plan for fifth ray amputation on Monday 2/12/24  Hx of NSTEMI approximately 5 years ago  On Plavix, statin   Echo with normal LVEF 55-60%, no WMA  CAD with hx of CABG x 4 in 2008  On statin, not on BB d/t SB    PVD  On Plavix, statin    HTN-controlled  On lisinopril   HLP-on statin   DM2    Plan:  Patient is at elevated but acceptable risk for planned surgery given comorbid conditions and lack of ability to determine METS of activity per Dr Katz   Continue statin, plavix  BB held due to bradycardia  Plan of care discussed with patient, RN.    BRYCE Laws  2/12/2024  9:55 AM    Patient seen and examined. Agree with the findings and plan of care.

## 2024-02-12 NOTE — PROGRESS NOTES
Patient returned via bed from PACU. Oriented to room. Safety precautions initiated. Call light in reach. Denies pain. Ace wrap clean and dry to right foot with post op shoe in place.

## 2024-02-12 NOTE — PROGRESS NOTES
[facility]     Hospitalist Progress Note     Roberto Oviedo Patient Status:  Inpatient    1950 MRN [id number]   Location [facility] 0SW-A Attending Sourav Katz,    Hosp Day # 3 PCP Jhonathan Freitas MD     Chief Complaint: Right foot wound     Subjective:     Patient resting in bed with no complaints    Objective:    Review of Systems:   A comprehensive review of systems was completed; pertinent positive and negatives stated in subjective.    Vital signs:  Temp:  [97.7 °F (36.5 °C)-98.1 °F (36.7 °C)] 97.9 °F (36.6 °C)  Pulse:  [48-57] 48  Resp:  [16-18] 18  BP: (121-168)/(52-94) 168/54  SpO2:  [94 %-100 %] 98 %    Physical Exam:    General: No acute distress  Respiratory: No wheezes, no rhonchi  Cardiovascular: S1, S2, regular rate and rhythm  Abdomen: Soft, Non-tender, non-distended, positive bowel sounds  Neuro: No new focal deficits.   Extremities: RLE lateral foot wound     Diagnostic Data:    Labs:  Recent Labs   Lab 24  1332 02/10/24  0801 24  0511   WBC 9.8 6.3 6.5   HGB 11.0* 10.3* 10.0*   MCV 92.0 92.9 89.6   .0 230.0 240.0       Recent Labs   Lab 24  1332 02/10/24  0801 24  0511   * 193* 263*   BUN 21 24* 31*   CREATSERUM 1.14 0.96 1.00   CA 9.0 8.2* 8.3*   ALB 3.5 3.1*  --     142 138   K 4.4 4.6 4.3    112 109   CO2 27.0 27.0 26.0   ALKPHO 119* 101  --    AST 10* 6*  --    ALT 13* 12*  --    BILT 0.4 0.4  --    TP 7.3 6.5  --        Estimated Creatinine Clearance: 66.4 mL/min (based on SCr of 1 mg/dL).    No results for input(s): \"TROP\", \"TROPHS\", \"CK\" in the last 168 hours.    No results for input(s): \"PTP\", \"INR\" in the last 168 hours.               Microbiology    Hospital Encounter on 24   1. Aerobic Bacterial Culture     Status: Abnormal    Collection Time: 02/10/24 10:44 AM    Specimen: Foot; Other   Result Value Ref Range    Aerobic Culture Result  N/A     Mixture of organisms suggestive of normal skin veronica    Aerobic  Culture Result 1+ growth Staphylococcus aureus (A) N/A    Aerobic Smear 2+ WBCs seen N/A    Aerobic Smear 3+ Gram positive cocci in pairs N/A       Susceptibility    Staphylococcus aureus -  (no method available)     Cefazolin  Sensitive      Clindamycin <=0.25 Sensitive      Erythromycin <=0.25 Sensitive      Gentamicin <=0.5 Sensitive      Levofloxacin 0.25 Sensitive      Oxacillin <=0.25 Sensitive      Trimethoprim/Sulfa <=10 Sensitive      Vancomycin <=0.5 Sensitive    2. Blood Culture     Status: None (Preliminary result)    Collection Time: 02/09/24  2:36 PM    Specimen: Blood,peripheral   Result Value Ref Range    Blood Culture Result No Growth 2 Days N/A         Imaging: Reviewed in Epic.    Medications:    ceFAZolin  2 g Intravenous Q8H    metRONIDAZOLE  500 mg Oral 2 times per day    clopidogrel  75 mg Oral Daily    lisinopril  20 mg Oral Daily    sertraline  100 mg Oral Daily    rosuvastatin  40 mg Oral Daily    gabapentin  300 mg Oral Nightly    [Held by provider] enoxaparin  40 mg Subcutaneous Daily    insulin aspart  1-10 Units Subcutaneous TID AC and HS    insulin aspart  1-68 Units Subcutaneous TID CC       Assessment & Plan:      #Right fifth metatarsal osteomyelitis  #Right second phalanx osteomyelitis   #PVD  -MRI foot reviewed   -IV antibiotics   -Plan for partial fifth ray amputation today   -Vascular surgery following  -Podiatry following  -ID following    #CAD  #Hypertension  #Hyperlipidemia  -Plavix, Lisinopril, Crestor  -Cardiology following     #Diabetes mellitus  -Hyperglycemia protocol       Sourav Katz DO    Supplementary Documentation:     Quality:  DVT Mechanical Prophylaxis:   SCDs,    DVT Pharmacologic Prophylaxis   Medication    [Held by provider] enoxaparin (Lovenox) 40 MG/0.4ML SUBQ injection 40 mg                Code Status: Not on file  Gonsales: External urinary catheter in place  Gonsales Duration (in days):   Central line:    TIFFANY:     Discharge is dependent on: Clinical  improvement   At this point Mr. Oviedo is expected to be discharge to: Home     The 21st Century Cures Act makes medical notes like these available to patients in the interest of transparency. Please be advised this is a medical document. Medical documents are intended to carry relevant information, facts as evident, and the clinical opinion of the practitioner. The medical note is intended as peer to peer communication and may appear blunt or direct. It is written in medical language and may contain abbreviations or verbiage that are unfamiliar.             **Certification      PHYSICIAN Certification of Need for Inpatient Hospitalization - Initial Certification    Patient will require inpatient services that will reasonably be expected to span two midnight's based on the clinical documentation in H+P.   Based on patients current state of illness, I anticipate that, after discharge, patient will require TBD.

## 2024-02-12 NOTE — ANESTHESIA POSTPROCEDURE EVALUATION
[facility]    Roberto Oviedo Patient Status:  Inpatient   Age/Gender 73 year old male MRN [id number]   Location [facility] SURGERY Attending Sourav Katz,    Hosp Day # 3 PCP Jhonathan Freitas MD       Anesthesia Post-op Note    PARTIAL 5TH. RAY  AMPUTATION - RIGHT    Procedure Summary       Date: 02/12/24 Room / Location:  MAIN OR 18 /  MAIN OR    Anesthesia Start: 1135 Anesthesia Stop: 1223    Procedure: PARTIAL 5TH. RAY  AMPUTATION - RIGHT (Right: Foot) Diagnosis:       Osteomyelitis (HCC)      (Osteomyelitis (HCC) [M86.9])    Surgeons: Jayesh Richardson DPM Anesthesiologist: Ijeoma Healy MD    Anesthesia Type: MAC ASA Status: 3            Anesthesia Type: MAC    Vitals Value Taken Time   /61 02/12/24 1223   Temp 98.1 02/12/24 1223   Pulse 51 02/12/24 1223   Resp 16 02/12/24 1223   SpO2 98 02/12/24 1223       Patient Location: PACU    Anesthesia Type: MAC    Airway Patency: patent    Postop Pain Control: adequate    Mental Status: preanesthetic baseline    Nausea/Vomiting: none    Cardiopulmonary/Hydration status: stable euvolemic    Complications: no apparent anesthesia related complications    Postop vital signs: stable    Comments: Signout given to PACU ALLA Weinberg    Dental Exam: Unchanged from Preop    Patient to be discharged from PACU when criteria met.

## 2024-02-12 NOTE — BRIEF OP NOTE
Pre-Operative Diagnosis: Osteomyelitis, right 5th metatarsal     Post-Operative Diagnosis: Osteomyelitis, right 5th metatarsal      Procedure Performed:   PARTIAL 5TH. RAY  AMPUTATION - RIGHT    Surgeon(s) and Role:     * Jayesh Richardson DPM - Primary    Assistant(s):   none     Surgical Findings: consistent with pre op diagnosis     Specimen:   Right 5th met bone--micro  Partial 5th ray--path  5th metatarsal clearance fragment--path     Estimated Blood Loss: Blood Output: 10 mL (2/12/2024 12:06 PM)      Dictation Number:   See Epic    Jayesh Richardson DPM  2/12/2024  12:34 PM

## 2024-02-12 NOTE — PROGRESS NOTES
POD#0 s/p partial 5th ray amputation of right foot  OK to weight bear for small trips/to the bathroom  Should otherwise stay off foot as much as possible and elevate at all times  Please hold AC until dressings changed tomorrow  OK to resume regular diet

## 2024-02-13 ENCOUNTER — TELEPHONE (OUTPATIENT)
Dept: PODIATRY CLINIC | Facility: CLINIC | Age: 74
End: 2024-02-13

## 2024-02-13 VITALS
WEIGHT: 155 LBS | SYSTOLIC BLOOD PRESSURE: 121 MMHG | RESPIRATION RATE: 20 BRPM | BODY MASS INDEX: 22.19 KG/M2 | HEIGHT: 70 IN | DIASTOLIC BLOOD PRESSURE: 38 MMHG | OXYGEN SATURATION: 98 % | HEART RATE: 50 BPM | TEMPERATURE: 98 F

## 2024-02-13 PROBLEM — Z95.1 HISTORY OF CORONARY ARTERY BYPASS GRAFT: Chronic | Status: ACTIVE | Noted: 2024-02-13

## 2024-02-13 PROBLEM — Z89.421 STATUS POST AMPUTATION OF LESSER TOE OF RIGHT FOOT (HCC): Status: ACTIVE | Noted: 2024-02-13

## 2024-02-13 LAB
GLUCOSE BLD-MCNC: 272 MG/DL (ref 70–99)
GLUCOSE BLD-MCNC: 339 MG/DL (ref 70–99)

## 2024-02-13 PROCEDURE — 99239 HOSP IP/OBS DSCHRG MGMT >30: CPT | Performed by: STUDENT IN AN ORGANIZED HEALTH CARE EDUCATION/TRAINING PROGRAM

## 2024-02-13 RX ORDER — ASPIRIN 81 MG/1
81 TABLET, CHEWABLE ORAL DAILY
Status: DISCONTINUED | OUTPATIENT
Start: 2024-02-13 | End: 2024-02-13

## 2024-02-13 RX ORDER — CEPHALEXIN 500 MG/1
500 CAPSULE ORAL 3 TIMES DAILY
Qty: 45 CAPSULE | Refills: 0 | Status: SHIPPED | OUTPATIENT
Start: 2024-02-13 | End: 2024-02-28

## 2024-02-13 RX ORDER — EZETIMIBE 10 MG/1
10 TABLET ORAL NIGHTLY
Qty: 30 TABLET | Refills: 0 | Status: SHIPPED | OUTPATIENT
Start: 2024-02-13 | End: 2024-03-14

## 2024-02-13 RX ORDER — ASPIRIN 81 MG/1
81 TABLET, CHEWABLE ORAL DAILY
Qty: 30 TABLET | Refills: 0 | Status: SHIPPED | OUTPATIENT
Start: 2024-02-14 | End: 2024-03-15

## 2024-02-13 RX ORDER — EZETIMIBE 10 MG/1
10 TABLET ORAL NIGHTLY
Status: DISCONTINUED | OUTPATIENT
Start: 2024-02-13 | End: 2024-02-13

## 2024-02-13 NOTE — PROGRESS NOTES
POD#1 s/p partial 5th ray amputation  Site inspected--noted to be healing well and without concerns at this time  Site re-dressed with betadine and dry dressings--should leave intact until follow up visit next week  Continue to elevate/stay off foot as much as possible. OK to WBAT in post op shoe when needed.  Would cultures pending. OK for DC on oral abx from podiatry standpoint when OK with other services.  Will arrange for clinic follow up visit in 1-2 weeks upon DC.

## 2024-02-13 NOTE — PROGRESS NOTES
NURSING DISCHARGE NOTE    Discharged Home via Wheelchair.  Accompanied by Support staff  Belongings Taken by patient/family.    Patient cleared for discharge by hospitalist, podiatry, infectious disease, vascular, and cardiology. IV removed. Patient and patients daughter educated on all AVS discharge instructions. All questions answered. Instructed to  medications from pharmacy and go to follow up appointments. Patient brought down to [facility] with all belongings and paperwork to be driven home by daughter.

## 2024-02-13 NOTE — TELEPHONE ENCOUNTER
Can we schedule post op visit in 1-2 weeks please   Per Jose's request I sent him his eyeglass rx to his home address.

## 2024-02-13 NOTE — PROGRESS NOTES
[facility]     Hospitalist Progress Note     Roberto Oviedo Patient Status:  Inpatient    1950 MRN [id number]   Location [facility] 0SW-A Attending Sourav Katz, DO   Hosp Day # 4 PCP Jhonathan Freitas MD     Chief Complaint: Right foot wound     Subjective:     Patient resting in bed with no complaints. He reports no pain     Objective:    Review of Systems:   A comprehensive review of systems was completed; pertinent positive and negatives stated in subjective.    Vital signs:  Temp:  [97.4 °F (36.3 °C)-98.5 °F (36.9 °C)] 98.3 °F (36.8 °C)  Pulse:  [46-65] 50  Resp:  [10-21] 20  BP: (114-163)/(42-87) 114/42  SpO2:  [92 %-100 %] 92 %    Physical Exam:    General: No acute distress  Respiratory: No wheezes, no rhonchi  Cardiovascular: S1, S2, regular rate and rhythm  Abdomen: Soft, Non-tender, non-distended, positive bowel sounds  Neuro: No new focal deficits.   Extremities: RLE lateral foot wound in dressing     Diagnostic Data:    Labs:  Recent Labs   Lab 24  1332 02/10/24  0801 24  0511   WBC 9.8 6.3 6.5   HGB 11.0* 10.3* 10.0*   MCV 92.0 92.9 89.6   .0 230.0 240.0       Recent Labs   Lab 24  1332 02/10/24  0801 24  0511   * 193* 263*   BUN 21 24* 31*   CREATSERUM 1.14 0.96 1.00   CA 9.0 8.2* 8.3*   ALB 3.5 3.1*  --     142 138   K 4.4 4.6 4.3    112 109   CO2 27.0 27.0 26.0   ALKPHO 119* 101  --    AST 10* 6*  --    ALT 13* 12*  --    BILT 0.4 0.4  --    TP 7.3 6.5  --        Estimated Creatinine Clearance: 66.4 mL/min (based on SCr of 1 mg/dL).    No results for input(s): \"TROP\", \"TROPHS\", \"CK\" in the last 168 hours.    No results for input(s): \"PTP\", \"INR\" in the last 168 hours.               Microbiology    Hospital Encounter on 24   1. Tissue Aerobic Culture     Status: None (Preliminary result)    Collection Time: 24 11:57 AM    Specimen: Toe; Tissue   Result Value Ref Range    Tissue Smear 2+ WBCs seen N/A    Tissue Smear No  organisms seen N/A   2. Aerobic Bacterial Culture     Status: Abnormal    Collection Time: 02/10/24 10:44 AM    Specimen: Foot; Other   Result Value Ref Range    Aerobic Culture Result  N/A     Mixture of organisms suggestive of normal skin veronica    Aerobic Culture Result 1+ growth Staphylococcus aureus (A) N/A    Aerobic Smear 2+ WBCs seen N/A    Aerobic Smear 3+ Gram positive cocci in pairs N/A       Susceptibility    Staphylococcus aureus -  (no method available)     Cefazolin  Sensitive      Clindamycin <=0.25 Sensitive      Erythromycin <=0.25 Sensitive      Gentamicin <=0.5 Sensitive      Levofloxacin 0.25 Sensitive      Oxacillin <=0.25 Sensitive      Trimethoprim/Sulfa <=10 Sensitive      Vancomycin <=0.5 Sensitive    3. Blood Culture     Status: None (Preliminary result)    Collection Time: 02/09/24  2:36 PM    Specimen: Blood,peripheral   Result Value Ref Range    Blood Culture Result No Growth 3 Days N/A         Imaging: Reviewed in Epic.    Medications:    ezetimibe  10 mg Oral Nightly    aspirin  81 mg Oral Daily    ceFAZolin  2 g Intravenous Q8H    metRONIDAZOLE  500 mg Oral 2 times per day    clopidogrel  75 mg Oral Daily    lisinopril  20 mg Oral Daily    sertraline  100 mg Oral Daily    rosuvastatin  40 mg Oral Daily    gabapentin  300 mg Oral Nightly    [Held by provider] enoxaparin  40 mg Subcutaneous Daily    insulin aspart  1-10 Units Subcutaneous TID AC and HS    insulin aspart  1-68 Units Subcutaneous TID CC       Assessment & Plan:      #Right fifth metatarsal osteomyelitis  #Right second phalanx osteomyelitis   #PVD  -S/p partial fifth ray amputation 2/12  -IV antibiotics   -Vascular surgery following  -Podiatry following  -ID following    #CAD  #Hypertension  #Hyperlipidemia  -Plavix, Lisinopril, Crestor  -Cardiology signed off      #Diabetes mellitus  -Hyperglycemia protocol       Sourav Katz DO    Supplementary Documentation:     Quality:  DVT Mechanical Prophylaxis:   SCDs,    DVT  Pharmacologic Prophylaxis   Medication    [Held by provider] enoxaparin (Lovenox) 40 MG/0.4ML SUBQ injection 40 mg      DVT Pharmacologic prophylaxis: Aspirin 162 mg         Code Status: Not on file  Gonsales: External urinary catheter in place  Gonsales Duration (in days):   Central line:    TIFFANY: 2/14/2024    Discharge is dependent on: Clinical improvement   At this point Mr. Oviedo is expected to be discharge to: Home     The 21st Century Cures Act makes medical notes like these available to patients in the interest of transparency. Please be advised this is a medical document. Medical documents are intended to carry relevant information, facts as evident, and the clinical opinion of the practitioner. The medical note is intended as peer to peer communication and may appear blunt or direct. It is written in medical language and may contain abbreviations or verbiage that are unfamiliar.             **Certification      PHYSICIAN Certification of Need for Inpatient Hospitalization - Initial Certification    Patient will require inpatient services that will reasonably be expected to span two midnight's based on the clinical documentation in H+P.   Based on patients current state of illness, I anticipate that, after discharge, patient will require TBD.

## 2024-02-13 NOTE — PLAN OF CARE
Patient alert and oriented x4. VSS on RA. Pain controlled with PO PRN pain meds. Kerlix and ace wrap to right foot, CDI. Left SCD in place, ankle pumps encouraged. IS encouraged. Patient up x1 with the post op shoe, heel WB to small trips to bathroom. RLE elevated. Tolerating diet, no c/o n/v. Voiding freely in there bathroom.     Plan: IV and PO abx, wound and blood cultures pending

## 2024-02-13 NOTE — DISCHARGE INSTRUCTIONS
Per Dr. Richardson: should leave dressing intact until follow up visit next week. Continue to elevate/stay off foot as much as possible. OK to WBAT in post op show when needed.

## 2024-02-13 NOTE — DISCHARGE SUMMARY
[facility]     Roberto Oviedo Patient Status:  Inpatient    1950 MRN [id number]   Location [facility] 3SW-A Attending Sourav Katz, DO   Hosp Day # 4 PCP Jhonathan Freitas MD     Date of Admission: 2024  Date of Discharge:   2023    Discharge Disposition: Home Health Care Services    Discharge Diagnosis:  #Right fifth metatarsal osteomyelitis  #Right second phalanx osteomyelitis  #PVD  #CAD  #Hypertension  #Hyperlipidemia  #Diabetes    History of Present Illness: Roberto Oviedo is a 73 year old male with a history of CAD, essential hypertension, dyslipidemia, diabetes mellitus and PVD who presents with right foot wound. Patient was seen by podiatrist and sent to the ER d/t right foot wound, probe to bone, concerning for OM. Patient with minimal right foot pain. Some drainage at home. No chest pain or shortness of breath. No nausea, vomiting, diarrhea.         Brief Synopsis: Vascular surgery, podiatry, ID were consulted.  Patient was started on IV antibiotics.  Patient underwent partial fifth ray amputation.  Cardiology was consulted for cardiac clearance.  Patient was discharged home with outpatient follow-up.    Lace+ Score: 63  59-90 High Risk  29-58 Medium Risk  0-28   Low Risk  Patient was referred to the [facility].    TCM Follow-Up Recommendation:  LACE > 58: High Risk of readmission after discharge from the hospital.      Procedures during hospitalization:   Partial fifth ray amputation    Incidental or significant findings and recommendations (brief descriptions):  See brief synopsis above    Lab/Test results pending at Discharge:   None    Consultants:  Vascular surgery  ID  Podiatry    Discharge Medication List:     Discharge Medications        START taking these medications        Instructions Prescription details   aspirin 81 MG Chew  Start taking on: 2024      Chew 1 tablet (81 mg total) by mouth daily.   Stop taking on:  March 15, 2024  Quantity: 30 tablet  Refills: 0     cephalexin 500 MG Caps  Commonly known as: Keflex      Take 1 capsule (500 mg total) by mouth 3 (three) times daily for 15 days.   Stop taking on: February 28, 2024  Quantity: 45 capsule  Refills: 0     ezetimibe 10 MG Tabs  Commonly known as: Zetia      Take 1 tablet (10 mg total) by mouth nightly.   Stop taking on: March 14, 2024  Quantity: 30 tablet  Refills: 0            CONTINUE taking these medications        Instructions Prescription details   Bydureon 2 MG Pen  Generic drug: Exenatide ER      Inject 2 mg into the skin once a week.   Refills: 0     clopidogrel 75 MG Tabs  Commonly known as: Plavix      Take 1 tablet (75 mg total) by mouth daily.   Quantity: 90 tablet  Refills: 0     FreeStyle Dana 2 Sensor Misc      1 each every 14 (fourteen) days. One sensor every 14 days.   Quantity: 2 each  Refills: 3     gabapentin 300 MG Caps  Commonly known as: Neurontin      Take 1 capsule (300 mg total) by mouth nightly.   Quantity: 90 capsule  Refills: 0     glimepiride 1 MG Tabs  Commonly known as: Amaryl      Take 1 tablet (1 mg total) by mouth daily with breakfast. *Due for labs and Annual physical, please call the office*   Quantity: 30 tablet  Refills: 0     lisinopril 20 MG Tabs  Commonly known as: Prinivil; Zestril      TAKE 1 TABLET BY MOUTH EVERY DAY   Quantity: 90 tablet  Refills: 1     Melatonin 5 MG Tabs      Take 1 tablet (5 mg total) by mouth at bedtime.   Refills: 0     metFORMIN 500 MG Tabs  Commonly known as: Glucophage      TAKE 2 TABLETS BY MOUTH TWICE A DAY WITH MEALS   Quantity: 360 tablet  Refills: 0     rosuvastatin 40 MG Tabs  Commonly known as: Crestor      Take 1 tablet (40 mg total) by mouth daily.   Quantity: 90 tablet  Refills: 3     sertraline 100 MG Tabs  Commonly known as: Zoloft      Take 1 tablet (100 mg total) by mouth daily.   Quantity: 90 tablet  Refills: 0               Where to Get Your Medications        These medications  were sent to [facility]/pharmacy #[id number] - [place], [place] - [address] [phone], [phone]  [address] [facility] [address]      Phone: [phone]   aspirin 81 MG Chew  cephalexin 500 MG Caps  ezetimibe 10 MG Tabs         [place]    Follow-up appointment:   Jhonathan Freitas MD   [address] [place] [address] [address]  [facility] [address] [phone]    Schedule an appointment as soon as possible for a visit in 1 week(s)      Michael Dodson MD  [address] [facility]  [address] [facility] [address]  [phone]    Schedule an appointment as soon as possible for a visit in 1 week(s)      Jayesh Richardson DPM  [facility] [address]  [place] [address]  [phone]    Schedule an appointment as soon as possible for a visit in 1 week(s)  Follow up in 1-2 weeks    Appointments for Next 30 Days 2024 - 3/14/2024        Date Arrival Time Visit Type Length Department Provider     2024  9:00 AM  FOLLOW UP VISIT [2828] 30 min [facility], [address] [place] [address] Mirlande Capone APRN    Patient Instructions:         Location Instructions:     Masks are optional for all patients and visitors, unless otherwise indicated.                      Vital signs:  Temp:  [97.6 °F (36.4 °C)-98.5 °F (36.9 °C)] 97.6 °F (36.4 °C)  Pulse:  [50-65] 50  Resp:  [16-20] 20  BP: (114-163)/(38-87) 121/38  SpO2:  [92 %-100 %] 98 %    Physical Exam:    General: No acute distress   Lungs: clear to auscultation  Cardiovascular: S1, S2  Abdomen: Soft      -----------------------------------------------------------------------------------------------  PATIENT DISCHARGE INSTRUCTIONS: See electronic chart    Sourav Katz,     Total time spent on discharge plannin minutes     The  Century Cures Act makes medical notes like these available to patients in the interest of transparency. Please be advised this is a medical document. Medical documents are intended to carry relevant information,  facts as evident, and the clinical opinion of the practitioner. The medical note is intended as peer to peer communication and may appear blunt or direct. It is written in medical language and may contain abbreviations or verbiage that are unfamiliar.

## 2024-02-13 NOTE — PROGRESS NOTES
[facility]  Progress Note    Roberto Oviedo Patient Status:  Inpatient    1950 MRN [id number]   Location [facility] 3SW-A Attending Sourav Katz, DO   Hosp Day # 4 PCP Jhonathan Freitas MD     Subjective:  POD #1 amputation R 5th toe for osteo.  Feels well.  No chest pain.              Intake/Output:    Intake/Output Summary (Last 24 hours) at 2024 0916  Last data filed at 2024 0847  Gross per 24 hour   Intake 1390 ml   Output 1435 ml   Net -45 ml       Wt Readings from Last 6 Encounters:   24 155 lb (70.3 kg)   23 152 lb (68.9 kg)   22 144 lb (65.3 kg)   22 160 lb (72.6 kg)   22 160 lb (72.6 kg)   22 159 lb (72.1 kg)             Physical Exam:  Blood pressure 114/42, pulse 50, temperature 98.3 °F (36.8 °C), temperature source Oral, resp. rate 20, height 5' 10\" (1.778 m), weight 155 lb (70.3 kg), SpO2 92%.  General: Alert, in no apparent distress.  HEENT: No scleral icterus.  Dentition in poor repair.  Neck: No JVD.  Bilateral carotid bruits.  Cardiac: Regular S1, S2, 2/6 aortic systolic ejection murmur, heard thru out precordium.  No rub or gallop.  Lungs: Clear anteriorly.    Abdomen: Soft, non-tender.   Extremities: Without clubbing, cyanosis or edema.   Neurologic: No focal defecits.  Skin: Right foot dressing not removed.    Laboratory/Data:  Diagnostics:   Tele: NSR.    Echo: reviewed.    Labs:        No results for input(s): \"PTP\", \"INR\" in the last 168 hours.  A1c 7.6  , HDL 59, TG 34, LDL 94.    Medications:  Reviewed.    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   Patient stated that she will take  Tylenol because her insurance does not cover the medication she was prescribed. Patient also stated that the pharmacy is trying to get in contact with Ana Palmer to  See if theres another medication the patient can be prescribed    diabetes  (HCC)    History of amputation of lesser toe, left (HCC)    Acute osteomyelitis of right foot (HCC)    History of coronary artery bypass graft    Carotid bruits    Aortic valve sclerosis    Disseminated atherosclerosis.   CAD, Hx CABG, NQMI with reported medical disease on most recent cath.  Lost to cardiology follow up since 2022.  Will need OP follow up going forward.  PVD with prior LLE toe amputation, now S/P R toe amputation for osteo.  Per podiatry.  Bilateral carotid bruits.  Will need US as OP.  Add zetia to Crestor 40 mg/day.  LDL goal <70.    Cardiology will sign off at this time.  Follow up with me in office after discharge.  Thanks.      Michael Dodson MD  2/13/2024  9:16 AM  3

## 2024-02-13 NOTE — PROGRESS NOTES
INFECTIOUS DISEASE  PROGRESS NOTE            [facility]    Roberto Oviedo Patient Status:  Inpatient    1950 MRN [id number]   [facility] 3SW-A Attending Sourav Katz,    Hosp Day # 4 PCP Jhonathan Freitas MD     Antibiotics:#4  Cefazolin    Subjective:    Surgery yesterday    Objective:  Temp:  [97.6 °F (36.4 °C)-98.5 °F (36.9 °C)] 97.6 °F (36.4 °C)  Pulse:  [50-65] 50  Resp:  [16-20] 20  BP: (114-163)/(38-87) 121/38  SpO2:  [92 %-100 %] 98 %    General: awake alert  Vital signs:Temp:  [97.6 °F (36.4 °C)-98.5 °F (36.9 °C)] 97.6 °F (36.4 °C)  Pulse:  [50-65] 50  Resp:  [16-20] 20  BP: (114-163)/(38-87) 121/38  SpO2:  [92 %-100 %] 98 %  HEENT: Moist mucous membranes. Extraocular muscles are intact.  Neck: supple no masses  Respiratory: Non labored, symmetric excursion, normal respirations  Cardiovascular: no irregularities in rhythm  Abdomen: Soft, nontender, nondistended.   Musculoskeletal: foot dressing  Pictures reviewed  Labs:     COVID-19 Lab Results    COVID-19  Lab Results   Component Value Date    COVID19 Not Detected 2022    COVID19 Not Detected 2022    COVID19 Not Detected 2021       Pro-Calcitonin  No results for input(s): \"PCT\" in the last 168 hours.    Cardiac  No results for input(s): \"TROP\", \"PBNP\" in the last 168 hours.    Creatinine Kinase  No results for input(s): \"CK\" in the last 168 hours.    Inflammatory Markers  No results for input(s): \"CRP\", \"TANISHA\", \"LDH\", \"DDIMER\" in the last 168 hours.    Recent Labs   Lab 02/10/24  0801 24  0511   RBC 3.38* 3.37*   HGB 10.3* 10.0*   HCT 31.4* 30.2*   MCV 92.9 89.6   MCH 30.5 29.7   MCHC 32.8 33.1   RDW 12.3 12.1   NEPRELIM 4.32  --    WBC 6.3 6.5   .0 240.0         Recent Labs   Lab 24  1332 02/10/24  0801 24  0511   * 193* 263*   BUN 21 24* 31*   CREATSERUM 1.14 0.96 1.00   CA 9.0 8.2* 8.3*   ALB 3.5 3.1*  --     142 138   K 4.4 4.6 4.3    112 109   CO2 27.0 27.0  26.0   ALKPHO 119* 101  --    AST 10* 6*  --    ALT 13* 12*  --    BILT 0.4 0.4  --    TP 7.3 6.5  --        No results found for: \"VANCT\"  Microbiology    Hospital Encounter on 02/09/24   1. Tissue Aerobic Culture     Status: Abnormal (Preliminary result)    Collection Time: 02/12/24 11:57 AM    Specimen: Toe; Tissue   Result Value Ref Range    Tissue Culture Result 2+ growth Staphylococcus aureus (A) N/A    Tissue Smear 2+ WBCs seen N/A    Tissue Smear No organisms seen N/A   2. Anaerobic Culture     Status: None (Preliminary result)    Collection Time: 02/12/24 11:57 AM    Specimen: Toe; Tissue   Result Value Ref Range    Anaerobic Culture Pending N/A   3. Aerobic Bacterial Culture     Status: Abnormal    Collection Time: 02/10/24 10:44 AM    Specimen: Foot; Other   Result Value Ref Range    Aerobic Culture Result  N/A     Mixture of organisms suggestive of normal skin veronica    Aerobic Culture Result 1+ growth Staphylococcus aureus (A) N/A    Aerobic Smear 2+ WBCs seen N/A    Aerobic Smear 3+ Gram positive cocci in pairs N/A       Susceptibility    Staphylococcus aureus -  (no method available)     Cefazolin  Sensitive      Clindamycin <=0.25 Sensitive      Erythromycin <=0.25 Sensitive      Gentamicin <=0.5 Sensitive      Levofloxacin 0.25 Sensitive      Oxacillin <=0.25 Sensitive      Trimethoprim/Sulfa <=10 Sensitive      Vancomycin <=0.5 Sensitive    4. Blood Culture     Status: None (Preliminary result)    Collection Time: 02/09/24  2:36 PM    Specimen: Blood,peripheral   Result Value Ref Range    Blood Culture Result No Growth 3 Days N/A         Problem list reviewed:  Patient Active Problem List   Diagnosis    Type 2 diabetes mellitus without complication, without long-term current use of insulin (HCC)    PAD (peripheral artery disease) (HCC)    Peripheral neuropathic pain    Coronary artery disease involving native heart without angina pectoris    Hyperlipidemia associated with type 2 diabetes mellitus   (HCC)    Hypertension associated with diabetes  (HCC)    Moderate episode of recurrent major depressive disorder (HCC)    History of amputation of lesser toe, left (HCC)    Acute osteomyelitis of right foot (HCC)    Hyperglycemia    History of coronary artery bypass graft    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  Osteomyelitis right 5th MT sp resection  -culture MSSA  Dw podiatry, can discharge on PO keflex, and follow up final pathology        Maximiliano Vizcarra MD, MD [facility]  [phone]

## 2024-02-14 ENCOUNTER — PATIENT OUTREACH (OUTPATIENT)
Dept: CASE MANAGEMENT | Age: 74
End: 2024-02-14

## 2024-02-14 DIAGNOSIS — M86.171 ACUTE OSTEOMYELITIS OF RIGHT FOOT (HCC): ICD-10-CM

## 2024-02-14 DIAGNOSIS — Z02.9 ENCOUNTERS FOR UNSPECIFIED ADMINISTRATIVE PURPOSE: Primary | ICD-10-CM

## 2024-02-14 PROCEDURE — 1111F DSCHRG MED/CURRENT MED MERGE: CPT

## 2024-02-14 PROCEDURE — 1159F MED LIST DOCD IN RCRD: CPT

## 2024-02-14 NOTE — PROGRESS NOTES
Initial Post Discharge Follow Up   Discharge Date: 2/13/24  Contact Date: 2/14/2024    Consent Verification:  Assessment Completed With: Other: daughter Ricarda  Permission received per patient?  written  HIPAA Verified?  Yes    Discharge Dx:   #Right fifth metatarsal osteomyelitis  #Right second phalanx osteomyelitis  #PVD  #CAD  #Hypertension  #Hyperlipidemia  #Diabetes    General:   How have you been since your discharge from the hospital? NCM spoke with pt's daughter states pt is doing fine, she just left his home. Pt denies any pain, no shortness of breath, no chest pain, no fevers, no chills, no nausea, no vomiting, no other symptoms at this time. Pt has not checked his BS this morning yet.    Do you have any pain since discharge?  No    How well was your pain managed while in the hospital?   On a scale of 1-5   1- Very Poor and 5- Very well   5  When you were leaving the hospital were your discharge instructions reviewed with you? Yes  How well were your discharge instructions explained to you?   On a scale of 1-5   1- Very Poor and 5- Very well   5  Do you have any questions about your discharge instructions?  No  Before leaving the hospital was your diagnoses explained to you? Yes  Do you have any questions about your diagnoses? No  Are you able to perform normal daily activities of living as you have prior to your hospital stay (dressing, bathing, ambulating to the bathroom, etc)? yes  (NEHAL) Was patient given a different diet per AVS? no      Medications: Reviewed medication list.  Medications are up to date.  Current Outpatient Medications   Medication Sig Dispense Refill    cephalexin 500 MG Oral Cap Take 1 capsule (500 mg total) by mouth 3 (three) times daily for 15 days. 45 capsule 0    aspirin 81 MG Oral Chew Tab Chew 1 tablet (81 mg total) by mouth daily. 30 tablet 0    ezetimibe 10 MG Oral Tab Take 1 tablet (10 mg total) by mouth nightly. 30 tablet 0    Melatonin 5 MG Oral Tab Take 1 tablet (5 mg  total) by mouth at bedtime.      LISINOPRIL 20 MG Oral Tab TAKE 1 TABLET BY MOUTH EVERY DAY 90 tablet 1    METFORMIN 500 MG Oral Tab TAKE 2 TABLETS BY MOUTH TWICE A DAY WITH MEALS 360 tablet 0    glimepiride 1 MG Oral Tab Take 1 tablet (1 mg total) by mouth daily with breakfast. *Due for labs and Annual physical, please call the office* 30 tablet 0    sertraline 100 MG Oral Tab Take 1 tablet (100 mg total) by mouth daily. 90 tablet 0    clopidogrel 75 MG Oral Tab Take 1 tablet (75 mg total) by mouth daily. 90 tablet 0    gabapentin 300 MG Oral Cap Take 1 capsule (300 mg total) by mouth nightly. 90 capsule 0    rosuvastatin 40 MG Oral Tab Take 1 tablet (40 mg total) by mouth daily. 90 tablet 3    Exenatide ER (BYDUREON) 2 MG Subcutaneous Pen-injector Inject 2 mg into the skin once a week.      Continuous Blood Gluc Sensor (FREESTYLE CLAUDE 2 SENSOR) Does not apply Misc 1 each every 14 (fourteen) days. One sensor every 14 days. 2 each 3     Were there any changes to your current medication(s) noted on the AVS? Yes  If so, were these medication changes discussed with you prior to leaving the hospital? Yes  If a new medication was prescribed:    Was the new medication's purpose & side effects reviewed? Yes  Do you have any questions about your new medication? No  Did you  your discharge medications when you left the hospital? Yes  Let's go over your medications together to make sure we are not missing anything. Medications Reviewed  Are there any reasons that keep you from taking your medication as prescribed? No  Are you having any concerns with constipation? No  Did patient receive their flu shot (Sept-March)? No    Discharge medications reviewed/discussed/and reconciled against outpatient medications with patient.  Any changes or updates to medications sent to PCP.  Patient Acknowledged     Referrals/orders at D/C:  Referrals/orders placed at D/C? no    DME ordered at D/C? No      Discharge orders, AVS reviewed  and discussed with patient. Any changes or updates to orders sent to PCP.  Patient Acknowledged      SDOH:   Transportation Needs: No Transportation Needs (2/9/2024)    Transportation Needs     Lack of Transportation: No     Financial Resource Strain: Low Risk  (2/14/2024)    Financial Resource Strain     Difficulty of Paying Living Expenses: Not hard at all     Med Affordability: No         Follow up appointments:      Your appointments       Date & Time Appointment Department ([place])    Feb 23, 2024  2:30 PM CST Hospital Follow Up with Mirlande Capone APRN [address] ([facility] [address])        Feb 27, 2024  3:15 PM CST Post Op Visit with Jayesh Richardson DPM [facility] [place] [place] ([facility] 3)        Mar 26, 2024  9:00 AM CDT Diabetes Pump follow up with Jaja Navas APRN [address] ([facility] [place])              [address]  [facility] [place]  [address] [place] [address] [address]  [facility] [address]  [phone] [address]  [address] [facility] [address]  [facility] [address]  [phone] [facility] 3  [address] [facility] [address]  [facility] [id number] [phone]            TCC  Was TCC ordered: Yes  Was TCC scheduled: No, Explain: pt's daughter declined.       PCP (If no TCC appointment)  Does patient already have a PCP appointment scheduled? Yes  NCM Confirmed PCP office TCM appointment with patient      Specialist    Does the patient have any other follow up appointment(s) needing to be scheduled? No  If yes: NCM reviewed upcoming specialist appointment with patient: Yes  Does the patient need assistance scheduling appointment(s): No    Is there any reason  as to why you cannot make your appointment(s)?  No     Needs post D/C:   Now that you are home, are there any needs or concerns you need addressed before your next visit with your PCP?  (DME, meds, questions, etc.): No    Interventions by NCM:   All discharge instructions reviewed with the patient. Reviewed when to call MD vs when to call 911 or go the ED. Educated patient on the importance of taking all meds as prescribed as well as close f/u with PCP/specialists. Pt verbalized understanding and will contact the office with any further questions or concerns. Patient denies fevers, chills, nausea, vomiting, shortness of breath, chest pain, or any other symptoms at this time.  NCM confirmed appointment. NCM provided contact information for any further questions/concerns. Patient verbalized understanding and agreeable.       Overall Rating:   How would you rate the care you received while in the hospital? good    CCM referral placed:    No    BOOK BY DATE: 02/27/24

## 2024-02-14 NOTE — TELEPHONE ENCOUNTER
Called and s/w pt daughter, Ricarda(on HIPAA) and offered to schedule pt appt. She needs afternoon appt d/t being a teacher. Appt scheduled on 2/27 at 315pm. She has the address.

## 2024-02-19 ENCOUNTER — TELEMEDICINE (OUTPATIENT)
Dept: INTERNAL MEDICINE CLINIC | Facility: CLINIC | Age: 74
End: 2024-02-19
Payer: COMMERCIAL

## 2024-02-19 DIAGNOSIS — I73.9 PAD (PERIPHERAL ARTERY DISEASE) (HCC): ICD-10-CM

## 2024-02-19 DIAGNOSIS — Z89.421 STATUS POST AMPUTATION OF LESSER TOE OF RIGHT FOOT (HCC): ICD-10-CM

## 2024-02-19 DIAGNOSIS — M86.171 ACUTE OSTEOMYELITIS OF RIGHT FOOT (HCC): ICD-10-CM

## 2024-02-19 DIAGNOSIS — E11.65 TYPE 2 DIABETES MELLITUS WITH HYPERGLYCEMIA, WITHOUT LONG-TERM CURRENT USE OF INSULIN (HCC): Primary | ICD-10-CM

## 2024-02-19 DIAGNOSIS — E11.42 DIABETIC POLYNEUROPATHY ASSOCIATED WITH TYPE 2 DIABETES MELLITUS (HCC): ICD-10-CM

## 2024-02-19 PROCEDURE — 99496 TRANSJ CARE MGMT HIGH F2F 7D: CPT | Performed by: PHYSICIAN ASSISTANT

## 2024-02-19 PROCEDURE — 1159F MED LIST DOCD IN RCRD: CPT | Performed by: PHYSICIAN ASSISTANT

## 2024-02-19 PROCEDURE — 1111F DSCHRG MED/CURRENT MED MERGE: CPT | Performed by: PHYSICIAN ASSISTANT

## 2024-02-19 PROCEDURE — 1160F RVW MEDS BY RX/DR IN RCRD: CPT | Performed by: PHYSICIAN ASSISTANT

## 2024-02-19 NOTE — PROGRESS NOTES
Subjective:   Roberto Oviedo is a 73 year old male who presents for hospital follow up.   This video visit was conducted using live, audio and video feed.   He was discharged from Inpatient hospital, [facility] to Home   Admit Date: 2/9/24  Discharge Date: 2/13/24  Hospital Discharge Diagnosis:   #Right fifth metatarsal osteomyelitis  #Right second phalanx osteomyelitis  #PVD  #CAD  #Hypertension  #Hyperlipidemia  #Diabetes    Interactive contact within 2 business days post discharge first initiated on Date: 2/14/2024    During the visit, the following was completed:  Obtained and reviewed discharge summary, continuity of care documents and Hospitalist notes  Reviewed Labs (CBC, CMP) and Operative reports: Surgery    HPI:    Brief Synopsis: Vascular surgery, podiatry, ID were consulted.  Patient was started on IV antibiotics.  Patient underwent partial fifth ray amputation.  Cardiology was consulted for cardiac clearance.  Patient was discharged home with outpatient follow-up.    Pt states he feels well since discharge  Trying to get up and about, uses walker sometimes but he admits not always. No recent falls.   Has appt next week with podiatry. Keeping foot bandaged per wound instructions. Taking keflex as directed.  Fasting blood sugar 140-150 sometimes up to 200, takes DM meds as directed. Will see DM clinic next month.    History/Other:   Current Medications:  Medication Reconciliation:  I am aware of an inpatient discharge within the last 30 days.  The discharge medication list has been reconciled with the patient's current medication list and reviewed by me.  See medication list for additions of new medication, and changes to current doses of medications and discontinued medications.  No outpatient medications have been marked as taking for the 2/19/24 encounter (Telemedicine) with Gianna Fajardo PA-C.       Review of Systems:  GENERAL: weight stable, energy stable, no sweating  SKIN: denies any  unusual skin lesions  EYES: denies blurred vision or double vision  HEENT: denies nasal congestion, sinus pain or ST  LUNGS: denies shortness of breath with exertion  CARDIOVASCULAR: denies chest pain on exertion or palpitations  GI: denies abdominal pain, denies heartburn, denies diarrhea  MUSCULOSKELETAL: denies pain, normal range of motion of extremities  NEURO: denies headaches, denies dizziness, denies weakness  PSYCHE: denies depression or anxiety  HEMATOLOGIC: denies hx of anemia, or bruising, denies bleeding  ENDOCRINE: denies thyroid history  ALL/ASTHMA: denies hx of allergy or asthma    Objective:   No LMP for male patient.  Estimated body mass index is 22.24 kg/m² as calculated from the following:    Height as of 2/9/24: 5' 10\" (1.778 m).    Weight as of 2/9/24: 155 lb (70.3 kg).   There were no vitals taken for this visit.      PHYSICAL EXAM:  Vitals are patient-reported on video visit    GENERAL pt appears well via video, no acute distress  HEENT: normocephalic, atraumatic no swelling, voice is normal  SKIN: no rash or diaphoresis noted  PULM: no respiratory distress, cough, or wheezing are apparent  NEURO: a/ox3  PSYCH: mood and affect normal     Assessment & Plan:   1. Type 2 diabetes mellitus with hyperglycemia, without long-term current use of insulin ([facility]) (Primary)  Reviewed importance of better glycemic control. Track blood sugar daily continue current meds, see DM clinic next month as planned  2. PAD (peripheral artery disease) ([facility])- consulted by vascular surgery inpatient, cleared for amputation, reported palpable pedal pulses at the time.  3. 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 Acute osteomyelitis of right foot ([facility])- ID consulted, finish keflex  5. Diabetic polyneuropathy associated with type 2 diabetes mellitus ([facility])- no pain, pt aware of precautions due to decreased sensation, and need for better glycemic control.  Continue gabapentin.    Pt and daughter expresses understanding and agrees to the plan.         Return in about 3 months (around 5/19/2024) for routine physical.

## 2024-02-20 PROBLEM — R73.9 HYPERGLYCEMIA: Status: RESOLVED | Noted: 2024-02-09 | Resolved: 2024-02-20

## 2024-02-20 PROBLEM — E11.42 DIABETIC POLYNEUROPATHY ASSOCIATED WITH TYPE 2 DIABETES MELLITUS (HCC): Status: ACTIVE | Noted: 2024-02-20

## 2024-02-27 ENCOUNTER — OFFICE VISIT (OUTPATIENT)
Dept: PODIATRY CLINIC | Facility: CLINIC | Age: 74
End: 2024-02-27
Payer: COMMERCIAL

## 2024-02-27 DIAGNOSIS — M86.171 ACUTE OSTEOMYELITIS OF RIGHT FOOT (HCC): Primary | ICD-10-CM

## 2024-02-27 DIAGNOSIS — L84 FOOT CALLUS: ICD-10-CM

## 2024-02-27 DIAGNOSIS — M77.42 METATARSALGIA, LEFT FOOT: ICD-10-CM

## 2024-02-27 DIAGNOSIS — I73.9 PAD (PERIPHERAL ARTERY DISEASE) (HCC): ICD-10-CM

## 2024-02-27 DIAGNOSIS — Z89.422 HISTORY OF AMPUTATION OF LESSER TOE, LEFT (HCC): ICD-10-CM

## 2024-02-27 PROCEDURE — 1160F RVW MEDS BY RX/DR IN RCRD: CPT | Performed by: STUDENT IN AN ORGANIZED HEALTH CARE EDUCATION/TRAINING PROGRAM

## 2024-02-27 PROCEDURE — 1111F DSCHRG MED/CURRENT MED MERGE: CPT | Performed by: STUDENT IN AN ORGANIZED HEALTH CARE EDUCATION/TRAINING PROGRAM

## 2024-02-27 PROCEDURE — 99024 POSTOP FOLLOW-UP VISIT: CPT | Performed by: STUDENT IN AN ORGANIZED HEALTH CARE EDUCATION/TRAINING PROGRAM

## 2024-02-27 PROCEDURE — 1159F MED LIST DOCD IN RCRD: CPT | Performed by: STUDENT IN AN ORGANIZED HEALTH CARE EDUCATION/TRAINING PROGRAM

## 2024-02-27 PROCEDURE — 1126F AMNT PAIN NOTED NONE PRSNT: CPT | Performed by: STUDENT IN AN ORGANIZED HEALTH CARE EDUCATION/TRAINING PROGRAM

## 2024-02-28 NOTE — PROGRESS NOTES
[facility]  Progress Note    Roberto Oviedo is a 73 year old male.   Chief Complaint   Patient presents with    Post-Op     1st- right foot- patient denies pain          HPI:     Patient is a pleasant 73-year-old male with history of diabetes and PAD who presents to clinic for postop visit status post right partial fifth ray amputation for treatment of osteomyelitis (DOS: 2/13/24).  He has dressings intact and is without pain at this time.  He has been ambulate with postop shoes to right and left feet.  He has been taking Keflex as prescribed and is nearly completed medication.  No other complaints are mentioned.      Allergies: Patient has no known allergies.   Current Outpatient Medications   Medication Sig Dispense Refill    cephalexin 500 MG Oral Cap Take 1 capsule (500 mg total) by mouth 3 (three) times daily for 15 days. 45 capsule 0    aspirin 81 MG Oral Chew Tab Chew 1 tablet (81 mg total) by mouth daily. 30 tablet 0    ezetimibe 10 MG Oral Tab Take 1 tablet (10 mg total) by mouth nightly. 30 tablet 0    Melatonin 5 MG Oral Tab Take 1 tablet (5 mg total) by mouth at bedtime.      LISINOPRIL 20 MG Oral Tab TAKE 1 TABLET BY MOUTH EVERY DAY 90 tablet 1    METFORMIN 500 MG Oral Tab TAKE 2 TABLETS BY MOUTH TWICE A DAY WITH MEALS 360 tablet 0    glimepiride 1 MG Oral Tab Take 1 tablet (1 mg total) by mouth daily with breakfast. *Due for labs and Annual physical, please call the office* 30 tablet 0    sertraline 100 MG Oral Tab Take 1 tablet (100 mg total) by mouth daily. 90 tablet 0    clopidogrel 75 MG Oral Tab Take 1 tablet (75 mg total) by mouth daily. 90 tablet 0    gabapentin 300 MG Oral Cap Take 1 capsule (300 mg total) by mouth nightly. 90 capsule 0    rosuvastatin 40 MG Oral Tab Take 1 tablet (40 mg total) by mouth daily. 90 tablet 3    Exenatide ER (BYDUREON) 2 MG Subcutaneous Pen-injector Inject 2 mg into the skin once a week.      Continuous Blood Gluc Sensor (FREESTYLE CLAUDE 2 SENSOR)  Does not apply Misc 1 each every 14 (fourteen) days. One sensor every 14 days. 2 each 3      Past Medical History:   Diagnosis Date    Diabetes (HCC)     Essential hypertension     Heart attack (HCC)       Past Surgical History:   Procedure Laterality Date    EYE SURGERY  07/21/2022    OPEN HEART SURGICAL PROFILE        History reviewed. No pertinent family history.   Social History     Socioeconomic History    Marital status:    Tobacco Use    Smoking status: Never    Smokeless tobacco: Never   Vaping Use    Vaping Use: Never used   Substance and Sexual Activity    Alcohol use: Not Currently    Drug use: Never           REVIEW OF SYSTEMS:     Today reviewed systems as documented below  GENERAL HEALTH: feels well otherwise  SKIN: denies any unusual skin lesions or rashes  RESPIRATORY: denies shortness of breath with exertion  CARDIOVASCULAR: denies chest pain on exertion  GI: denies abdominal pain and denies heartburn  NEURO: denies headaches  MUSCULO: denies arthritis, back pain      EXAM:   There were no vitals taken for this visit.  GENERAL: well developed, well nourished, in no apparent distress  EXTREMITIES:   1. Integument: Normal skin temperature and turgor.  Right foot incision is well coapted sutures intact.  Preulcerative HPK noted subleft fifth metatarsal base.  Nails x 10 are elongated and thickened.  2. Vascular: Pedal pulses are lightly palpable.   3. Musculoskeletal: All muscle groups are graded 5 out of 5 in the foot and ankle.  Status post second toe amputation of left foot.  Status post partial fifth ray amputation of right foot.  Prominent left fifth metatarsal base.  Compartments are soft and compressible.   4. Neurological: Normal sharp dull sensation; reflexes normal.  Decreased protective sensation noted to lower extremities.          ASSESSMENT AND PLAN:   Diagnoses and all orders for this visit:    Acute osteomyelitis of right foot (HCC)    History of amputation of lesser toe, left  (HCC)    PAD (peripheral artery disease) (HCC)    Foot callus    Metatarsalgia, left foot        Plan:    -status post right partial fifth ray amputation for treatment of osteomyelitis (DOS: 2/13/24).  -Site inspected.  Noted to be healing well without concerns at this time.  Sutures removed with sterile suture we will get without incident.  Site dressed with Betadine and dry dressing.  After 2 days can remove outer dressings and get surgical site wet.  Should not soak or scrub and should gently pat site dry.  -IntraOp pathology shows viable margins.  Patient to finish Keflex as prescribed.  -Pared preulcerative callus to left foot to mostly healthy tissue without incident.  Continue to monitor site for breakdown or other concerns.  -RTC 2 weeks for reevaluation.  Patient to seek immediate medical attention any acute signs infection or other concerns arise in the meantime.        The patient indicates understanding of these issues and agrees to the plan.        Jayesh Richardson DPM    Dragon speech recognition software was used to prepare this note.  Errors in word recognition may occur.  Please contact me with any questions/concerns with this note.

## 2024-02-29 ENCOUNTER — TELEPHONE (OUTPATIENT)
Dept: ENDOCRINOLOGY CLINIC | Facility: CLINIC | Age: 74
End: 2024-02-29

## 2024-02-29 NOTE — TELEPHONE ENCOUNTER
Received fax from [facility] requesting ov notes    Your appointments       Date & Time Appointment Department ([place])    Mar 12, 2024  3:00 PM CDT Post Op Visit with Jayesh Richardson DPM [facility] ([facility] [place] 3)        Mar 26, 2024  9:00 AM CDT Diabetes Pump follow up with Jaja Navas APRN [address] ([facility] [place])              [address]  [facility] [place]  [address] [place] [address] [address]  [facility] [address] [phone] [facility] 3  [address] [facility] [address]  [facility] [address] [phone]          Faxed over last 2 notes via epic to [phone] but pt hasn't been seen in over a year

## 2024-03-01 NOTE — TELEPHONE ENCOUNTER
Received fax from [facility] for OV notes with in 3-6 months need OV notes faxed yesterday from 2/1/23 over a year ago not being accepted     Cx appt dates:  10/26/23  11/28/23  1/02/24  1/30/24  2/14/24    Patient was reached out too for r/s 2/7/24 in TE patient did get labs done should appt be moved closer or leave up coming appt?     Future Appointments   Date Time Provider Department Center   3/12/2024  3:00 PM Jayesh Richardson, JAZMÍN [id number]   3/26/2024  9:00 AM Jaja Navas, APRN [id number]

## 2024-03-01 NOTE — TELEPHONE ENCOUNTER
Amanda Ritchie, RN  [facility]; Jaja Navas APRN6 minutes ago (11:05 AM)       [facility]- Was wondering if you guys would be able to sign of on orders for the patient? They need updated chart notes, but he has not been seen in our office in a year, we can fax the orders if you want to look at it? We would just need a fax number, thank you!    Jose   Awaiting fax.

## 2024-03-01 NOTE — TELEPHONE ENCOUNTER
Please reach out to pt and daughter and inform them we cannot sign of on his DME orders without follow up visits. If pt wants care transferred back to PCP can do so - otherwise needs to be seen ASAP. Further missed visits will result in warning letter

## 2024-03-01 NOTE — TELEPHONE ENCOUNTER
Called daughter- they cannot schedule anything else sooner with our office at this time as she is a teacher and that is when she will have her spring break and can come to the appointment. Daughter requested that we send the orders to PCP since he is starting to get low, confirmed that he follows up with Dr. Freitas's office. Contacting office @[phone] to get fax number- routed TE to see if they can sign and provide a fax number    Future Appointments   Date Time Provider Department Center   3/12/2024  3:00 PM Jayesh Richardson DPM [id number]   3/26/2024  9:00 AM Jaja Navas APRN [id number]

## 2024-03-11 DIAGNOSIS — E11.9 TYPE 2 DIABETES MELLITUS WITHOUT COMPLICATION, WITHOUT LONG-TERM CURRENT USE OF INSULIN (HCC): ICD-10-CM

## 2024-03-11 NOTE — TELEPHONE ENCOUNTER
Received fax from [facility] for refill of glimepiride, pended  Requested Prescriptions     Pending Prescriptions Disp Refills    glimepiride 1 MG Oral Tab 30 tablet 0     Sig: Take 1 tablet (1 mg total) by mouth daily with breakfast. *Due for labs and Annual physical, please call the office*     Your appointments       Date & Time Appointment Department ([place])    Mar 12, 2024  3:00 PM CDT Post Op Visit with Jayesh Richardson DPM [facility] ([facility] 3)        Mar 26, 2024  9:00 AM CDT Diabetes Pump follow up with Jaja Navas APRN [address] ([facility] [place])              [address]  [facility] [place]  [address] [place] [address] 201  [facility] [id number] [phone] [facility] 3  [address] [facility] [address]  [facility] [id number] [phone]          Last A1c value was 7.6% done 2/9/2024.    Refill 12/19/23  LOV 2/1/23

## 2024-03-12 ENCOUNTER — TELEPHONE (OUTPATIENT)
Dept: PODIATRY CLINIC | Facility: CLINIC | Age: 74
End: 2024-03-12

## 2024-03-12 DIAGNOSIS — E11.9 TYPE 2 DIABETES MELLITUS WITHOUT COMPLICATION, WITHOUT LONG-TERM CURRENT USE OF INSULIN (HCC): ICD-10-CM

## 2024-03-12 RX ORDER — GLIMEPIRIDE 1 MG/1
1 TABLET ORAL
Qty: 30 TABLET | Refills: 0 | OUTPATIENT
Start: 2024-03-12

## 2024-03-12 RX ORDER — GLIMEPIRIDE 1 MG/1
1 TABLET ORAL
Qty: 30 TABLET | Refills: 0 | Status: SHIPPED | OUTPATIENT
Start: 2024-03-12

## 2024-03-12 NOTE — TELEPHONE ENCOUNTER
S/w daughter of patient    Patient missed post-op appointment today because daughter is the transportation for patient and she is a teacher. She states that foot looks good and is wondering if we can push back appointment to last week in march because she is a teacher and is on spring break.     Please advise if that is acceptable to follow up in 4 weeks rather than 2 weeks for 2nd postop visit

## 2024-03-12 NOTE — TELEPHONE ENCOUNTER
Per daughter had to cancel pts post op appt for today, states she transports pt and is unable to get off work, asking for appt sooner than next available. Please advise thank you.

## 2024-03-12 NOTE — TELEPHONE ENCOUNTER
Requested Prescriptions     Pending Prescriptions Disp Refills    GLIMEPIRIDE 1 MG Oral Tab [Pharmacy Med Name: GLIMEPIRIDE 1 MG TABLET] 30 tablet 0     Sig: TAKE 1 TABLET (1 MG TOTAL) BY MOUTH DAILY WITH BREAKFAST. *DUE FOR LABS AND ANNUAL PHYSICAL, PLEASE CALL THE OFFICE*     Your appointments       Date & Time Appointment Department (Center)    Mar 12, 2024  3:00 PM CDT Post Op Visit with Jayesh Richardson DPM [facility] ([place] [address])        Mar 26, 2024  9:00 AM CDT Diabetes Pump follow up with Jaja Navas APRN [address] ([facility] [place])              [address]  [facility] [place]  [address] [place] [address] 201  [facility] [id number] [phone] [facility] 3  [address] [facility] [address]  [facility] [id number] [phone]          Last A1c value was 7.6% done 2/9/2024.    Refill 12/19/23  LOV 2/1/23

## 2024-03-24 DIAGNOSIS — M79.2 PERIPHERAL NEUROPATHIC PAIN: ICD-10-CM

## 2024-03-24 RX ORDER — GABAPENTIN 300 MG/1
300 CAPSULE ORAL NIGHTLY
Qty: 90 CAPSULE | Refills: 0 | Status: SHIPPED | OUTPATIENT
Start: 2024-03-24

## 2024-03-26 DIAGNOSIS — I25.10 CORONARY ARTERY DISEASE INVOLVING NATIVE HEART WITHOUT ANGINA PECTORIS, UNSPECIFIED VESSEL OR LESION TYPE: ICD-10-CM

## 2024-03-26 DIAGNOSIS — E11.9 TYPE 2 DIABETES MELLITUS WITHOUT COMPLICATION, WITHOUT LONG-TERM CURRENT USE OF INSULIN (HCC): ICD-10-CM

## 2024-03-26 RX ORDER — ROSUVASTATIN CALCIUM 40 MG/1
40 TABLET, COATED ORAL DAILY
Qty: 90 TABLET | Refills: 3 | Status: SHIPPED | OUTPATIENT
Start: 2024-03-26

## 2024-03-26 RX ORDER — CLOPIDOGREL BISULFATE 75 MG/1
75 TABLET ORAL DAILY
Qty: 90 TABLET | Refills: 0 | Status: SHIPPED | OUTPATIENT
Start: 2024-03-26

## 2024-03-26 NOTE — TELEPHONE ENCOUNTER
Clopidogrel 75 MG  Last time medication was refilled 10/23/2023  Last OV 02/19/2024  Next OV due/scheduled No Future Appointments    Medication not on protocol.      Rosuvastatin 40 MG  Last time medication was refilled 12/27/2022  Last OV 02/19/2024  Next OV due/scheduled No Future Appointments    Medication failed protocol.

## 2024-03-29 ENCOUNTER — OFFICE VISIT (OUTPATIENT)
Dept: PODIATRY CLINIC | Facility: CLINIC | Age: 74
End: 2024-03-29

## 2024-03-29 DIAGNOSIS — B35.1 ONYCHOMYCOSIS: ICD-10-CM

## 2024-03-29 DIAGNOSIS — F32.0 MILD MAJOR DEPRESSION (HCC): ICD-10-CM

## 2024-03-29 DIAGNOSIS — Z89.422 HISTORY OF AMPUTATION OF LESSER TOE, LEFT (HCC): Primary | ICD-10-CM

## 2024-03-29 DIAGNOSIS — L84 FOOT CALLUS: ICD-10-CM

## 2024-03-29 DIAGNOSIS — M77.42 METATARSALGIA, LEFT FOOT: ICD-10-CM

## 2024-03-29 DIAGNOSIS — E11.42 DIABETIC POLYNEUROPATHY ASSOCIATED WITH TYPE 2 DIABETES MELLITUS (HCC): ICD-10-CM

## 2024-03-29 DIAGNOSIS — I73.9 PAD (PERIPHERAL ARTERY DISEASE) (HCC): ICD-10-CM

## 2024-03-29 PROCEDURE — 1160F RVW MEDS BY RX/DR IN RCRD: CPT | Performed by: STUDENT IN AN ORGANIZED HEALTH CARE EDUCATION/TRAINING PROGRAM

## 2024-03-29 PROCEDURE — 99024 POSTOP FOLLOW-UP VISIT: CPT | Performed by: STUDENT IN AN ORGANIZED HEALTH CARE EDUCATION/TRAINING PROGRAM

## 2024-03-29 PROCEDURE — 1126F AMNT PAIN NOTED NONE PRSNT: CPT | Performed by: STUDENT IN AN ORGANIZED HEALTH CARE EDUCATION/TRAINING PROGRAM

## 2024-03-29 PROCEDURE — 1159F MED LIST DOCD IN RCRD: CPT | Performed by: STUDENT IN AN ORGANIZED HEALTH CARE EDUCATION/TRAINING PROGRAM

## 2024-03-29 PROCEDURE — 99213 OFFICE O/P EST LOW 20 MIN: CPT | Performed by: STUDENT IN AN ORGANIZED HEALTH CARE EDUCATION/TRAINING PROGRAM

## 2024-03-29 RX ORDER — SERTRALINE HYDROCHLORIDE 100 MG/1
100 TABLET, FILM COATED ORAL DAILY
Qty: 90 TABLET | Refills: 0 | Status: SHIPPED | OUTPATIENT
Start: 2024-03-29

## 2024-03-29 NOTE — PROGRESS NOTES
[facility] Podiatry  Progress Note    Roberto Oviedo is a 73 year old male.   Chief Complaint   Patient presents with    Post-Op     Right foot- patient denies pain          HPI:     Patient is a pleasant 73-year-old male with history of diabetes and PAD who presents to clinic for postop visit status post right partial fifth ray amputation for treatment of osteomyelitis (DOS: 2/13/24).  Surgical site is well-healed.  He does have elongated and thickened nails he has difficulty trimming on his own.  He is without pain at this time.  No other complaints are mentioned.        Allergies: Patient has no known allergies.   Current Outpatient Medications   Medication Sig Dispense Refill    ROSUVASTATIN 40 MG Oral Tab TAKE 1 TABLET BY MOUTH EVERY DAY 90 tablet 3    CLOPIDOGREL 75 MG Oral Tab TAKE 1 TABLET BY MOUTH EVERY DAY 90 tablet 0    GABAPENTIN 300 MG Oral Cap TAKE 1 CAPSULE BY MOUTH EVERY DAY AT NIGHT 90 capsule 0    glimepiride 1 MG Oral Tab Take 1 tablet (1 mg total) by mouth daily with breakfast. *Due for labs and Annual physical, please call the office* 30 tablet 0    Melatonin 5 MG Oral Tab Take 1 tablet (5 mg total) by mouth at bedtime.      LISINOPRIL 20 MG Oral Tab TAKE 1 TABLET BY MOUTH EVERY DAY 90 tablet 1    METFORMIN 500 MG Oral Tab TAKE 2 TABLETS BY MOUTH TWICE A DAY WITH MEALS 360 tablet 0    Exenatide ER (BYDUREON) 2 MG Subcutaneous Pen-injector Inject 2 mg into the skin once a week.      Continuous Blood Gluc Sensor (FREESTYLE CLAUDE 2 SENSOR) Does not apply Misc 1 each every 14 (fourteen) days. One sensor every 14 days. 2 each 3    sertraline 100 MG Oral Tab Take 1 tablet (100 mg total) by mouth daily. 90 tablet 0      Past Medical History:   Diagnosis Date    Diabetes (HCC)     Essential hypertension     Heart attack (HCC)       Past Surgical History:   Procedure Laterality Date    EYE SURGERY  07/21/2022    OPEN HEART SURGICAL PROFILE        History reviewed. No pertinent family history.    Social History     Socioeconomic History    Marital status:    Tobacco Use    Smoking status: Never    Smokeless tobacco: Never   Vaping Use    Vaping Use: Never used   Substance and Sexual Activity    Alcohol use: Not Currently    Drug use: Never           REVIEW OF SYSTEMS:     Today reviewed systems as documented below  GENERAL HEALTH: feels well otherwise  SKIN: denies any unusual skin lesions or rashes  RESPIRATORY: denies shortness of breath with exertion  CARDIOVASCULAR: denies chest pain on exertion  GI: denies abdominal pain and denies heartburn  NEURO: denies headaches  MUSCULO: denies arthritis, back pain      EXAM:   There were no vitals taken for this visit.  GENERAL: well developed, well nourished, in no apparent distress  EXTREMITIES:   1. Integument: Normal skin temperature and turgor.  Right foot incision is well healed.  Remaining nails are elongated and thickened.  HPK noted subleft fifth metatarsal base.   2. Vascular: Pedal pulses are lightly palpable.   3. Musculoskeletal: All muscle groups are graded 5 out of 5 in the foot and ankle.  Status post second toe amputation of left foot.  Status post partial fifth ray amputation of right foot.  Prominent left fifth metatarsal base.  Compartments are soft and compressible.   4. Neurological: Normal sharp dull sensation; reflexes normal.  Decreased protective sensation noted to lower extremities.          ASSESSMENT AND PLAN:   Diagnoses and all orders for this visit:    History of amputation of lesser toe, left ([facility])    PAD (peripheral artery disease) ([facility])    Foot callus    Metatarsalgia, left foot    Diabetic polyneuropathy associated with type 2 diabetes mellitus ([facility])    Onychomycosis          Plan:    -status post right partial fifth ray amputation for treatment of osteomyelitis (DOS: 2/13/24).  -Surgical site is well-healed.  Patient has been discharged from surgical care.  No restrictions at this time.  -Sharply debrided remaining  nails with nail nipper without incident.  Nails smoothed with Dremel.  -Pared HPK x 1 with #15 blade to healthy tissue without incident.  -Check feet daily and follow-up immediately if any concerns arise.  -Can ambulate with postop shoes or diabetic shoes and inserts.  -Patient seek immediate medical attention if any acute signs of infection or other concerns arise.  Can follow-up in 2 months routine footcare or sooner if other concerns arise.      The patient indicates understanding of these issues and agrees to the plan.        Jayesh Richardson DPM    Dragon speech recognition software was used to prepare this note.  Errors in word recognition may occur.  Please contact me with any questions/concerns with this note.

## 2024-05-11 DIAGNOSIS — E11.9 TYPE 2 DIABETES MELLITUS WITHOUT COMPLICATION, WITHOUT LONG-TERM CURRENT USE OF INSULIN (HCC): ICD-10-CM

## 2024-05-17 ENCOUNTER — HOSPITAL ENCOUNTER (EMERGENCY)
Facility: HOSPITAL | Age: 74
Discharge: LEFT BEFORE DISCHARGE | End: 2024-05-17

## 2024-05-19 DIAGNOSIS — E11.9 TYPE 2 DIABETES MELLITUS WITHOUT COMPLICATION, WITHOUT LONG-TERM CURRENT USE OF INSULIN (HCC): ICD-10-CM

## 2024-05-20 NOTE — TELEPHONE ENCOUNTER
Requested Prescriptions     Pending Prescriptions Disp Refills    GLIMEPIRIDE 1 MG Oral Tab [Pharmacy Med Name: GLIMEPIRIDE 1 MG TABLET] 30 tablet 0     Sig: TAKE 1 TABLET (1 MG TOTAL) BY MOUTH DAILY WITH BREAKFAST. *DUE FOR LABS AND ANNUAL PHYSICAL, PLEASE CALL THE OFFICE*     Your appointments       Date & Time Appointment Department ([place])    May 29, 2024 9:15 AM CDT Follow Up Visit with Jayesh Richardson DPM [facility] ([place] [address])              [address] [address]  [phone]          Last A1c value was 7.6% done 2/9/2024.    Refill 3/12/24  LOV 2/1/23, RTC 5m  No FU, [facility] sent

## 2024-05-23 RX ORDER — GLIMEPIRIDE 1 MG/1
1 TABLET ORAL
Qty: 30 TABLET | Refills: 2 | Status: SHIPPED | OUTPATIENT
Start: 2024-05-23

## 2024-05-23 NOTE — TELEPHONE ENCOUNTER
Pt's daughter called - appt scheduled  Your appointments       Date & Time Appointment Department ([place])    May 29, 2024 9:15 AM CDT Follow Up Visit with Jayesh Richardson DPM [facility] ([facility] [place] 3)        Jul 24, 2024 9:00 AM CDT Diabetes Pump follow up with Jaja Navas APRN [address] ([facility] [place])              [address]  [facility] [place]  [address] [place] [address] 201  [facility] [address]  [phone] [facility] 3  [address] [facility] 116  [facility] [address]  [phone]

## 2024-06-29 DIAGNOSIS — E11.9 TYPE 2 DIABETES MELLITUS WITHOUT COMPLICATION, WITHOUT LONG-TERM CURRENT USE OF INSULIN (HCC): ICD-10-CM

## 2024-07-01 RX ORDER — GLIMEPIRIDE 1 MG/1
1 TABLET ORAL
Qty: 90 TABLET | Refills: 2 | Status: SHIPPED | OUTPATIENT
Start: 2024-07-01

## 2024-07-01 NOTE — TELEPHONE ENCOUNTER
Requested Prescriptions     Pending Prescriptions Disp Refills    GLIMEPIRIDE 1 MG Oral Tab [Pharmacy Med Name: GLIMEPIRIDE 1 MG TABLET] 90 tablet 2     Sig: TAKE 1 TABLET (1 MG TOTAL) BY MOUTH DAILY WITH BREAKFAST. *DUE FOR LABS AND ANNUAL PHYSICAL, PLEASE CALL THE OFFICE*     Your appointments       Date & Time Appointment Department ([place])    Jul 18, 2024 3:00 PM CDT Follow Up Visit with Jayesh Richardson DPM [facility] ([facility] 3)        Jul 24, 2024 9:00 AM CDT Diabetes Pump follow up with Jaja Navas APRN [address] ([facility] [place])              [address]  [facility] [place]  [address] [place] [address] 201  [facility] [id number] [phone] [facility] [place] 3  [address] [facility] 116  [facility] [id number] [phone]          Last A1c value was 7.6% done 2/9/2024.    Refill 5/23/24  LOV 2/1/23

## 2024-07-28 DIAGNOSIS — F32.0 MILD MAJOR DEPRESSION (HCC): ICD-10-CM

## 2024-07-28 RX ORDER — SERTRALINE HYDROCHLORIDE 100 MG/1
100 TABLET, FILM COATED ORAL DAILY
Qty: 90 TABLET | Refills: 0 | Status: SHIPPED | OUTPATIENT
Start: 2024-07-28

## 2024-07-28 NOTE — TELEPHONE ENCOUNTER
Last time medication was refilled: 3/29/24  Next office visit due/scheduled: 8/5/24  Last office visit: 2/19/24  Last Labs: 2/12/24

## 2024-09-24 ENCOUNTER — TELEPHONE (OUTPATIENT)
Dept: ENDOCRINOLOGY CLINIC | Facility: CLINIC | Age: 74
End: 2024-09-24

## 2024-09-24 NOTE — TELEPHONE ENCOUNTER
Received fax for [facility] for OV notes needing to be sent in [facility] no longer with clinic and patient was last seen with [facility] 2/2023 as telemed.   Called [facility] spoke to rep [phone] was then transferred over to the new pt team profile to get account canceled and closed.

## 2024-10-09 ENCOUNTER — OFFICE VISIT (OUTPATIENT)
Dept: INTERNAL MEDICINE CLINIC | Facility: CLINIC | Age: 74
End: 2024-10-09
Payer: COMMERCIAL

## 2024-10-09 VITALS
TEMPERATURE: 97 F | OXYGEN SATURATION: 99 % | HEART RATE: 71 BPM | RESPIRATION RATE: 16 BRPM | WEIGHT: 171 LBS | DIASTOLIC BLOOD PRESSURE: 78 MMHG | BODY MASS INDEX: 24.48 KG/M2 | HEIGHT: 70 IN | SYSTOLIC BLOOD PRESSURE: 114 MMHG

## 2024-10-09 DIAGNOSIS — Z23 NEED FOR INFLUENZA VACCINATION: ICD-10-CM

## 2024-10-09 DIAGNOSIS — E78.5 HYPERLIPIDEMIA ASSOCIATED WITH TYPE 2 DIABETES MELLITUS (HCC): ICD-10-CM

## 2024-10-09 DIAGNOSIS — I73.9 PAD (PERIPHERAL ARTERY DISEASE) (HCC): ICD-10-CM

## 2024-10-09 DIAGNOSIS — F33.1 MODERATE EPISODE OF RECURRENT MAJOR DEPRESSIVE DISORDER (HCC): ICD-10-CM

## 2024-10-09 DIAGNOSIS — Z95.1 HISTORY OF CORONARY ARTERY BYPASS GRAFT: Chronic | ICD-10-CM

## 2024-10-09 DIAGNOSIS — I15.2 HYPERTENSION ASSOCIATED WITH DIABETES (HCC): ICD-10-CM

## 2024-10-09 DIAGNOSIS — E11.42 DIABETIC POLYNEUROPATHY ASSOCIATED WITH TYPE 2 DIABETES MELLITUS (HCC): ICD-10-CM

## 2024-10-09 DIAGNOSIS — E11.69 HYPERLIPIDEMIA ASSOCIATED WITH TYPE 2 DIABETES MELLITUS (HCC): ICD-10-CM

## 2024-10-09 DIAGNOSIS — M79.2 PERIPHERAL NEUROPATHIC PAIN: ICD-10-CM

## 2024-10-09 DIAGNOSIS — E11.59 HYPERTENSION ASSOCIATED WITH DIABETES (HCC): ICD-10-CM

## 2024-10-09 DIAGNOSIS — Z12.11 COLON CANCER SCREENING: ICD-10-CM

## 2024-10-09 DIAGNOSIS — E11.65 TYPE 2 DIABETES MELLITUS WITH HYPERGLYCEMIA, WITHOUT LONG-TERM CURRENT USE OF INSULIN (HCC): ICD-10-CM

## 2024-10-09 DIAGNOSIS — Z00.00 ENCOUNTER FOR ANNUAL HEALTH EXAMINATION: Primary | ICD-10-CM

## 2024-10-09 DIAGNOSIS — Z89.422 HISTORY OF AMPUTATION OF LESSER TOE, LEFT (HCC): ICD-10-CM

## 2024-10-09 DIAGNOSIS — I25.10 CORONARY ARTERY DISEASE INVOLVING NATIVE CORONARY ARTERY OF NATIVE HEART WITHOUT ANGINA PECTORIS: ICD-10-CM

## 2024-10-09 DIAGNOSIS — Z12.5 PROSTATE CANCER SCREENING: ICD-10-CM

## 2024-10-09 PROBLEM — M86.171 ACUTE OSTEOMYELITIS OF RIGHT FOOT (HCC): Status: RESOLVED | Noted: 2024-02-09 | Resolved: 2024-10-09

## 2024-10-09 PROCEDURE — 3078F DIAST BP <80 MM HG: CPT | Performed by: NURSE PRACTITIONER

## 2024-10-09 PROCEDURE — 1170F FXNL STATUS ASSESSED: CPT | Performed by: NURSE PRACTITIONER

## 2024-10-09 PROCEDURE — 1160F RVW MEDS BY RX/DR IN RCRD: CPT | Performed by: NURSE PRACTITIONER

## 2024-10-09 PROCEDURE — 1159F MED LIST DOCD IN RCRD: CPT | Performed by: NURSE PRACTITIONER

## 2024-10-09 PROCEDURE — 3051F HG A1C>EQUAL 7.0%<8.0%: CPT | Performed by: NURSE PRACTITIONER

## 2024-10-09 PROCEDURE — 3074F SYST BP LT 130 MM HG: CPT | Performed by: NURSE PRACTITIONER

## 2024-10-09 PROCEDURE — 90662 IIV NO PRSV INCREASED AG IM: CPT | Performed by: NURSE PRACTITIONER

## 2024-10-09 PROCEDURE — 3008F BODY MASS INDEX DOCD: CPT | Performed by: NURSE PRACTITIONER

## 2024-10-09 PROCEDURE — G0439 PPPS, SUBSEQ VISIT: HCPCS | Performed by: NURSE PRACTITIONER

## 2024-10-09 PROCEDURE — 96160 PT-FOCUSED HLTH RISK ASSMT: CPT | Performed by: NURSE PRACTITIONER

## 2024-10-09 PROCEDURE — 99499 UNLISTED E&M SERVICE: CPT | Performed by: NURSE PRACTITIONER

## 2024-10-09 PROCEDURE — G0008 ADMIN INFLUENZA VIRUS VAC: HCPCS | Performed by: NURSE PRACTITIONER

## 2024-10-09 PROCEDURE — 1126F AMNT PAIN NOTED NONE PRSNT: CPT | Performed by: NURSE PRACTITIONER

## 2024-10-09 NOTE — PROGRESS NOTES
Subjective:   Roberto Oviedo is a 74 year old male who presents for a MA AHA (Medicare Advantage Annual Health Assessment) and Subsequent Annual Wellness visit (Pt already had Initial Annual Wellness) and scheduled follow up of multiple significant but stable problems.   Presents with daughter  Normal state of health  Checking feet daily, seeing Dr. Richardson in 2 weeks  Taking medication as prescribed.   Endocrine APN is no longer with practice  Reports having aorta screening in 2019 in [place] which was normal- never smoked 1 cigarette in his lifetime, no family hx of AAA  Has not had eye exam in over 1 year, goes to ophthalmologist in [place]  He has never had a colonoscopy    History/Other:   Fall Risk Assessment:   He has been screened for Falls and is High Risk. Fall Prevention information provided to patient in After Visit Summary.    Do you feel unsteady when standing or walking?: Yes  Do you worry about falling?: Yes  Have you fallen in the past year?: No     Cognitive Assessment:   Abnormal  What day of the week is this?: Correct  What month is it?: Correct  What year is it?: Correct  Recall \"Ball\": Correct  Recall \"Flag\": Incorrect  Recall \"Tree\": Correct    Functional Ability/Status:   Roberto Oviedo has some abnormal functions as listed below:  He has Driving difficulties based on screening of functional status. He has Meal Preparation difficulties based on screening of functional status.He has difficulties Managing Money/Bills based on screening of functional status.He has difficulties Shopping for Groceries based on screening of functional status. He has difficulties Taking Meds as Rx'd based on screening of functional status. He has difficulties Affording Meds based on screening of functional status. He has Vision problems based on screening of functional status.       Depression Screening (PHQ):  PHQ-2 SCORE: 0  , done 10/9/2024        <5 minutes spent screening and counseling for depression    Advanced  Directives:   He does NOT have a Living Will. [Do you have a living will?: No]  He does have a POA but we do NOT have it on file in Epic.    Discussed Advance Care Planning with patient (and family/surrogate if present). Standard forms made available to patient in After Visit Summary.  Paper copy of POA paperwork given to daughter today      Patient Active Problem List   Diagnosis    Type 2 diabetes mellitus with hyperglycemia, without long-term current use of insulin (HCC)    PAD (peripheral artery disease) ([facility])    Peripheral neuropathic pain    Coronary artery disease involving native heart without angina pectoris    Hyperlipidemia associated with type 2 diabetes mellitus (HCC)    Hypertension associated with diabetes (HCC)    Moderate episode of recurrent major depressive disorder ([facility])    History of amputation of lesser toe, left ([facility])    History of coronary artery bypass graft    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    Current Medications:  Outpatient Medications Marked as Taking for the 10/9/24 encounter (Office Visit) with Mirlande Capone APRN   Medication Sig    SERTRALINE 100 MG Oral Tab TAKE 1 TABLET BY MOUTH EVERY DAY    GLIMEPIRIDE 1 MG Oral Tab TAKE 1 TABLET (1 MG TOTAL) BY MOUTH DAILY WITH BREAKFAST. *DUE FOR LABS AND ANNUAL PHYSICAL, PLEASE CALL THE OFFICE*    METFORMIN 500 MG Oral Tab TAKE 2 TABLETS BY MOUTH TWICE A DAY WITH MEALS    ROSUVASTATIN 40 MG Oral Tab TAKE 1 TABLET BY MOUTH EVERY DAY    CLOPIDOGREL 75 MG Oral Tab TAKE 1 TABLET BY MOUTH EVERY DAY    GABAPENTIN 300 MG Oral Cap TAKE 1 CAPSULE BY MOUTH EVERY DAY AT NIGHT    Melatonin 5 MG Oral Tab Take 1 tablet (5 mg total) by mouth at bedtime.    LISINOPRIL 20 MG Oral Tab TAKE 1 TABLET BY MOUTH EVERY DAY    Exenatide ER (BYDUREON) 2 MG Subcutaneous Pen-injector Inject 2 mg into the skin once a week.     Continuous Blood Gluc Sensor (FREESTYLE CLAUDE 2 SENSOR) Does not apply Misc 1 each every 14 (fourteen) days. One sensor every 14 days.     Medical History:  He  has a past medical history of Diabetes (HCC), Essential hypertension, and Heart attack (HCC).  Surgical History:  He  has a past surgical history that includes Open heart surgical profile and Eye surgery (07/21/2022).   Family History:  His family history is not on file.  Social History:  He  reports that he has never smoked. He has never used smokeless tobacco. He reports that he does not currently use alcohol. He reports that he does not use drugs.    Tobacco:  He has never smoked tobacco.    CAGE Alcohol Screen:   CAGE screening score of 0 on 10/9/2024, showing low risk of alcohol abuse.      Patient Care Team:  Jhonathan Freitas MD as PCP - General (Internal Medicine)  Tonie García RN, NUNO as Registered Nurse (Registered Nurse)    Review of Systems  GENERAL: feels well otherwise  SKIN: denies any unusual skin lesions  EYES: denies blurred vision or double vision  HEENT: denies nasal congestion, sinus pain or ST  LUNGS: denies shortness of breath with exertion  CARDIOVASCULAR: denies chest pain on exertion  GI: denies abdominal pain, denies heartburn  : 1 per night nocturia, no complaint of urinary incontinence  MUSCULOSKELETAL: denies back pain  NEURO: denies headaches  PSYCHE: denies depression or anxiety  HEMATOLOGIC: denies hx of anemia  ENDOCRINE: denies thyroid history  ALL/ASTHMA: denies hx of allergy or asthma    Objective:   Physical Exam  General Appearance:  Alert, cooperative, no distress, appears stated age   Head:  Normocephalic, without obvious abnormality, atraumatic   Eyes:  PERRL, conjunctiva/corneas clear, EOM's intact, both eyes   Ears:  Normal TM's and external ear canals, both ears   Nose: Nares normal, septum midline, mucosa normal, no drainage or sinus tenderness   Throat: Lips, mucosa, and tongue normal; teeth and gums  normal   Neck: Supple, symmetrical, trachea midline, no adenopathy, thyroid: not enlarged, symmetric, no tenderness/mass/nodules, no carotid bruit or JVD   Back:   Symmetric, no curvature, ROM normal, no CVA tenderness   Lungs:   Clear to auscultation bilaterally, respirations unlabored   Chest Wall:  No tenderness or deformity   Heart:  Regular rate and rhythm, S1, S2 normal, no murmur, rub or gallop   Abdomen:   Soft, non-tender, bowel sounds active all four quadrants,  no masses, no organomegaly   Extremities: Extremities normal, atraumatic, no cyanosis or edema   Pulses: 1+ and symmetric   Skin: Skin color, texture, turgor normal, no rashes or lesions   Lymph nodes: Cervical, supraclavicular, and axillary nodes normal   Neurologic: Normal  Bilateral barefoot skin diabetic exam is normal, visualized feet and the appearance is normal. Blanchable right 4th digit distal toe. Amputation of bilateral 5th digit.   Bilateral monofilament/sensation of both feet is normal.  Pulsation pedal pulse exam of both lower legs/feet 1+         /78   Pulse 71   Temp 97 °F (36.1 °C) (Temporal)   Resp 16   Ht 5' 10\" (1.778 m)   Wt 171 lb (77.6 kg)   SpO2 99%   BMI 24.54 kg/m²  Estimated body mass index is 24.54 kg/m² as calculated from the following:    Height as of this encounter: 5' 10\" (1.778 m).    Weight as of this encounter: 171 lb (77.6 kg).    Medicare Hearing Assessment:   Hearing Screening    Time taken: 10/9/2024  9:50 AM  Screening Method: Finger Rub  Finger Rub Result: Pass               Assessment & Plan:   Roberto Oviedo is a 74 year old male who presents for a Medicare Assessment.     1. Encounter for annual health examination (Primary)  2. Type 2 diabetes mellitus with hyperglycemia, without long-term current use of insulin (HCC)- check labs today, if A1C >9%, will refer back to endo PJN- reminded to complete eye exam  -     Hemoglobin A1C; Future; Expected date: 10/09/2024  -     Microalb/Creat Ratio,  Random Urine; Future; Expected date: 10/09/2024  3. PAD (peripheral artery disease) (HCC)- stable, seeing vascular, monitor for wounds  4. Peripheral neuropathic pain- stable, importance of sugar control  5. Coronary artery disease involving native coronary artery of native heart without angina pectoris- asymptomatic, seeing Dr. Benavides  6. Hyperlipidemia associated with type 2 diabetes mellitus (HCC)- check levels today on statin therapy  -     Lipid Panel; Future; Expected date: 10/09/2024  7. Hypertension associated with diabetes (HCC)- controlled, continue present management  -     CBC With Differential With Platelet; Future; Expected date: 10/09/2024  -     Comp Metabolic Panel (14); Future; Expected date: 10/09/2024  -     Urinalysis, Routine; Future; Expected date: 10/09/2024  8. Moderate episode of recurrent major depressive disorder (HCC)- reports mood is stable  9. History of amputation of lesser toe, left (HCC)- discussed importance of monitoring skin, following with podiatry as directed  10. Diabetic polyneuropathy associated with type 2 diabetes mellitus (HCC)- good blood sugar control for prevention of further manage, use gabapentin as prescribed   11. History of coronary artery bypass graft- asymptomatic, control risk factors, follows with cardiology  12. Prostate cancer screening  -     PSA Total, Screen; Future; Expected date: 10/09/2024  13. Colon cancer screening  -     Gastro Referral - In Network  14. Need for influenza vaccination  -     High Dose Fluzone trivalent influenza, 65 yrs+ PFS [97085]    The patient indicates understanding of these issues and agrees to the plan.  Lab work ordered.  Reinforced healthy diet, lifestyle, and exercise.      Return in about 6 months (around 4/9/2025) for Follow-up.     Mirlande Capone, APRN, 10/9/2024     Supplementary Documentation:   General Health:  In the past six months, have you lost more than 10 pounds without trying?: 2 - No  Has your appetite  been poor?: No  Type of Diet: Diabetic  How does the patient maintain a good energy level?: Daily Walks;Stretching  How would you describe your daily physical activity?: Light  How would you describe your current health state?: Fair  How do you maintain positive mental well-being?: Social Interaction;Puzzles;Visiting Friends;Visiting Family  On a scale of 0 to 10, with 0 being no pain and 10 being severe pain, what is your pain level?: 0 - (None)  In the past six months, have you experienced urine leakage?: 0-No  At any time do you feel concerned for the safety/well-being of yourself and/or your children, in your home or elsewhere?: No  Have you had any immunizations at another office such as Influenza, Hepatitis B, Tetanus, or Pneumococcal?: No    Health Maintenance   Topic Date Due    Colorectal Cancer Screening  Never done    Zoster Vaccines (1 of 2) Never done    Diabetes Care Dilated Eye Exam  03/29/2023    MA Annual Health Assessment  01/01/2024    Diabetes Care A1C  08/09/2024    Diabetes Care Foot Exam  08/30/2024    COVID-19 Vaccine (3 - 2023-24 season) 09/01/2024    Diabetes Care: Microalb/Creat Ratio  09/30/2024    Influenza Vaccine (1) 10/01/2024    Diabetes Care: GFR  02/12/2025    PSA  09/30/2025    Annual Depression Screening  Completed    Fall Risk Screening (Annual)  Completed    Pneumococcal Vaccine: 65+ Years  Completed

## 2024-10-26 ENCOUNTER — LAB ENCOUNTER (OUTPATIENT)
Dept: LAB | Age: 74
End: 2024-10-26
Attending: NURSE PRACTITIONER
Payer: MEDICARE

## 2024-10-26 DIAGNOSIS — E11.59 HYPERTENSION ASSOCIATED WITH DIABETES (HCC): ICD-10-CM

## 2024-10-26 DIAGNOSIS — Z12.5 PROSTATE CANCER SCREENING: ICD-10-CM

## 2024-10-26 DIAGNOSIS — E11.65 TYPE 2 DIABETES MELLITUS WITH HYPERGLYCEMIA, WITHOUT LONG-TERM CURRENT USE OF INSULIN (HCC): ICD-10-CM

## 2024-10-26 DIAGNOSIS — E78.5 HYPERLIPIDEMIA ASSOCIATED WITH TYPE 2 DIABETES MELLITUS (HCC): ICD-10-CM

## 2024-10-26 DIAGNOSIS — E11.69 HYPERLIPIDEMIA ASSOCIATED WITH TYPE 2 DIABETES MELLITUS (HCC): ICD-10-CM

## 2024-10-26 DIAGNOSIS — I15.2 HYPERTENSION ASSOCIATED WITH DIABETES (HCC): ICD-10-CM

## 2024-10-26 LAB
ALBUMIN SERPL-MCNC: 4.6 G/DL (ref 3.2–4.8)
ALBUMIN/GLOB SERPL: 1.6 {RATIO} (ref 1–2)
ALP LIVER SERPL-CCNC: 103 U/L
ALT SERPL-CCNC: 11 U/L
ANION GAP SERPL CALC-SCNC: 7 MMOL/L (ref 0–18)
AST SERPL-CCNC: 20 U/L (ref ?–34)
BASOPHILS # BLD AUTO: 0.06 X10(3) UL (ref 0–0.2)
BASOPHILS NFR BLD AUTO: 0.9 %
BILIRUB SERPL-MCNC: 0.5 MG/DL (ref 0.2–1.1)
BUN BLD-MCNC: 15 MG/DL (ref 9–23)
CALCIUM BLD-MCNC: 10.2 MG/DL (ref 8.7–10.4)
CHLORIDE SERPL-SCNC: 105 MMOL/L (ref 98–112)
CHOLEST SERPL-MCNC: 328 MG/DL (ref ?–200)
CO2 SERPL-SCNC: 26 MMOL/L (ref 21–32)
COMPLEXED PSA SERPL-MCNC: 0.57 NG/ML (ref ?–4)
CREAT BLD-MCNC: 1.02 MG/DL
EGFRCR SERPLBLD CKD-EPI 2021: 78 ML/MIN/1.73M2 (ref 60–?)
EOSINOPHIL # BLD AUTO: 0.4 X10(3) UL (ref 0–0.7)
EOSINOPHIL NFR BLD AUTO: 6 %
ERYTHROCYTE [DISTWIDTH] IN BLOOD BY AUTOMATED COUNT: 13.1 %
EST. AVERAGE GLUCOSE BLD GHB EST-MCNC: 197 MG/DL (ref 68–126)
FASTING PATIENT LIPID ANSWER: YES
FASTING STATUS PATIENT QL REPORTED: YES
GLOBULIN PLAS-MCNC: 2.9 G/DL (ref 2–3.5)
GLUCOSE BLD-MCNC: 148 MG/DL (ref 70–99)
HBA1C MFR BLD: 8.5 % (ref ?–5.7)
HCT VFR BLD AUTO: 37.6 %
HDLC SERPL-MCNC: 54 MG/DL (ref 40–59)
HGB BLD-MCNC: 11.9 G/DL
IMM GRANULOCYTES # BLD AUTO: 0.02 X10(3) UL (ref 0–1)
IMM GRANULOCYTES NFR BLD: 0.3 %
LDLC SERPL CALC-MCNC: 259 MG/DL (ref ?–100)
LYMPHOCYTES # BLD AUTO: 1.07 X10(3) UL (ref 1–4)
LYMPHOCYTES NFR BLD AUTO: 16.1 %
MCH RBC QN AUTO: 30.1 PG (ref 26–34)
MCHC RBC AUTO-ENTMCNC: 31.6 G/DL (ref 31–37)
MCV RBC AUTO: 94.9 FL
MONOCYTES # BLD AUTO: 0.71 X10(3) UL (ref 0.1–1)
MONOCYTES NFR BLD AUTO: 10.7 %
NEUTROPHILS # BLD AUTO: 4.4 X10 (3) UL (ref 1.5–7.7)
NEUTROPHILS # BLD AUTO: 4.4 X10(3) UL (ref 1.5–7.7)
NEUTROPHILS NFR BLD AUTO: 66 %
NONHDLC SERPL-MCNC: 274 MG/DL (ref ?–130)
OSMOLALITY SERPL CALC.SUM OF ELEC: 290 MOSM/KG (ref 275–295)
PLATELET # BLD AUTO: 261 10(3)UL (ref 150–450)
POTASSIUM SERPL-SCNC: 4.6 MMOL/L (ref 3.5–5.1)
PROT SERPL-MCNC: 7.5 G/DL (ref 5.7–8.2)
RBC # BLD AUTO: 3.96 X10(6)UL
SODIUM SERPL-SCNC: 138 MMOL/L (ref 136–145)
TRIGL SERPL-MCNC: 92 MG/DL (ref 30–149)
VLDLC SERPL CALC-MCNC: 22 MG/DL (ref 0–30)
WBC # BLD AUTO: 6.7 X10(3) UL (ref 4–11)

## 2024-10-26 PROCEDURE — 80053 COMPREHEN METABOLIC PANEL: CPT

## 2024-10-26 PROCEDURE — 36415 COLL VENOUS BLD VENIPUNCTURE: CPT

## 2024-10-26 PROCEDURE — 83036 HEMOGLOBIN GLYCOSYLATED A1C: CPT

## 2024-10-26 PROCEDURE — 80061 LIPID PANEL: CPT

## 2024-10-26 PROCEDURE — 85025 COMPLETE CBC W/AUTO DIFF WBC: CPT

## 2024-11-23 DIAGNOSIS — F32.0 MILD MAJOR DEPRESSION (HCC): ICD-10-CM

## 2024-11-23 DIAGNOSIS — E11.9 TYPE 2 DIABETES MELLITUS WITHOUT COMPLICATION, WITHOUT LONG-TERM CURRENT USE OF INSULIN (HCC): ICD-10-CM

## 2024-11-23 DIAGNOSIS — I25.10 CORONARY ARTERY DISEASE INVOLVING NATIVE HEART WITHOUT ANGINA PECTORIS, UNSPECIFIED VESSEL OR LESION TYPE: ICD-10-CM

## 2024-11-24 RX ORDER — SERTRALINE HYDROCHLORIDE 100 MG/1
100 TABLET, FILM COATED ORAL DAILY
Qty: 90 TABLET | Refills: 0 | Status: SHIPPED | OUTPATIENT
Start: 2024-11-24

## 2024-11-24 RX ORDER — CLOPIDOGREL BISULFATE 75 MG/1
75 TABLET ORAL DAILY
Qty: 90 TABLET | Refills: 0 | Status: SHIPPED | OUTPATIENT
Start: 2024-11-24

## 2024-11-24 NOTE — TELEPHONE ENCOUNTER
Last time medication was refilled: 3/26/24  Next office visit due/scheduled: 4/9/25  Last office visit: 10/9/24  Last Labs: 10/26/24

## 2024-11-24 NOTE — TELEPHONE ENCOUNTER
Last time medication was refilled: 7/28/24  Next office visit due/scheduled: 4/9/25  Last office visit: 10/9/24  Last Labs: 10/26/24

## 2025-03-23 DIAGNOSIS — F32.0 MILD MAJOR DEPRESSION: ICD-10-CM

## 2025-03-23 RX ORDER — SERTRALINE HYDROCHLORIDE 100 MG/1
100 TABLET, FILM COATED ORAL DAILY
Qty: 90 TABLET | Refills: 0 | Status: SHIPPED | OUTPATIENT
Start: 2025-03-23

## 2025-03-24 NOTE — TELEPHONE ENCOUNTER
Last time medication was refilled: 11/24/24  Next office visit due/scheduled: 4/9/25  Last office visit: 10/9/24  Last Labs: 10/26/24

## 2025-06-20 ENCOUNTER — TELEPHONE (OUTPATIENT)
Dept: INTERNAL MEDICINE CLINIC | Facility: CLINIC | Age: 75
End: 2025-06-20

## 2025-06-25 ENCOUNTER — LAB ENCOUNTER (OUTPATIENT)
Dept: LAB | Age: 75
End: 2025-06-25
Attending: NURSE PRACTITIONER
Payer: MEDICARE

## 2025-06-25 ENCOUNTER — OFFICE VISIT (OUTPATIENT)
Dept: INTERNAL MEDICINE CLINIC | Facility: CLINIC | Age: 75
End: 2025-06-25
Payer: COMMERCIAL

## 2025-06-25 VITALS
SYSTOLIC BLOOD PRESSURE: 118 MMHG | OXYGEN SATURATION: 98 % | DIASTOLIC BLOOD PRESSURE: 68 MMHG | BODY MASS INDEX: 24.05 KG/M2 | HEART RATE: 78 BPM | RESPIRATION RATE: 18 BRPM | HEIGHT: 70 IN | WEIGHT: 168 LBS

## 2025-06-25 DIAGNOSIS — I15.2 HYPERTENSION ASSOCIATED WITH DIABETES (HCC): ICD-10-CM

## 2025-06-25 DIAGNOSIS — E11.59 HYPERTENSION ASSOCIATED WITH DIABETES (HCC): ICD-10-CM

## 2025-06-25 DIAGNOSIS — Z12.11 COLON CANCER SCREENING: ICD-10-CM

## 2025-06-25 DIAGNOSIS — E78.5 HYPERLIPIDEMIA ASSOCIATED WITH TYPE 2 DIABETES MELLITUS (HCC): ICD-10-CM

## 2025-06-25 DIAGNOSIS — H35.3130 BILATERAL NONEXUDATIVE AGE-RELATED MACULAR DEGENERATION, UNSPECIFIED STAGE: ICD-10-CM

## 2025-06-25 DIAGNOSIS — E11.65 TYPE 2 DIABETES MELLITUS WITH HYPERGLYCEMIA, WITHOUT LONG-TERM CURRENT USE OF INSULIN (HCC): ICD-10-CM

## 2025-06-25 DIAGNOSIS — E11.69 HYPERLIPIDEMIA ASSOCIATED WITH TYPE 2 DIABETES MELLITUS (HCC): ICD-10-CM

## 2025-06-25 DIAGNOSIS — Z89.421 STATUS POST AMPUTATION OF LESSER TOE OF RIGHT FOOT (HCC): ICD-10-CM

## 2025-06-25 DIAGNOSIS — E11.42 DIABETIC POLYNEUROPATHY ASSOCIATED WITH TYPE 2 DIABETES MELLITUS (HCC): ICD-10-CM

## 2025-06-25 DIAGNOSIS — E11.65 TYPE 2 DIABETES MELLITUS WITH HYPERGLYCEMIA, WITHOUT LONG-TERM CURRENT USE OF INSULIN (HCC): Primary | ICD-10-CM

## 2025-06-25 DIAGNOSIS — I73.9 PAD (PERIPHERAL ARTERY DISEASE): ICD-10-CM

## 2025-06-25 LAB
ALBUMIN SERPL-MCNC: 4.7 G/DL (ref 3.2–4.8)
ALBUMIN/GLOB SERPL: 1.7 {RATIO} (ref 1–2)
ALP LIVER SERPL-CCNC: 118 U/L (ref 45–117)
ALT SERPL-CCNC: 10 U/L (ref 10–49)
ANION GAP SERPL CALC-SCNC: 10 MMOL/L (ref 0–18)
AST SERPL-CCNC: 16 U/L (ref ?–34)
BASOPHILS # BLD AUTO: 0.06 X10(3) UL (ref 0–0.2)
BASOPHILS NFR BLD AUTO: 0.6 %
BILIRUB SERPL-MCNC: 0.4 MG/DL (ref 0.2–1.1)
BILIRUB UR QL STRIP.AUTO: NEGATIVE
BUN BLD-MCNC: 19 MG/DL (ref 9–23)
CALCIUM BLD-MCNC: 10 MG/DL (ref 8.7–10.6)
CHLORIDE SERPL-SCNC: 97 MMOL/L (ref 98–112)
CHOLEST SERPL-MCNC: 345 MG/DL (ref ?–200)
CLARITY UR REFRACT.AUTO: CLEAR
CO2 SERPL-SCNC: 26 MMOL/L (ref 21–32)
COLOR UR AUTO: YELLOW
CREAT BLD-MCNC: 1.24 MG/DL (ref 0.7–1.3)
CREAT UR-SCNC: 154.1 MG/DL
EGFRCR SERPLBLD CKD-EPI 2021: 61 ML/MIN/1.73M2 (ref 60–?)
EOSINOPHIL # BLD AUTO: 1.76 X10(3) UL (ref 0–0.7)
EOSINOPHIL NFR BLD AUTO: 17.8 %
ERYTHROCYTE [DISTWIDTH] IN BLOOD BY AUTOMATED COUNT: 13 %
EST. AVERAGE GLUCOSE BLD GHB EST-MCNC: 401 MG/DL (ref 68–126)
FASTING PATIENT LIPID ANSWER: YES
FASTING STATUS PATIENT QL REPORTED: YES
GLOBULIN PLAS-MCNC: 2.8 G/DL (ref 2–3.5)
GLUCOSE BLD-MCNC: 316 MG/DL (ref 70–99)
GLUCOSE UR STRIP.AUTO-MCNC: 1000 MG/DL
HBA1C MFR BLD: 15.6 % (ref ?–5.7)
HCT VFR BLD AUTO: 42.7 % (ref 39–53)
HDLC SERPL-MCNC: 53 MG/DL (ref 40–59)
HGB BLD-MCNC: 13.9 G/DL (ref 13–17.5)
IMM GRANULOCYTES # BLD AUTO: 0.04 X10(3) UL (ref 0–1)
IMM GRANULOCYTES NFR BLD: 0.4 %
KETONES UR STRIP.AUTO-MCNC: NEGATIVE MG/DL
LDLC SERPL CALC-MCNC: 264 MG/DL (ref ?–100)
LEUKOCYTE ESTERASE UR QL STRIP.AUTO: 250
LYMPHOCYTES # BLD AUTO: 1.25 X10(3) UL (ref 1–4)
LYMPHOCYTES NFR BLD AUTO: 12.6 %
MCH RBC QN AUTO: 29.9 PG (ref 26–34)
MCHC RBC AUTO-ENTMCNC: 32.6 G/DL (ref 31–37)
MCV RBC AUTO: 91.8 FL (ref 80–100)
MICROALBUMIN UR-MCNC: 73.5 MG/DL
MICROALBUMIN/CREAT 24H UR-RTO: 477 UG/MG (ref ?–30)
MONOCYTES # BLD AUTO: 0.73 X10(3) UL (ref 0.1–1)
MONOCYTES NFR BLD AUTO: 7.4 %
NEUTROPHILS # BLD AUTO: 6.07 X10 (3) UL (ref 1.5–7.7)
NEUTROPHILS # BLD AUTO: 6.07 X10(3) UL (ref 1.5–7.7)
NEUTROPHILS NFR BLD AUTO: 61.2 %
NITRITE UR QL STRIP.AUTO: NEGATIVE
NONHDLC SERPL-MCNC: 292 MG/DL (ref ?–130)
OSMOLALITY SERPL CALC.SUM OF ELEC: 290 MOSM/KG (ref 275–295)
PH UR STRIP.AUTO: 5.5 [PH] (ref 5–8)
PLATELET # BLD AUTO: 321 10(3)UL (ref 150–450)
POTASSIUM SERPL-SCNC: 4.8 MMOL/L (ref 3.5–5.1)
PROT SERPL-MCNC: 7.5 G/DL (ref 5.7–8.2)
PROT UR STRIP.AUTO-MCNC: 70 MG/DL
RBC # BLD AUTO: 4.65 X10(6)UL (ref 3.8–5.8)
SODIUM SERPL-SCNC: 133 MMOL/L (ref 136–145)
SP GR UR STRIP.AUTO: 1.02 (ref 1–1.03)
TRIGL SERPL-MCNC: 145 MG/DL (ref 30–149)
UROBILINOGEN UR STRIP.AUTO-MCNC: NORMAL MG/DL
VLDLC SERPL CALC-MCNC: 35 MG/DL (ref 0–30)
WBC # BLD AUTO: 9.9 X10(3) UL (ref 4–11)
WBC #/AREA URNS AUTO: >50 /HPF

## 2025-06-25 PROCEDURE — 1159F MED LIST DOCD IN RCRD: CPT | Performed by: NURSE PRACTITIONER

## 2025-06-25 PROCEDURE — 99214 OFFICE O/P EST MOD 30 MIN: CPT | Performed by: NURSE PRACTITIONER

## 2025-06-25 PROCEDURE — 3074F SYST BP LT 130 MM HG: CPT | Performed by: NURSE PRACTITIONER

## 2025-06-25 PROCEDURE — 36415 COLL VENOUS BLD VENIPUNCTURE: CPT

## 2025-06-25 PROCEDURE — 80061 LIPID PANEL: CPT

## 2025-06-25 PROCEDURE — 3078F DIAST BP <80 MM HG: CPT | Performed by: NURSE PRACTITIONER

## 2025-06-25 PROCEDURE — 3008F BODY MASS INDEX DOCD: CPT | Performed by: NURSE PRACTITIONER

## 2025-06-25 PROCEDURE — 1170F FXNL STATUS ASSESSED: CPT | Performed by: NURSE PRACTITIONER

## 2025-06-25 PROCEDURE — 83036 HEMOGLOBIN GLYCOSYLATED A1C: CPT

## 2025-06-25 PROCEDURE — 81001 URINALYSIS AUTO W/SCOPE: CPT

## 2025-06-25 PROCEDURE — G2211 COMPLEX E/M VISIT ADD ON: HCPCS | Performed by: NURSE PRACTITIONER

## 2025-06-25 PROCEDURE — 82043 UR ALBUMIN QUANTITATIVE: CPT

## 2025-06-25 PROCEDURE — 82570 ASSAY OF URINE CREATININE: CPT

## 2025-06-25 PROCEDURE — 85025 COMPLETE CBC W/AUTO DIFF WBC: CPT

## 2025-06-25 PROCEDURE — 80053 COMPREHEN METABOLIC PANEL: CPT

## 2025-06-25 PROCEDURE — 1160F RVW MEDS BY RX/DR IN RCRD: CPT | Performed by: NURSE PRACTITIONER

## 2025-06-25 NOTE — PROGRESS NOTES
The following individual(s) verbally consented to be recorded using ambient AI listening technology and understand that they can each withdraw their consent to this listening technology at any point by asking the clinician to turn off or pause the recording:    Patient name: Roberto JUAN CARLOS Oviedo  Additional names:  Ricarda (Daughter)

## 2025-06-25 NOTE — PROGRESS NOTES
HPI:   Roberto Oviedo is a 74 year old male who presents for recheck of his diabetes.   Patient’s FBS have been unsure d/t not being able to get rusty meter.   Last visit with ophthalmologist was June 2025.    Pt has been checking his feet on a regular basis. Pt denies any tingling of the feet.   Pt denies any issues with depression.   History of Present Illness  Roberto Oviedo is a 74 year old male with diabetes who presents for a routine follow-up and blood work evaluation. He is accompanied by his daughter. He was referred by his eye doctor for a retinal specialist consultation due to macular degeneration.    He experiences constipation, using a stool softener at one to two doses, which has improved his condition but not to his satisfaction. Bowel movements occur less frequently and with more difficulty, without blood or mucus. He drinks a lot of water but has a low intake of fruits and vegetables.    He has macular degeneration, identified by his eye doctor, with a referral to a retinal specialist. His mother also had macular degeneration. He can read signs and has had cataract surgery on his left eye, with potential future surgery on the right eye.     He monitors symptoms of high blood sugar, such as irritability and changes in appetite, but has not noticed these signs recently.    No symptoms of neuropathy, such as numbness or falls. He checks his feet every other day and applies lotion regularly. A podiatrist appointment is scheduled for next month. He had two toes removed in the past due to complications.    He engages in physical activity by walking the hallways of his apartment and occasionally walking his dog, avoiding outdoor walks due to the heat.      Wt Readings from Last 6 Encounters:   06/25/25 168 lb (76.2 kg)   10/09/24 171 lb (77.6 kg)   02/09/24 155 lb (70.3 kg)   08/30/23 152 lb (68.9 kg)   09/23/22 144 lb (65.3 kg)   06/20/22 160 lb (72.6 kg)     Body mass index is 24.11 kg/m².     No  components found for: \"HGBA1C\"  HEMOGLOBIN A1C (%)   Date Value   09/23/2022 6.7 (A)   04/26/2022 6.2 (A)   11/02/2021 9.3 (A)     HgbA1C (%)   Date Value   10/26/2024 8.5 (H)   02/09/2024 7.6 (H)   09/30/2023 7.2 (H)     Cholesterol, Total (mg/dL)   Date Value   10/26/2024 328 (H)   09/30/2023 161     HDL Cholesterol (mg/dL)   Date Value   10/26/2024 54   09/30/2023 59     LDL Cholesterol (mg/dL)   Date Value   10/26/2024 259 (H)   09/30/2023 94     AST (U/L)   Date Value   10/26/2024 20   02/10/2024 6 (L)   02/09/2024 10 (L)     ALT (U/L)   Date Value   10/26/2024 11   02/10/2024 12 (L)   02/09/2024 13 (L)      Malb/Cre Calc   Date Value Ref Range Status   09/30/2023 199.1 (H) <=30.0 ug/mg Final     Comment:     <30 ug/mg creatinine       Normal     ug/mg creatinine   Microalbuminuria   >300 ug/mg creatinine      Albuminuria       11/02/2021 131.6 (H) <=30.0 ug/mg Final     Comment:     <30 ug/mg creatinine       Normal     ug/mg creatinine   Microalbuminuria   >300 ug/mg creatinine      Albuminuria           Current Medications[1]   Past Medical History[2]   Past Surgical History[3]   Social History: Short Social Hx on File[4]       REVIEW OF SYSTEMS:   GENERAL HEALTH: feels well otherwise  SKIN: denies any unusual skin lesions or rashes  RESPIRATORY: denies shortness of breath with exertion  CARDIOVASCULAR: denies chest pain on exertion  GI: denies abdominal pain and denies heartburn  NEURO: denies headaches    EXAM:   /68   Pulse 78   Resp 18   Ht 5' 10\" (1.778 m)   Wt 168 lb (76.2 kg)   SpO2 98%   BMI 24.11 kg/m²   GENERAL: well developed, well nourished,in no apparent distress  SKIN: no rashes,no suspicious lesions  NECK: supple,no adenopathy,no bruits  LUNGS: clear to auscultation  CARDIO: RRR without murmur  GI: good BS's,no masses, HSM or tenderness  EXTREMITIES: no cyanosis, clubbing or edema  ASSESSMENT AND PLAN:   Roberto Oviedo is a 74 year old male who presents for a recheck  of his diabetes.   Encounter Diagnoses   Name Primary?    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  Recommendations are: continue present meds until labs are completed and notified of results.   Low carbohydrate controlled diet and exercise daily   Refer to retinal specialist, check feet daily.  Check FIT for colon cancer screening    The patient indicates understanding of these issues and agrees to the plan.  Return in about 4 months (around 10/25/2025) for Annual physical.          [1]   Current Outpatient Medications   Medication Sig Dispense Refill    SERTRALINE 100 MG Oral Tab TAKE 1 TABLET BY MOUTH EVERY DAY 90 tablet 0    CLOPIDOGREL 75 MG Oral Tab TAKE 1 TABLET BY MOUTH EVERY DAY 90 tablet 0    METFORMIN 500 MG Oral Tab TAKE 2 TABLETS BY MOUTH TWICE A DAY WITH MEALS 360 tablet 0    GLIMEPIRIDE 1 MG Oral Tab TAKE 1 TABLET (1 MG TOTAL) BY MOUTH DAILY WITH BREAKFAST. *DUE FOR LABS AND ANNUAL PHYSICAL, PLEASE CALL THE OFFICE* 90 tablet 2    ROSUVASTATIN 40 MG Oral Tab TAKE 1 TABLET BY MOUTH EVERY DAY 90 tablet 3    Melatonin 5 MG Oral Tab Take 1 tablet (5 mg total) by mouth at bedtime.      LISINOPRIL 20 MG Oral Tab TAKE 1 TABLET BY MOUTH EVERY DAY 90 tablet 1    Continuous Blood Gluc Sensor (FREESTYLE CLAUDE 2 SENSOR) Does not apply Misc 1 each every 14 (fourteen) days. One sensor every 14 days. 2 each 3    GABAPENTIN 300 MG Oral Cap TAKE 1 CAPSULE BY MOUTH EVERY DAY AT NIGHT (Patient not taking: Reported on 6/25/2025) 90 capsule 0    Exenatide ER (BYDUREON) 2 MG Subcutaneous Pen-injector Inject 2 mg  into the skin once a week. (Patient not taking: Reported on 6/25/2025)     [2]   Past Medical History:   Diabetes (HCC)    Essential hypertension    Heart attack (HCC)   [3]   Past Surgical History:  Procedure Laterality Date    Eye surgery  07/21/2022    Open heart surgical profile     [4]   Social History  Socioeconomic History    Marital status:    Tobacco Use    Smoking status: Never    Smokeless tobacco: Never   Vaping Use    Vaping status: Never Used   Substance and Sexual Activity    Alcohol use: Not Currently    Drug use: Never     Social Drivers of Health     Food Insecurity: No Food Insecurity (2/9/2024)    Food Insecurity     Food Insecurity: Never true   Transportation Needs: No Transportation Needs (2/9/2024)    Transportation Needs     Lack of Transportation: No   Housing Stability: Low Risk  (2/9/2024)    Housing Stability     Housing Instability: No

## 2025-06-26 DIAGNOSIS — E11.9 TYPE 2 DIABETES MELLITUS WITHOUT COMPLICATION, WITHOUT LONG-TERM CURRENT USE OF INSULIN (HCC): ICD-10-CM

## 2025-06-26 NOTE — TELEPHONE ENCOUNTER
The original prescription was discontinued on 6/26/2025 by Luz White, ALL[facility] for the following reason: Patient discontinued. Renewing this prescription may not be appropriate.    Okay to deny per Luz GOLD.

## 2025-06-27 ENCOUNTER — TELEPHONE (OUTPATIENT)
Dept: INTERNAL MEDICINE CLINIC | Facility: CLINIC | Age: 75
End: 2025-06-27

## 2025-06-27 NOTE — TELEPHONE ENCOUNTER
Medical record request received from [facility]. A copy was sent vi interoffice mail to the medical records department and a copy of request was also sent to he scanning department via interoffice mail  along with a barcode sheet

## 2025-07-02 RX ORDER — ROSUVASTATIN CALCIUM 40 MG/1
40 TABLET, COATED ORAL DAILY
Qty: 90 TABLET | Refills: 3 | OUTPATIENT
Start: 2025-07-02

## 2025-07-11 ENCOUNTER — OFFICE VISIT (OUTPATIENT)
Dept: PODIATRY CLINIC | Facility: CLINIC | Age: 75
End: 2025-07-11
Payer: COMMERCIAL

## 2025-07-11 DIAGNOSIS — M20.42 HAMMER TOES OF BOTH FEET: ICD-10-CM

## 2025-07-11 DIAGNOSIS — E11.42 DIABETIC POLYNEUROPATHY ASSOCIATED WITH TYPE 2 DIABETES MELLITUS (HCC): Primary | ICD-10-CM

## 2025-07-11 DIAGNOSIS — I73.9 PAD (PERIPHERAL ARTERY DISEASE): ICD-10-CM

## 2025-07-11 DIAGNOSIS — M77.42 METATARSALGIA, LEFT FOOT: ICD-10-CM

## 2025-07-11 DIAGNOSIS — M20.41 HAMMER TOES OF BOTH FEET: ICD-10-CM

## 2025-07-11 DIAGNOSIS — Z89.422 HISTORY OF AMPUTATION OF LESSER TOE, LEFT (HCC): ICD-10-CM

## 2025-07-11 DIAGNOSIS — L84 FOOT CALLUS: ICD-10-CM

## 2025-07-11 PROCEDURE — 1159F MED LIST DOCD IN RCRD: CPT | Performed by: STUDENT IN AN ORGANIZED HEALTH CARE EDUCATION/TRAINING PROGRAM

## 2025-07-11 PROCEDURE — 1160F RVW MEDS BY RX/DR IN RCRD: CPT | Performed by: STUDENT IN AN ORGANIZED HEALTH CARE EDUCATION/TRAINING PROGRAM

## 2025-07-11 PROCEDURE — 99213 OFFICE O/P EST LOW 20 MIN: CPT | Performed by: STUDENT IN AN ORGANIZED HEALTH CARE EDUCATION/TRAINING PROGRAM

## 2025-07-11 NOTE — PROGRESS NOTES
[facility]  Progress Note    Roberto Oviedo is a 74 year old male.   Chief Complaint   Patient presents with    Toenail Care     Nail care and foot check         HPI:     Patient is a pleasant 74-year-old male with history of diabetes and PAD who presents to clinic for diabetic foot check.  He has elongated and thickened nails he has difficulty trimming on his own.  He is well-known to me as he has history of partial fifth ray amputation for treatment of osteomyelitis (DOS: 2/13/24).  Surgical site is well-healed.  He does have elongated and thickened nails he has difficulty trimming on his own.  His last hemoglobin A1c was 15.6% on 6/25/2025.  He is without pain at this time.  No other complaints are mentioned.        Allergies: Patient has no known allergies.   Current Outpatient Medications   Medication Sig Dispense Refill    rosuvastatin (CRESTOR) 40 MG Oral Tab Take 1 tablet (40 mg total) by mouth nightly. 90 tablet 0    SERTRALINE 100 MG Oral Tab TAKE 1 TABLET BY MOUTH EVERY DAY 90 tablet 0    CLOPIDOGREL 75 MG Oral Tab TAKE 1 TABLET BY MOUTH EVERY DAY 90 tablet 0    METFORMIN 500 MG Oral Tab TAKE 2 TABLETS BY MOUTH TWICE A DAY WITH MEALS 360 tablet 0    GLIMEPIRIDE 1 MG Oral Tab TAKE 1 TABLET (1 MG TOTAL) BY MOUTH DAILY WITH BREAKFAST. *DUE FOR LABS AND ANNUAL PHYSICAL, PLEASE CALL THE OFFICE* 90 tablet 2    Melatonin 5 MG Oral Tab Take 1 tablet (5 mg total) by mouth at bedtime.      LISINOPRIL 20 MG Oral Tab TAKE 1 TABLET BY MOUTH EVERY DAY 90 tablet 1    Exenatide ER (BYDUREON) 2 MG Subcutaneous Pen-injector Inject 2 mg into the skin once a week.      Continuous Blood Gluc Sensor (FREESTYLE CLAUDE 2 SENSOR) Does not apply Misc 1 each every 14 (fourteen) days. One sensor every 14 days. 2 each 3      Past Medical History:    Diabetes (HCC)    Essential hypertension    Heart attack (HCC)      Past Surgical History:   Procedure Laterality Date    Eye surgery  07/21/2022    Open heart surgical  profile        History reviewed. No pertinent family history.   Social History     Socioeconomic History    Marital status:    Tobacco Use    Smoking status: Never    Smokeless tobacco: Never   Vaping Use    Vaping status: Never Used   Substance and Sexual Activity    Alcohol use: Not Currently    Drug use: Never           REVIEW OF SYSTEMS:     No n/v/f/c.      EXAM:   There were no vitals taken for this visit.  GENERAL: well developed, well nourished, in no apparent distress  EXTREMITIES:   1. Integument: Normal skin temperature and turgor.  Right foot incision is well healed.  Remaining nails are elongated and thickened.  HPK noted subleft fifth metatarsal base.   2. Vascular: Pedal pulses are lightly palpable.   3. Musculoskeletal: All muscle groups are graded 5 out of 5 in the foot and ankle.  Status post second toe amputation of left foot.  Status post partial fifth ray amputation of right foot.  Prominent left fifth metatarsal base.  Compartments are soft and compressible.   4. Neurological: Normal sharp dull sensation; reflexes normal.  Decreased protective sensation noted to lower extremities.          ASSESSMENT AND PLAN:   Diagnoses and all orders for this visit:    Diabetic polyneuropathy associated with type 2 diabetes mellitus (HCC)  -     DME - External    PAD (peripheral artery disease)  -     DME - External    Foot callus  -     DME - External    History of amputation of lesser toe, left (HCC)  -     DME - External    Metatarsalgia, left foot  -     DME - External    Hammer toes of both feet  -     DME - External            Plan:    -status post right partial fifth ray amputation for treatment of osteomyelitis (DOS: 2/13/24).  -Surgical site is well-healed.  Patient has been discharged from surgical care.  No restrictions at this time.  -Sharply debrided remaining nails with nail nipper without incident.  Nails smoothed with Dremel.  -Pared HPK x 1 with #15 blade to healthy tissue without  incident.  -Check feet daily and follow-up immediately if any concerns arise.  -Can ambulate with postop shoes or diabetic shoes and inserts. Referral placed for new shoes/inserts.  -Work on bringing blood sugars to better range.  -Patient seek immediate medical attention if any acute signs of infection or other concerns arise.  Can follow-up in 2-3 months routine footcare or sooner if other concerns arise.      The patient indicates understanding of these issues and agrees to the plan.        Jayesh Richardson DPM    Dragon speech recognition software was used to prepare this note.  Errors in word recognition may occur.  Please contact me with any questions/concerns with this note.

## 2025-07-18 ENCOUNTER — TELEPHONE (OUTPATIENT)
Facility: CLINIC | Age: 75
End: 2025-07-18

## 2025-07-18 ENCOUNTER — PATIENT MESSAGE (OUTPATIENT)
Facility: CLINIC | Age: 75
End: 2025-07-18

## 2025-07-18 ENCOUNTER — OFFICE VISIT (OUTPATIENT)
Facility: CLINIC | Age: 75
End: 2025-07-18
Payer: COMMERCIAL

## 2025-07-18 VITALS
WEIGHT: 172 LBS | HEART RATE: 62 BPM | OXYGEN SATURATION: 98 % | DIASTOLIC BLOOD PRESSURE: 50 MMHG | HEIGHT: 70 IN | SYSTOLIC BLOOD PRESSURE: 148 MMHG | BODY MASS INDEX: 24.62 KG/M2

## 2025-07-18 DIAGNOSIS — E11.21 TYPE 2 DIABETES MELLITUS WITH MICROALBUMINURIC DIABETIC NEPHROPATHY (HCC): ICD-10-CM

## 2025-07-18 DIAGNOSIS — I15.2 HYPERTENSION ASSOCIATED WITH TYPE 2 DIABETES MELLITUS (HCC): ICD-10-CM

## 2025-07-18 DIAGNOSIS — E78.5 HYPERLIPIDEMIA ASSOCIATED WITH TYPE 2 DIABETES MELLITUS (HCC): ICD-10-CM

## 2025-07-18 DIAGNOSIS — E11.69 HYPERLIPIDEMIA ASSOCIATED WITH TYPE 2 DIABETES MELLITUS (HCC): ICD-10-CM

## 2025-07-18 DIAGNOSIS — E11.65 UNCONTROLLED TYPE 2 DIABETES MELLITUS WITH HYPERGLYCEMIA (HCC): Primary | ICD-10-CM

## 2025-07-18 DIAGNOSIS — E11.59 HYPERTENSION ASSOCIATED WITH TYPE 2 DIABETES MELLITUS (HCC): ICD-10-CM

## 2025-07-18 PROCEDURE — G2211 COMPLEX E/M VISIT ADD ON: HCPCS

## 2025-07-18 PROCEDURE — 3077F SYST BP >= 140 MM HG: CPT

## 2025-07-18 PROCEDURE — 1160F RVW MEDS BY RX/DR IN RCRD: CPT

## 2025-07-18 PROCEDURE — 3078F DIAST BP <80 MM HG: CPT

## 2025-07-18 PROCEDURE — 1159F MED LIST DOCD IN RCRD: CPT

## 2025-07-18 PROCEDURE — 3008F BODY MASS INDEX DOCD: CPT

## 2025-07-18 PROCEDURE — 99215 OFFICE O/P EST HI 40 MIN: CPT

## 2025-07-18 RX ORDER — INSULIN ASPART 100 [IU]/ML
INJECTION, SOLUTION INTRAVENOUS; SUBCUTANEOUS
Qty: 9 ML | Refills: 0 | Status: SHIPPED | OUTPATIENT
Start: 2025-07-18

## 2025-07-18 RX ORDER — SEMAGLUTIDE 0.68 MG/ML
0.5 INJECTION, SOLUTION SUBCUTANEOUS WEEKLY
Qty: 9 ML | Refills: 0 | Status: SHIPPED | OUTPATIENT
Start: 2025-07-18

## 2025-07-18 RX ORDER — INSULIN ASPART 100 [IU]/ML
INJECTION, SOLUTION INTRAVENOUS; SUBCUTANEOUS
Qty: 9 ML | Refills: 0 | Status: SHIPPED | OUTPATIENT
Start: 2025-07-18 | End: 2025-07-18

## 2025-07-18 RX ORDER — SEMAGLUTIDE 0.68 MG/ML
INJECTION, SOLUTION SUBCUTANEOUS
Qty: 1 EACH | Refills: 0 | COMMUNITY
Start: 2025-07-18

## 2025-07-18 NOTE — TELEPHONE ENCOUNTER
Contacted [facility] eye care regarding patients diabetic eye exam    [phone]    Awaiting report

## 2025-07-18 NOTE — TELEPHONE ENCOUNTER
Order sent through [facility]    Approved by Temi   accepted order successfully. #[id number]

## 2025-07-18 NOTE — TELEPHONE ENCOUNTER
On multiple DM meds:  Started on basal and bolus insulin.   On Glimepiride and will start Ozempic.   On metformin    Needs Dana 3+ with reader

## 2025-07-18 NOTE — PROGRESS NOTES
Name: Roberto Oviedo     Date: 7/18/2025     Attending Physician: Jhonathan Freitas MD   Last DM Visit: 2/1/2023    CHIEF COMPLAINT:     Chief Complaint   Patient presents with    Diabetes     referred by pcp/ type 2/ streaming dana 2/ brought meter / not fasting         HISTORY OF PRESENT ILLNESS:   Roberto Oviedo is a 74 year old male presenting today to re-establish for the management of Type 2 diabetes. Diagnosed in 2010 with blood test. Originally from [place] and moved here in the summer of 2021 and saw [facility] initially in Nov 2021 until Feb 2023. He went back to his PCP for the management of his diabetes. Recent significant increase in A1C prompted referral with us again.    Pt lives alone in an apartment but daughter Ricarda is nearby,  2 floors, higher which assists him with daily meals, medication management and other important tasks like grocery shopping. Pt unable to check sugars at home due to chronic shakiness, thickened fingertips and poor vision. Recently, got a sample of Dana 2 which only last until July 12. Pt admits he does better with the sensor on because it helps him avoid more eating if its alarming frequently. Without sensor, he typically runs higher. Daughter Ricarda reports that he has history or sudden glucose drops with prandial insulin but have used Tresiba before. Daughter checks his glucose, gives him injections when he was on [place] and insulin.   Currently, he is only on Metformin and Glimepiride and not checked since the sensor ran out.     HbA1C: 15.6% on 6/25/25 (Last HbA1C:  on 8.5% on 10/26/24)  Blood glucose at POC in clinic today: Not checked. Has been running high up to 300's  Late entry: Lisbet Chowdary messaged myself that after OV, Roberto blood sugar was 216 and repeat later in the day was lower at 190 and last check was 124 mg/dl.     HOME GLUCOSE READINGS:  Testing BG : None in the past 3-4 wks, when sensor stopped    Hypoglycemia present: no a long time  ago  Hypoglycemia frequency: NA   Hypoglycemia awareness:  Yes, history of hypoglycemia unawareness on dm note review    CGM: yes Dana 2  Reviewed CGM report/trends with patient today (full download in media)  Date: 6/15/25 - 7/12/25    Findings:  Average glucose: 247 mg/dl  eGMI 9.2%  TIR 15%  Hypoglycemia: no  Variability: WNL  Lowest reading 157 - 160 very briefly at night but mostly >200 throughout day and night    FAMILY HISTORY:  yes Diabetes     DIABETES HISTORY:  Diagnosed: Type 2 in 2010  Prior HbA1C were:   Initial visit in diab ctr - 11/2/2021  Patient has not had hospitalizations for blood sugar issues  denies any history of pancreatitis    PREVIOUS DM THERAPIES:  Bydureon - stopped due to improvement of A1c  Tresiba/Novolog - stopped with improvement of trends    CURRENT DM REGIMEN:  Diabetic Medications              METFORMIN 500 MG Oral Tab (Taking) TAKE 2 TABLETS BY MOUTH TWICE A DAY WITH MEALS    GLIMEPIRIDE 1 MG Oral Tab (Taking) TAKE 1 TABLET (1 MG TOTAL) BY MOUTH DAILY WITH BREAKFAST.       Metformin IR 1000 mg twice a day  Glimepiride 1 mg with breakfast  Humalog tid w/meals/scale                200-250 mg/dl take 2 units                250-300 mg/dl take 3 units                >301 mg/dl take 4 units    HISTORY OF DIABETES COMPLICATIONS/ASSOCIATED DISEASES:  Microvascular:   Neuropathy: yes  Retinopathy: no  Nephropathy: yes eGFR 61 6/2025, (+) MAC    Macrovascular:  PVD: yes 2nd toe 2021, right foot 5th digit 2024  Cardiovascular/CAD: yes Quadruple bypass in 2007, 2018. Has cardio   Stroke/CVA: no    HYPERTENSION: yes  HYPERLIPIDEMIA: yes    DIETARY ADHERENCE:  Eats: 3 x/day  Breakfast/Lunch/Dinner:  Snacks: frequently, healthier options  Drinks: I cup oatmeal stevia    PHYSICAL ACTIVITY:  no    MODIFYING FACTORS:   Medication adherence: Yes  Barriers: Shakiness, poor eyesight, thickened fingertips/harder to check sugars manually  Recent steroids, illness or infections ( past 3m): None    DM  ASSOCIATED REVIEW OF SYMPTOMS   Endocrine: Polyuria, polyphagia, polydipsia: no  Neurological: Paresthesias: no  HEENT: Blurred vision: no  Skin: no rash or wounds  Hematological: Hypoglycemia: none in the past years, remote history of hypoglycemia    REVIEW OF SYSTEMS:  CONSTITUTIONAL:  Denies weight change, (+) has very good appetite  LUNGS: denies shortness of breath   CARDIOVASCULAR: denies chest pain  GI: denies abdominal pain, nausea or diarrhea   : denies dysuria  MUSCULOSKELETAL: denies pain  SKIN: denies rash, blister infection or ulcers  LYMPHATICS: denies lower extremity edema (+) history of toe amputations, x 2    MEDICATIONS:  Medications - Current[1]     ALLERGY:  Allergies[2]     SOCIAL HISTORY:  Social Hx on file[3]     PAST MEDICAL HISTORY:  Past Medical History[4]     PAST SURGICAL HISTORY  Past Surgical History[5]     Physical exam:  /50 (BP Location: Right arm, Patient Position: Sitting, Cuff Size: adult)   Pulse 62   Ht 5' 10\" (1.778 m)   Wt 172 lb (78 kg)   SpO2 98%   BMI 24.68 kg/m²   Body mass index is 24.68 kg/m².    PHYSICAL EXAM  Vitals reviewed.  Constitutional: Normal appearance   Cardiovascular: Normal rate , rhythm   Pulmonary/Chest: Effort normal  Neurological: Alert and oriented .   Psychiatric: Normal mood and affect.   Extremities: No obvious swelling or lesions    LABS: Pertinent labs reviewed  HGBA1C:    Lab Results   Component Value Date    A1C 15.6 (H) 06/25/2025    A1C 8.5 (H) 10/26/2024    A1C 7.6 (H) 02/09/2024     (H) 06/25/2025       Lab Results   Component Value Date    CHOLEST 345 (H) 06/25/2025    CHOLEST 328 (H) 10/26/2024    TRIG 145 06/25/2025    TRIG 92 10/26/2024    HDL 53 06/25/2025    HDL 54 10/26/2024     (H) 06/25/2025     (H) 10/26/2024     Lab Results   Component Value Date    MICROALBCREA 477.0 (H) 06/25/2025    MICROALBCREA 199.1 (H) 09/30/2023      Lab Results   Component Value Date    CREATSERUM 1.24 06/25/2025     CREATSERUM 1.02 10/26/2024    EGFRCR 61 06/25/2025    EGFRCR 78 10/26/2024     Lab Results   Component Value Date    AST 16 06/25/2025    AST 20 10/26/2024    ALT 10 06/25/2025    ALT 11 10/26/2024       No results found for: \"TSH\", \"T4F\"    ASSESSMENT/PLAN:    - Reviewed with patient the pathogenesis of diabetes, clinical significance of A1c, and common complications such as microvascular, macrovascular and diabetic ketoacidosis. Patient verbalizes understanding of the importance of glycemic control and the goals of therapy.   - Discussed with patient glucose target ranges (Fasting 80 - 130 and post prandial <180).   -Discussed ABCs of DM    Uncontrolled type 2 diabetes mellitus with hyperglycemia (HCC)    Diabetes control is poor  - HbA1c: 15.6% 6/25/25 -> Reviewed what the value reflects and the goal for the patient  - Discussed basal vs bolus insulin, its action and side effects, dosing instructions  - Discussed adding GLP-1 to current medication regimen.  Discussed action, risk vs benefit, dosing, and potential side effects.  Patient denies hx of pancreatitis, gastroparesis, or personal or family hx of medullary thyroid CA or MEN 2.  - pt/daughter agreeable to start pt on Ozempic and aware that we will titrate cautiously      a) Medications  Continue:  Metformin 500 mg 2 tabs twice a day with meals  Glimepiride 1 mg once daily  Start taking Ozempic ONCE a week.   Dose: 0.25mg once weekly x 4 doses (4 weeks)   If you do not have any GI symptoms (bloating, diarrhea, nausea or vomiting) increase the dose to 0.5mg once a week.   ~ Showed demo pen, instructed patient on insulin pen use, priming, site selection for subcutaneous injections, administration, rotation and to wait 8-10 secs before pulling out pen from injection site.  ~ Instructed on pen needles - removing two caps, securing pen needles to the insulin pen tightly, and proper sharps disposal      - If it is too costly, we will try to apply for Patient  assistance program     Start Tresiba U100 10 units daily  ~ Showed demo pen, reviewed w/ daughter on insulin pen use including priming, site selection for subcutaneous injections, administration, rotation and to wait 8-10 secs before pulling out pen from injection site.    - Given sample pen    Start Novolog U100 give prior to meals prn depending on sensor  If >300 mg/dl - give 2 units        b) Discussed importance of annual eye exams   Last Dilated Eye Exam: 06/19/25   Exam shows retinopathy? No data recorded     c) Foot exam: Daily feet exam explained Last Foot Exam: 07/11/25 (by Dr Richardson, DPM)       d) BG monitoring: Recommended SMBG  3 x daily if not using CGM - do glucose log and bring glucometer on next visit.   - We will try order through [facility] with reader    e) Hypoglycemia:  Reviewed hypoglycemia signs/symptoms, treatment using the Rule of 15's (15gm of CHO every 15mins for blood glucose <80) and when to call office. Rule of 15 on AVS  - get glucose tabs prn/restart sensor    f) Lifestyle changes: Importance of low CHO diet, recommend 30 - 45 gm per meal on 135 gm per day  Reminded to continue with lifestyle modifications since they have positive impact on diabetes/blood sugars/health (portion control, physical activity, weight loss)   Exercise: Target a weight loss of 7% and increase exercises to at least 150 mins per week    g) Date of last PHQ-2 depression screen: PHQ-2 SCORE: 0  , done 10/9/2024       h) Recommended for  patient to follow up in Diabetes center to review carb goals, food label reading, and offer further support/guidance with exercise planning and weight loss.     2. Blood Pressure Management/Hypertension  - Higher today - walked from the parking lot to the office  - on BP Meds: lisinopril Tabs - 20 MG    - CPM    - Late entry: Daughter Ricarda called and confirm BP is getting better at 140/60. Messaged to continue bp monitoring at home and with persistence of higher numbers, must  reach out to us or PCP    3. Lipids Management  - Lipid test: Up to date and at goal 6/2025  - on Cholesterol Meds: rosuvastatin Tabs - 40 MG      - Cholesterol: 345, done on 6/25/2025.  HDL Cholesterol: 53, done on 6/25/2025.  TriGlycerides 145, done on 6/25/2025.  LDL Cholesterol: 264, done on 6/25/2025.         4. Nephropathy/CKD St 2 with microalbuminuria  Lab Results   Component Value Date    EGFRCR 61 06/25/2025    MICROALBCREA 477.0 (H) 06/25/2025       5. Weight Management  - at goal  - cautious with increasing Ozempic dose, considering current weight.  Wt Readings from Last 3 Encounters:   07/18/25 172 lb (78 kg)   06/25/25 168 lb (76.2 kg)   10/09/24 171 lb (77.6 kg)           Orders Placed This Encounter    OZEMPIC, 0.25 OR 0.5 MG/DOSE, 2 MG/3ML Subcutaneous Solution Pen-injector     Sig: As directed     Dispense:  1 each     Refill:  0     LOT [id number]   EXP 11/30/27     Lot Number?:   LOT [id number]     Expiration Date?:   11/30/2027     Quantity:   1    semaglutide (OZEMPIC, 0.25 OR 0.5 MG/DOSE,) 2 MG/3ML Subcutaneous Solution Pen-injector     Sig: Inject 0.5 mg into the skin once a week.     Dispense:  9 mL     Refill:  0    insulin degludec 100 UNIT/ML Subcutaneous Solution Pen-injector     Sig: Inject 10 Units into the skin nightly.     Dispense:  9 mL     Refill:  0    insulin aspart (NOVOLOG FLEXPEN) 100 Units/mL Subcutaneous Solution Pen-injector     Sig: Uses up t6 units daily as directed in divided doses. E11.65     Dispense:  9 mL     Refill:  0         Reinforced timing and adherence with medication, self-monitoring of blood glucose and routine follow up      RTC in 4 wks    Patient instructed to call sooner if they develop Blood glucose readings <70 and/or if they have readings persistently >180.     The risks and benefits of my recommendations, as well as other treatment options were discussed with the patient today. Questions were also answered to the best of my knowledge. Patient verbalizes  understanding of these issues and agrees to the plan.    7/18/2025  [facility]      Code selection for this visit was based on time spent (60 min ) on date of service in preparing to see the patient, obtaining and/or reviewing separately obtained history,evaluating patient, reviewing blood glucose trends/patterns, discussing treatment options performing a medically appropriate examination, counseling and educating the patient/family/caregiver, ordering medications or testing, referring and communicating with other healthcare providers, documenting clinical information in the EHR, independently interpreting results and communicating results to the patient/family. Note: This time does NOT include CGM interpretation time   Note to patient: The 21 Century Cures Act makes medical notes like these available to patients in the interest of transparency. However, be advised this is a medical document. It is intended as peer to peer communication. It is written in medical language and may contain abbreviations or verbiage that are unfamiliar. It may appear blunt or direct. Medical documents are intended to carry relevant information, facts as evident, and the clinical opinion of the practitioner.                     [1]   Current Outpatient Medications:     OZEMPIC, 0.25 OR 0.5 MG/DOSE, 2 MG/3ML Subcutaneous Solution Pen-injector, As directed, Disp: 1 each, Rfl: 0    semaglutide (OZEMPIC, 0.25 OR 0.5 MG/DOSE,) 2 MG/3ML Subcutaneous Solution Pen-injector, Inject 0.5 mg into the skin once a week., Disp: 9 mL, Rfl: 0    insulin degludec 100 UNIT/ML Subcutaneous Solution Pen-injector, Inject 10 Units into the skin nightly., Disp: 9 mL, Rfl: 0    insulin aspart (NOVOLOG FLEXPEN) 100 Units/mL Subcutaneous Solution Pen-injector, Uses up t6 units daily as directed in divided doses. E11.65, Disp: 9 mL, Rfl: 0    rosuvastatin (CRESTOR) 40 MG Oral Tab, Take 1 tablet (40 mg total) by mouth nightly., Disp: 90 tablet, Rfl: 0     SERTRALINE 100 MG Oral Tab, TAKE 1 TABLET BY MOUTH EVERY DAY, Disp: 90 tablet, Rfl: 0    METFORMIN 500 MG Oral Tab, TAKE 2 TABLETS BY MOUTH TWICE A DAY WITH MEALS, Disp: 360 tablet, Rfl: 0    GLIMEPIRIDE 1 MG Oral Tab, TAKE 1 TABLET (1 MG TOTAL) BY MOUTH DAILY WITH BREAKFAST. *DUE FOR LABS AND ANNUAL PHYSICAL, PLEASE CALL THE OFFICE*, Disp: 90 tablet, Rfl: 2    Melatonin 5 MG Oral Tab, Take 1 tablet (5 mg total) by mouth at bedtime., Disp: , Rfl:     LISINOPRIL 20 MG Oral Tab, TAKE 1 TABLET BY MOUTH EVERY DAY, Disp: 90 tablet, Rfl: 1    Continuous Blood Gluc Sensor (FREESTYLE CLAUDE 2 SENSOR) Does not apply Misc, 1 each every 14 (fourteen) days. One sensor every 14 days., Disp: 2 each, Rfl: 3    CLOPIDOGREL 75 MG Oral Tab, TAKE 1 TABLET BY MOUTH EVERY DAY (Patient not taking: Reported on 7/18/2025), Disp: 90 tablet, Rfl: 0  [2] No Known Allergies  [3]   Social History  Socioeconomic History    Marital status:    Tobacco Use    Smoking status: Never    Smokeless tobacco: Never   Vaping Use    Vaping status: Never Used   Substance and Sexual Activity    Alcohol use: Not Currently    Drug use: Never   [4]   Past Medical History:   Diabetes (HCC)    Essential hypertension    Heart attack (HCC)   [5]   Past Surgical History:  Procedure Laterality Date    Eye surgery  07/21/2022    Open heart surgical profile

## 2025-07-18 NOTE — TELEPHONE ENCOUNTER
Requesting alternative for Novolog and Degludec due to not being covered by plan    Please advise

## 2025-07-18 NOTE — TELEPHONE ENCOUNTER
Received eye exam results  Patient seen 6/25/25 - Fax says [facility]   NO DR detected    Abstracted and given to provider to review - signed and sent to scan

## 2025-07-19 NOTE — TELEPHONE ENCOUNTER
Noted update from daughter Ricarda and notified that they do not have to  the medication yet. [employer] message sent. Patient was given a sample of long-acting insulin, hence not needed to  prescription from pharmacy.

## 2025-07-19 NOTE — TELEPHONE ENCOUNTER
Noted. Not needed new prescription right now. Pt has Tresiba from a sample and Novolog is not needed. Novolog ordered per sliding scale of >300. Trends are getting better, hence not needed for now. May change if pt ask for refill.

## 2025-07-21 NOTE — TELEPHONE ENCOUNTER
Order has been delivered/shipped out as of today.         [employer] 7/21 ? 1:01PM  added a tracking number: UPS - [id number]  updated delivered date to 07/21/2025  Completed: Order for Dana 3 Plus Sensor, change every 15 days + 1 other item has been completed successfully    [employer] 7/18 ? 4:40PM  updated expected delivery date to 07/18/25  Ready for Delivery: Order for Dana 3 Plus Sensor, change every 15 days + 1 other item is ready for delivery    [employer] 7/18 ? 3:50PM  Scheduling Delivery: Order for Dana 3 Plus Sensor, change every 15 days + 1 other item is in the final stages of being processed    [employer] 7/18 ? 2:56PM  This order is pending further review.    [employer] 7/18 ? 2:55PM  The order has been reviewed and is ready to proceed. The estimated patient responsibility is $0.00, which includes 1  and 9 sensors. We will be shipping this order to the patients' home address on file.

## 2025-07-24 RX ORDER — INSULIN DEGLUDEC 100 U/ML
INJECTION, SOLUTION SUBCUTANEOUS
Qty: 9 ML | Refills: 0 | Status: SHIPPED | OUTPATIENT
Start: 2025-07-24

## 2025-07-24 RX ORDER — INSULIN LISPRO 100 [IU]/ML
INJECTION, SOLUTION INTRAVENOUS; SUBCUTANEOUS
Qty: 9 ML | Refills: 0 | Status: SHIPPED | OUTPATIENT
Start: 2025-07-24

## 2025-07-28 NOTE — TELEPHONE ENCOUNTER
Last time medication was refilled 10/27/21  Quantity and # of refills 90/1  Last OV 6/28/22 Telemedicine  Next OV No apt scheduled    Pt is over due x CPE. Patient called to follow up on his refill request as he received a denied response unsure why it was denied. Please follow up with patient

## (undated) DIAGNOSIS — E11.65 TYPE 2 DIABETES MELLITUS WITH HYPERGLYCEMIA, WITH LONG-TERM CURRENT USE OF INSULIN (HCC): ICD-10-CM

## (undated) DIAGNOSIS — Z79.4 TYPE 2 DIABETES MELLITUS WITH HYPERGLYCEMIA, WITH LONG-TERM CURRENT USE OF INSULIN (HCC): ICD-10-CM

## (undated) DIAGNOSIS — I10 PRIMARY HYPERTENSION: ICD-10-CM

## (undated) DEVICE — SCD SLEEVE KNEE HI BLEND

## (undated) DEVICE — LOWER EXTREMITY CDS-LF: Brand: MEDLINE INDUSTRIES, INC.

## (undated) DEVICE — VIOLET BRAIDED (POLYGLACTIN 910), SYNTHETIC ABSORBABLE SUTURE: Brand: COATED VICRYL

## (undated) DEVICE — SUTURE ETHLN 3-0 18IN NABSRB BLK 24MM PS-1

## (undated) DEVICE — CUFF TRNQT 3IN X 18IN RED CYL SGL BLDR 2 PRT

## (undated) DEVICE — SOL  .9 1000ML BTL

## (undated) DEVICE — 3M™ STERI-DRAPE™ U-DRAPE 1015: Brand: STERI-DRAPE™

## (undated) DEVICE — SLEEVE COMPR M KNEE LEN SGL USE KENDALL SCD

## (undated) DEVICE — NON-ADHERENT STRIPS,OIL EMULSION: Brand: CURITY

## (undated) DEVICE — BANDAGE COHESIVE W4INXL5YD TAN E POR SLF ADH

## (undated) DEVICE — SHOE POSTOP L M SZ 13-14 BLK NYL SEMIRIGID

## (undated) DEVICE — SOLUTION IRRIG 1000ML 0.9% NACL USP BTL

## (undated) DEVICE — MICRO SAGITTAL BLADE (9.5 X 0.4 X 25.5MM)

## (undated) DEVICE — SOLUTION PREP 4OZ 10% POVIDONE IOD SCR TOP

## (undated) DEVICE — Device

## (undated) DEVICE — SUTURE ETHLN 2-0 18IN NABSRB BLK 26MM FS 3/8

## (undated) DEVICE — 3M™ IOBAN™ 2 ANTIMICROBIAL INCISE DRAPE 6650EZ: Brand: IOBAN™ 2

## (undated) DEVICE — BANDAGE GZ W4.5INXL4.1YD 6 PLY WHT COT OPN

## (undated) DEVICE — SPONGE GZ W6XL6.75IN 6 PLY COT SUP FLUF X

## (undated) DEVICE — SUTURE ETHILON 3-0 PS-1

## (undated) DEVICE — SYSTEM IRRISEPT WND IRR 0.05%

## (undated) DEVICE — CHLORAPREP 26ML APPLICATOR

## (undated) DEVICE — PACK PBDS LOWER EXTREMITY

## (undated) DEVICE — STANDARD HYPODERMIC NEEDLE,POLYPROPYLENE HUB: Brand: MONOJECT

## (undated) DEVICE — REM POLYHESIVE ADULT PATIENT RETURN ELECTRODE: Brand: VALLEYLAB

## (undated) DEVICE — STRL PENROSE DRAIN 18" X 1/2": Brand: CARDINAL HEALTH

## (undated) DEVICE — BANDAGE ROLL,100% COTTON, 6 PLY, LARGE: Brand: KERLIX

## (undated) DEVICE — SUPER SPONGES,MEDIUM: Brand: KERLIX

## (undated) DEVICE — NEEDLE HYPO 22X1-1/2

## (undated) DEVICE — HANDPIECE IRRIG BTTRY OPERATED TYP W/ SUCT HI

## (undated) DEVICE — SOLUTION IV 1000ML 0.9% NACL PRESERVATIVE

## (undated) DEVICE — GLOVE SUR 7.5 PROTEXIS PI PIP CRM PWD F

## (undated) NOTE — LETTER
Ginny Riley 89  [address] [facility]  Authorization for Surgical Operation and Procedure     Date:___________                                                                                                         Time:__________ fever and allergic reactions, hemolytic reactions, transmission of diseases such as Hepatitis, AIDS and Cytomegalovirus (CMV) and fluid overload. In the event that I wish to have an autologous transfusion of my own blood, or a directed donor transfusion. applicable recovery period ends for purposes of reinstating the DNAR order.   10. Patients having a sterilization procedure: I understand that if the procedure is successful the results will be permanent and it will therefore be impossible for me to insemin to give me medicine and do additional procedures as necessary. Some examples are: Starting or using an “IV” to give me medicine, fluids or blood during my procedure, and having a breathing tube placed to help me breathe when I’m asleep (intubation).  In the that rare but potential complications include headache, bleeding, infection, seizure, irregular heart rhythms, and nerve injury.     I can change my mind about having anesthesia services at any time before I get the medicine.    ____________________________

## (undated) NOTE — LETTER
Ginny Riley 89  [address] [facility]  Authorization for Surgical Operation and Procedure     Date:___________                                                                                                         Time:__________ that can occur: fever and allergic reactions, hemolytic reactions, transmission of diseases such as Hepatitis, AIDS and Cytomegalovirus (CMV) and fluid overload.   In the event that I wish to have an autologous transfusion of my own blood, or a directed don when the applicable recovery period ends for purposes of reinstating the DNAR order.   10. Patients having a sterilization procedure: I understand that if the procedure is successful the results will be permanent and it will therefore be impossible for me t (anesthesia doctor) to give me medicine and do additional procedures as necessary.  Some examples are: Starting or using an “IV” to give me medicine, fluids or blood during my procedure, and having a breathing tube placed to help me breathe when I’m asleep blocks”): I understand that rare but potential complications include headache, bleeding, infection, seizure, irregular heart rhythms, and nerve injury.     I can change my mind about having anesthesia services at any time before I get the medicine.    ____

## (undated) NOTE — LETTER
Ginny Riley 89  [address] [facility]  Authorization for Surgical Operation and Procedure     Date:___________                                                                                                         Time:__________ can occur: fever and allergic reactions, hemolytic reactions, transmission of diseases such as Hepatitis, AIDS and Cytomegalovirus (CMV) and fluid overload.   In the event that I wish to have an autologous transfusion of my own blood, or a directed donor tr the applicable recovery period ends for purposes of reinstating the DNAR order.   10. Patients having a sterilization procedure: I understand that if the procedure is successful the results will be permanent and it will therefore be impossible for me to ins doctor) to give me medicine and do additional procedures as necessary.  Some examples are: Starting or using an “IV” to give me medicine, fluids or blood during my procedure, and having a breathing tube placed to help me breathe when I’m asleep (intubation) understand that rare but potential complications include headache, bleeding, infection, seizure, irregular heart rhythms, and nerve injury.     I can change my mind about having anesthesia services at any time before I get the medicine.    _________________

## (undated) NOTE — Clinical Note
TCM call completed. The daughter expressed interest in scheduling with the TCC. An outreach was sent to [facility]. Thank you.

## (undated) NOTE — LETTER
[address]  [address]  Authorization for Surgical Operation and Procedure     Date:__2/10/2024                                                                                                         Time:__14:13_  I hereby authorize Surgeon(s):  Jayesh Richardson DPM, my physician and his/her assistants (if applicable), which may include medical students, residents, and/or fellows, to perform the following surgical operation/ procedure and administer such anesthesia as may be determined necessary by my physician:  Operation/Procedure name (s) Procedure(s):  PARTIAL 5TH. RAY  AMPUTATION - RIGHT on Roberto Oviedo   2.   I recognize that during the surgical operation/procedure, unforeseen conditions may necessitate additional or different procedures than those listed above.  I, therefore, further authorize and request that the above-named surgeon, assistants, or designees perform such procedures as are, in their judgment, necessary and desirable.    3.   My surgeon/physician has discussed prior to my surgery the potential benefits, risks and side effects of this procedure; the likelihood of achieving goals; and potential problems that might occur during recuperation.  They also discussed reasonable alternatives to the procedure, including risks, benefits, and side effects related to the alternatives and risks related to not receiving this procedure.  I have had all my questions answered and I acknowledge that no guarantee has been made as to the result that may be obtained.    4.   Should the need arise during my operation/procedure, which includes change of level of care prior to discharge, I also consent to the administration of blood and/or blood products.  Further, I understand that despite careful testing and screening of blood or blood products by collecting agencies, I may still be subject to ill effects as a result of receiving a blood transfusion and/or blood products.   The following are some, but not all, of the potential risks that can occur: fever and allergic reactions, hemolytic reactions, transmission of diseases such as Hepatitis, AIDS and Cytomegalovirus (CMV) and fluid overload.  In the event that I wish to have an autologous transfusion of my own blood, or a directed donor transfusion, I will discuss this with my physician.  Check only if Refusing Blood or Blood Products  I understand refusal of blood or blood products as deemed necessary by my physician may have serious consequences to my condition to include possible death. I hereby assume responsibility for my refusal and release the hospital, its personnel, and my physicians from any responsibility for the consequences of my refusal.          o  Refuse      5.   I authorize the use of any specimen, organs, tissues, body parts or foreign objects that may be removed from my body during the operation/procedure for diagnosis, research or teaching purposes and their subsequent disposal by hospital authorities.  I also authorize the release of specimen test results and/or written reports to my treating physician on the hospital medical staff or other referring or consulting physicians involved in my care, at the discretion of the Pathologist or my treating physician.    6.   I consent to the photographing or videotaping of the operations or procedures to be performed, including appropriate portions of my body for medical, scientific, or educational purposes, provided my identity is not revealed by the pictures or by descriptive texts accompanying them.  If the procedure has been photographed/videotaped, the surgeon will obtain the original picture, image, videotape or CD.  The hospital will not be responsible for storage, release or maintenance of the picture, image, tape or CD.    7.   I consent to the presence of a  or observers in the operating room as deemed necessary by my physician or their  designees.    8.   I recognize that in the event my procedure results in extended X-Ray/fluoroscopy time, I may develop a skin reaction.    9. If I have a Do Not Attempt Resuscitation (DNAR) order in place, that status will be suspended while in the operating room, procedural suite, and during the recovery period unless otherwise explicitly stated by me (or a person authorized to consent on my behalf). The surgeon or my attending physician will determine when the applicable recovery period ends for purposes of reinstating the DNAR order.  10. Patients having a sterilization procedure: I understand that if the procedure is successful the results will be permanent and it will therefore be impossible for me to inseminate, conceive, or bear children.  I also understand that the procedure is intended to result in sterility, although the result has not been guaranteed.   11. I acknowledge that my physician has explained sedation/analgesia administration to me including the risk and benefits I consent to the administration of sedation/analgesia as may be necessary or desirable in the judgment of my physician.    I CERTIFY THAT I HAVE READ AND FULLY UNDERSTAND THE ABOVE CONSENT TO OPERATION and/or OTHER PROCEDURE.    _________________________________________  __________________________________  Signature of Patient     Signature of Responsible Person         ___________________________________         Printed Name of Responsible Person           _________________________________                 Relationship to Patient  _________________________________________  ______________________________  Signature of Witness          Date  Time      Patient Name: Roberto JUAN CARLOS Oviedo     : 1950                 Printed: February 10, 2024     Medical Record #: [id number]                     Page 1 of [address] [place] [address] [place]  [address]    Consent for Anesthesia    Roberto KENNY  Ivelisse agree to be cared for by an anesthesiologist, who is specially trained to monitor me and give me medicine to put me to sleep or keep me comfortable during my procedure    I understand that my anesthesiologist is not an employee or agent of [facility] [employer]. He or she works for [employer].    As the patient asking for anesthesia services, I agree to:  Allow the anesthesiologist (anesthesia doctor) to give me medicine and do additional procedures as necessary. Some examples are: Starting or using an “IV” to give me medicine, fluids or blood during my procedure, and having a breathing tube placed to help me breathe when I’m asleep (intubation). In the event that my heart stops working properly, I understand that my anesthesiologist will make every effort to sustain my life, unless otherwise directed by [facility] Do Not Resuscitate documents.  Tell my anesthesia doctor before my procedure:  If I am pregnant.  The last time that I ate or drank.  All of the medicines I take (including prescriptions, herbal supplements, and pills I can buy without a prescription (including street drugs/illegal medications). Failure to inform my anesthesiologist about these medicines may increase my risk of anesthetic complications.  If I am allergic to anything or have had a reaction to anesthesia before.  I understand how the anesthesia medicine will help me (benefits).  I understand that with any type of anesthesia medicine there are risks:  The most common risks are: nausea, vomiting, sore throat, muscle soreness, damage to my eyes, mouth, or teeth (from breathing tube placement).  Rare risks include: remembering what happened during my procedure, allergic reactions to medications, injury to my airway, heart, lungs, vision, nerves, or muscles and in extremely rare instances death.  My doctor has explained to me other choices available to me for my care (alternatives).  Pregnant  Patients (“epidural”):  I understand that the risks of having an epidural (medicine given into my back to help control pain during labor), include itching, low blood pressure, difficulty urinating, headache or slowing of the baby’s heart. Very rare risks include infection, bleeding, seizure, irregular heart rhythms and nerve injury.  Regional Anesthesia (“spinal”, “epidural”, & “nerve blocks”):  I understand that rare but potential complications include headache, bleeding, infection, seizure, irregular heart rhythms, and nerve injury.    I can change my mind about having anesthesia services at any time before I get the medicine.    _____________________________________________________________________________  Patient (or Representative) Signature/Relationship to Patient  Date   Time    _____________________________________________________________________________   Name (if used)    Language/Organization   Time    _____________________________________________________________________________  Anesthesiologist Signature     Date   Time  I have discussed the procedure and information above with the patient (or patient’s representative) and answered their questions. The patient or their representative has agreed to have anesthesia services.    _____________________________________________________________________________  Witness        Date   Time  I have verified that the signature is that of the patient or patient’s representative, and that it was signed before the procedure  Patient Name: Roberto Oviedo     : 1950                 Printed: February 10, 2024     Medical Record #: [id number]                     Page 2 of 2

## (undated) NOTE — LETTER
Ginny Riley [address]  [address] [facility] [address] [facility]  Authorization for Surgical Operation and Procedure     Date:___________                                                                                                         Time:__________ allergic reactions, hemolytic reactions, transmission of diseases such as Hepatitis, AIDS and Cytomegalovirus (CMV) and fluid overload. In the event that I wish to have an autologous transfusion of my own blood, or a directed donor transfusion.   I will di period ends for purposes of reinstating the DNAR order.   10. Patients having a sterilization procedure: I understand that if the procedure is successful the results will be permanent and it will therefore be impossible for me to inseminate, conceive, or be and do additional procedures as necessary. Some examples are: Starting or using an “IV” to give me medicine, fluids or blood during my procedure, and having a breathing tube placed to help me breathe when I’m asleep (intubation).  In the event that my heart potential complications include headache, bleeding, infection, seizure, irregular heart rhythms, and nerve injury.     I can change my mind about having anesthesia services at any time before I get the medicine.    __________________________________________

## (undated) NOTE — Clinical Note
LETY Romo completed TCM. Patient has upcoming TCM/HFU appointment scheduled on 02/23/24. Thank you.

## (undated) NOTE — LETTER
Ginny Riley [address]  [address] [facility] [address], [address] [facility]  Authorization for Surgical Operation and Procedure     Date:___________                                                                                                         Time:__________ following are some, but not all, of the potential risks that can occur: fever and allergic reactions, hemolytic reactions, transmission of diseases such as Hepatitis, AIDS and Cytomegalovirus (CMV) and fluid overload.   In the event that I wish to have an a The surgeon or my attending physician will determine when the applicable recovery period ends for purposes of reinstating the DNAR order.   10. Patients having a sterilization procedure: I understand that if the procedure is successful the results will be p I agree to:  a. Allow the anesthesiologist (anesthesia doctor) to give me medicine and do additional procedures as necessary.  Some examples are: Starting or using an “IV” to give me medicine, fluids or blood during my procedure, and having a breathing tube Anesthesia (“spinal”, “epidural”, & “nerve blocks”): I understand that rare but potential complications include headache, bleeding, infection, seizure, irregular heart rhythms, and nerve injury.     I can change my mind about having anesthesia services at

## (undated) NOTE — LETTER
[facility] [address], [address]  Consent for Procedure/Sedation    Date:10/4/21    Time:1400      1.  I authorize the performance upon Ramón Phan the following: Peripheral angiography, atherectomy, percutaneous translum Relationship to  to consent for patient: ___________________________   patient: ___________________    Witness: ______________________________________  Date: _____________________    Patient Name: Gerson NORIEGA: 1950